# Patient Record
Sex: FEMALE | Race: WHITE | Employment: OTHER | URBAN - METROPOLITAN AREA
[De-identification: names, ages, dates, MRNs, and addresses within clinical notes are randomized per-mention and may not be internally consistent; named-entity substitution may affect disease eponyms.]

---

## 2019-02-22 ENCOUNTER — APPOINTMENT (OUTPATIENT)
Dept: CT IMAGING | Age: 74
End: 2019-02-22
Payer: COMMERCIAL

## 2019-02-22 ENCOUNTER — APPOINTMENT (OUTPATIENT)
Dept: GENERAL RADIOLOGY | Age: 74
End: 2019-02-22
Payer: COMMERCIAL

## 2019-02-22 ENCOUNTER — HOSPITAL ENCOUNTER (EMERGENCY)
Age: 74
Discharge: HOME | End: 2019-02-22
Attending: EMERGENCY MEDICINE
Payer: COMMERCIAL

## 2019-02-22 VITALS
HEIGHT: 60 IN | RESPIRATION RATE: 18 BRPM | TEMPERATURE: 98.5 F | DIASTOLIC BLOOD PRESSURE: 107 MMHG | OXYGEN SATURATION: 95 % | SYSTOLIC BLOOD PRESSURE: 149 MMHG | WEIGHT: 140 LBS | HEART RATE: 73 BPM | BODY MASS INDEX: 27.48 KG/M2

## 2019-02-22 DIAGNOSIS — N30.01 ACUTE CYSTITIS WITH HEMATURIA: Primary | ICD-10-CM

## 2019-02-22 DIAGNOSIS — E86.0 DEHYDRATION: ICD-10-CM

## 2019-02-22 LAB
ALBUMIN SERPL-MCNC: 3.5 G/DL (ref 3.5–5.2)
ALP BLD-CCNC: 30 U/L (ref 35–104)
ALT SERPL-CCNC: 12 U/L (ref 0–32)
ANION GAP SERPL CALCULATED.3IONS-SCNC: 12 MMOL/L (ref 7–16)
AST SERPL-CCNC: 19 U/L (ref 0–31)
BACTERIA: ABNORMAL /HPF
BASOPHILS ABSOLUTE: 0.03 E9/L (ref 0–0.2)
BASOPHILS RELATIVE PERCENT: 0.4 % (ref 0–2)
BILIRUB SERPL-MCNC: <0.2 MG/DL (ref 0–1.2)
BILIRUBIN URINE: NEGATIVE
BLOOD, URINE: ABNORMAL
BUN BLDV-MCNC: 21 MG/DL (ref 8–23)
CALCIUM SERPL-MCNC: 8.7 MG/DL (ref 8.6–10.2)
CHLORIDE BLD-SCNC: 104 MMOL/L (ref 98–107)
CLARITY: ABNORMAL
CO2: 24 MMOL/L (ref 22–29)
COLOR: YELLOW
CREAT SERPL-MCNC: 0.8 MG/DL (ref 0.5–1)
EOSINOPHILS ABSOLUTE: 0.04 E9/L (ref 0.05–0.5)
EOSINOPHILS RELATIVE PERCENT: 0.5 % (ref 0–6)
EPITHELIAL CELLS, UA: ABNORMAL /HPF
GFR AFRICAN AMERICAN: >60
GFR NON-AFRICAN AMERICAN: >60 ML/MIN/1.73
GLUCOSE BLD-MCNC: 117 MG/DL (ref 74–99)
GLUCOSE URINE: NEGATIVE MG/DL
HCT VFR BLD CALC: 39.5 % (ref 34–48)
HEMOGLOBIN: 12.9 G/DL (ref 11.5–15.5)
IMMATURE GRANULOCYTES #: 0.04 E9/L
IMMATURE GRANULOCYTES %: 0.5 % (ref 0–5)
KETONES, URINE: 15 MG/DL
LACTIC ACID: 2.7 MMOL/L (ref 0.5–2.2)
LEUKOCYTE ESTERASE, URINE: ABNORMAL
LYMPHOCYTES ABSOLUTE: 3.1 E9/L (ref 1.5–4)
LYMPHOCYTES RELATIVE PERCENT: 39.5 % (ref 20–42)
MCH RBC QN AUTO: 33.4 PG (ref 26–35)
MCHC RBC AUTO-ENTMCNC: 32.7 % (ref 32–34.5)
MCV RBC AUTO: 102.3 FL (ref 80–99.9)
MONOCYTES ABSOLUTE: 1.07 E9/L (ref 0.1–0.95)
MONOCYTES RELATIVE PERCENT: 13.6 % (ref 2–12)
NEUTROPHILS ABSOLUTE: 3.57 E9/L (ref 1.8–7.3)
NEUTROPHILS RELATIVE PERCENT: 45.5 % (ref 43–80)
NITRITE, URINE: NEGATIVE
PDW BLD-RTO: 14.3 FL (ref 11.5–15)
PH UA: 6 (ref 5–9)
PLATELET # BLD: 131 E9/L (ref 130–450)
PMV BLD AUTO: 11.1 FL (ref 7–12)
POTASSIUM REFLEX MAGNESIUM: 4 MMOL/L (ref 3.5–5)
PROTEIN UA: NEGATIVE MG/DL
RBC # BLD: 3.86 E12/L (ref 3.5–5.5)
RBC UA: ABNORMAL /HPF (ref 0–2)
SODIUM BLD-SCNC: 140 MMOL/L (ref 132–146)
SPECIFIC GRAVITY UA: >=1.03 (ref 1–1.03)
TOTAL PROTEIN: 5.9 G/DL (ref 6.4–8.3)
TROPONIN: <0.01 NG/ML (ref 0–0.03)
UROBILINOGEN, URINE: 0.2 E.U./DL
WBC # BLD: 7.9 E9/L (ref 4.5–11.5)
WBC UA: >20 /HPF (ref 0–5)

## 2019-02-22 PROCEDURE — 2580000003 HC RX 258: Performed by: EMERGENCY MEDICINE

## 2019-02-22 PROCEDURE — 80053 COMPREHEN METABOLIC PANEL: CPT

## 2019-02-22 PROCEDURE — 83605 ASSAY OF LACTIC ACID: CPT

## 2019-02-22 PROCEDURE — 6370000000 HC RX 637 (ALT 250 FOR IP): Performed by: EMERGENCY MEDICINE

## 2019-02-22 PROCEDURE — 71045 X-RAY EXAM CHEST 1 VIEW: CPT

## 2019-02-22 PROCEDURE — 87088 URINE BACTERIA CULTURE: CPT

## 2019-02-22 PROCEDURE — 81001 URINALYSIS AUTO W/SCOPE: CPT

## 2019-02-22 PROCEDURE — 85025 COMPLETE CBC W/AUTO DIFF WBC: CPT

## 2019-02-22 PROCEDURE — 99284 EMERGENCY DEPT VISIT MOD MDM: CPT

## 2019-02-22 PROCEDURE — 96361 HYDRATE IV INFUSION ADD-ON: CPT

## 2019-02-22 PROCEDURE — 84484 ASSAY OF TROPONIN QUANT: CPT

## 2019-02-22 PROCEDURE — 96360 HYDRATION IV INFUSION INIT: CPT

## 2019-02-22 PROCEDURE — 70450 CT HEAD/BRAIN W/O DYE: CPT

## 2019-02-22 RX ORDER — CEFDINIR 300 MG/1
300 CAPSULE ORAL ONCE
Status: COMPLETED | OUTPATIENT
Start: 2019-02-22 | End: 2019-02-22

## 2019-02-22 RX ORDER — CEFDINIR 300 MG/1
300 CAPSULE ORAL 2 TIMES DAILY
Qty: 20 CAPSULE | Refills: 0 | Status: SHIPPED | OUTPATIENT
Start: 2019-02-22 | End: 2019-03-04

## 2019-02-22 RX ORDER — 0.9 % SODIUM CHLORIDE 0.9 %
1000 INTRAVENOUS SOLUTION INTRAVENOUS ONCE
Status: COMPLETED | OUTPATIENT
Start: 2019-02-22 | End: 2019-02-22

## 2019-02-22 RX ADMIN — CEFDINIR 300 MG: 300 CAPSULE ORAL at 22:11

## 2019-02-22 RX ADMIN — SODIUM CHLORIDE 1000 ML: 9 INJECTION, SOLUTION INTRAVENOUS at 19:44

## 2019-02-22 ASSESSMENT — ENCOUNTER SYMPTOMS
EYE PAIN: 0
EYE DISCHARGE: 0
VOMITING: 0
SINUS PRESSURE: 0
DIARRHEA: 0
COUGH: 0
BACK PAIN: 0
EYE REDNESS: 0
WHEEZING: 0
NAUSEA: 0
ABDOMINAL DISTENTION: 0
SORE THROAT: 0
SHORTNESS OF BREATH: 0

## 2019-02-22 ASSESSMENT — PAIN DESCRIPTION - PAIN TYPE: TYPE: CHRONIC PAIN

## 2019-02-22 ASSESSMENT — PAIN DESCRIPTION - LOCATION: LOCATION: BACK

## 2019-02-22 ASSESSMENT — PAIN DESCRIPTION - ORIENTATION: ORIENTATION: LOWER

## 2019-02-22 ASSESSMENT — PAIN SCALES - GENERAL: PAINLEVEL_OUTOF10: 10

## 2019-02-25 LAB — URINE CULTURE, ROUTINE: NORMAL

## 2019-03-02 LAB
EKG ATRIAL RATE: 68 BPM
EKG P AXIS: 60 DEGREES
EKG P-R INTERVAL: 128 MS
EKG Q-T INTERVAL: 394 MS
EKG QRS DURATION: 74 MS
EKG QTC CALCULATION (BAZETT): 418 MS
EKG R AXIS: 48 DEGREES
EKG T AXIS: 29 DEGREES
EKG VENTRICULAR RATE: 68 BPM

## 2020-07-24 ENCOUNTER — HOSPITAL ENCOUNTER (OUTPATIENT)
Dept: MAMMOGRAPHY | Age: 75
Discharge: HOME | End: 2020-07-26
Payer: COMMERCIAL

## 2020-07-24 PROCEDURE — 77063 BREAST TOMOSYNTHESIS BI: CPT

## 2020-07-27 ENCOUNTER — TELEPHONE (OUTPATIENT)
Dept: ONCOLOGY | Age: 75
End: 2020-07-27

## 2020-07-30 ENCOUNTER — HOSPITAL ENCOUNTER (OUTPATIENT)
Dept: GENERAL RADIOLOGY | Age: 75
Discharge: HOME | End: 2020-08-01
Payer: COMMERCIAL

## 2020-07-30 PROCEDURE — G0279 TOMOSYNTHESIS, MAMMO: HCPCS

## 2020-07-30 PROCEDURE — 76642 ULTRASOUND BREAST LIMITED: CPT

## 2020-10-08 ENCOUNTER — APPOINTMENT (OUTPATIENT)
Dept: GENERAL RADIOLOGY | Age: 75
End: 2020-10-08
Payer: COMMERCIAL

## 2020-10-08 ENCOUNTER — HOSPITAL ENCOUNTER (OUTPATIENT)
Age: 75
Setting detail: OBSERVATION
Discharge: HOME | End: 2020-10-09
Attending: EMERGENCY MEDICINE | Admitting: INTERNAL MEDICINE
Payer: COMMERCIAL

## 2020-10-08 PROBLEM — R55 NEAR SYNCOPE: Status: ACTIVE | Noted: 2020-10-08

## 2020-10-08 LAB
ALBUMIN SERPL-MCNC: 3.7 G/DL (ref 3.5–5.2)
ALP BLD-CCNC: 20 U/L (ref 35–104)
ALT SERPL-CCNC: <5 U/L (ref 0–32)
ANION GAP SERPL CALCULATED.3IONS-SCNC: 8 MMOL/L (ref 7–16)
AST SERPL-CCNC: 17 U/L (ref 0–31)
BACTERIA: ABNORMAL /HPF
BASOPHILS ABSOLUTE: 0.03 E9/L (ref 0–0.2)
BASOPHILS RELATIVE PERCENT: 0.4 % (ref 0–2)
BILIRUB SERPL-MCNC: 0.4 MG/DL (ref 0–1.2)
BILIRUBIN URINE: NEGATIVE
BLOOD, URINE: NEGATIVE
BUN BLDV-MCNC: 23 MG/DL (ref 8–23)
CALCIUM SERPL-MCNC: 9.3 MG/DL (ref 8.6–10.2)
CHLORIDE BLD-SCNC: 110 MMOL/L (ref 98–107)
CLARITY: CLEAR
CO2: 24 MMOL/L (ref 22–29)
COLOR: YELLOW
CREAT SERPL-MCNC: 0.7 MG/DL (ref 0.5–1)
EKG ATRIAL RATE: 69 BPM
EKG P AXIS: 35 DEGREES
EKG P-R INTERVAL: 122 MS
EKG Q-T INTERVAL: 398 MS
EKG QRS DURATION: 66 MS
EKG QTC CALCULATION (BAZETT): 426 MS
EKG R AXIS: 54 DEGREES
EKG T AXIS: 15 DEGREES
EKG VENTRICULAR RATE: 69 BPM
EOSINOPHILS ABSOLUTE: 0.12 E9/L (ref 0.05–0.5)
EOSINOPHILS RELATIVE PERCENT: 1.6 % (ref 0–6)
EPITHELIAL CELLS, UA: ABNORMAL /HPF
GFR AFRICAN AMERICAN: >60
GFR NON-AFRICAN AMERICAN: >60 ML/MIN/1.73
GLUCOSE BLD-MCNC: 82 MG/DL (ref 74–99)
GLUCOSE URINE: NEGATIVE MG/DL
HCT VFR BLD CALC: 37.7 % (ref 34–48)
HEMOGLOBIN: 12.3 G/DL (ref 11.5–15.5)
IMMATURE GRANULOCYTES #: 0.02 E9/L
IMMATURE GRANULOCYTES %: 0.3 % (ref 0–5)
KETONES, URINE: ABNORMAL MG/DL
LACTIC ACID: 1.8 MMOL/L (ref 0.5–2.2)
LEUKOCYTE ESTERASE, URINE: ABNORMAL
LYMPHOCYTES ABSOLUTE: 3.76 E9/L (ref 1.5–4)
LYMPHOCYTES RELATIVE PERCENT: 50.3 % (ref 20–42)
MCH RBC QN AUTO: 34.4 PG (ref 26–35)
MCHC RBC AUTO-ENTMCNC: 32.6 % (ref 32–34.5)
MCV RBC AUTO: 105.3 FL (ref 80–99.9)
MONOCYTES ABSOLUTE: 0.65 E9/L (ref 0.1–0.95)
MONOCYTES RELATIVE PERCENT: 8.7 % (ref 2–12)
NEUTROPHILS ABSOLUTE: 2.9 E9/L (ref 1.8–7.3)
NEUTROPHILS RELATIVE PERCENT: 38.7 % (ref 43–80)
NITRITE, URINE: NEGATIVE
PDW BLD-RTO: 13.5 FL (ref 11.5–15)
PH UA: 7 (ref 5–9)
PLATELET # BLD: 136 E9/L (ref 130–450)
PMV BLD AUTO: 12.1 FL (ref 7–12)
POTASSIUM REFLEX MAGNESIUM: 4.1 MMOL/L (ref 3.5–5)
PROTEIN UA: NEGATIVE MG/DL
RBC # BLD: 3.58 E12/L (ref 3.5–5.5)
RBC UA: ABNORMAL /HPF (ref 0–2)
SARS-COV-2, NAAT: NOT DETECTED
SODIUM BLD-SCNC: 142 MMOL/L (ref 132–146)
SPECIFIC GRAVITY UA: 1.01 (ref 1–1.03)
TOTAL PROTEIN: 5.7 G/DL (ref 6.4–8.3)
TROPONIN: <0.01 NG/ML (ref 0–0.03)
TROPONIN: <0.01 NG/ML (ref 0–0.03)
UROBILINOGEN, URINE: 0.2 E.U./DL
WBC # BLD: 7.5 E9/L (ref 4.5–11.5)
WBC UA: ABNORMAL /HPF (ref 0–5)

## 2020-10-08 PROCEDURE — U0002 COVID-19 LAB TEST NON-CDC: HCPCS

## 2020-10-08 PROCEDURE — 71045 X-RAY EXAM CHEST 1 VIEW: CPT

## 2020-10-08 PROCEDURE — G0378 HOSPITAL OBSERVATION PER HR: HCPCS

## 2020-10-08 PROCEDURE — 80053 COMPREHEN METABOLIC PANEL: CPT

## 2020-10-08 PROCEDURE — 96360 HYDRATION IV INFUSION INIT: CPT

## 2020-10-08 PROCEDURE — 85025 COMPLETE CBC W/AUTO DIFF WBC: CPT

## 2020-10-08 PROCEDURE — 87088 URINE BACTERIA CULTURE: CPT

## 2020-10-08 PROCEDURE — 84484 ASSAY OF TROPONIN QUANT: CPT

## 2020-10-08 PROCEDURE — 6360000002 HC RX W HCPCS: Performed by: PHYSICIAN ASSISTANT

## 2020-10-08 PROCEDURE — 6370000000 HC RX 637 (ALT 250 FOR IP): Performed by: PHYSICIAN ASSISTANT

## 2020-10-08 PROCEDURE — 96361 HYDRATE IV INFUSION ADD-ON: CPT

## 2020-10-08 PROCEDURE — 87040 BLOOD CULTURE FOR BACTERIA: CPT

## 2020-10-08 PROCEDURE — 96372 THER/PROPH/DIAG INJ SC/IM: CPT

## 2020-10-08 PROCEDURE — 36415 COLL VENOUS BLD VENIPUNCTURE: CPT

## 2020-10-08 PROCEDURE — 2580000003 HC RX 258: Performed by: EMERGENCY MEDICINE

## 2020-10-08 PROCEDURE — 87186 SC STD MICRODIL/AGAR DIL: CPT

## 2020-10-08 PROCEDURE — 2580000003 HC RX 258: Performed by: PHYSICIAN ASSISTANT

## 2020-10-08 PROCEDURE — 99285 EMERGENCY DEPT VISIT HI MDM: CPT

## 2020-10-08 PROCEDURE — 99284 EMERGENCY DEPT VISIT MOD MDM: CPT

## 2020-10-08 PROCEDURE — 83605 ASSAY OF LACTIC ACID: CPT

## 2020-10-08 PROCEDURE — 87077 CULTURE AEROBIC IDENTIFY: CPT

## 2020-10-08 PROCEDURE — 93005 ELECTROCARDIOGRAM TRACING: CPT | Performed by: EMERGENCY MEDICINE

## 2020-10-08 PROCEDURE — 81001 URINALYSIS AUTO W/SCOPE: CPT

## 2020-10-08 RX ORDER — 0.9 % SODIUM CHLORIDE 0.9 %
1000 INTRAVENOUS SOLUTION INTRAVENOUS ONCE
Status: COMPLETED | OUTPATIENT
Start: 2020-10-08 | End: 2020-10-08

## 2020-10-08 RX ORDER — ATORVASTATIN CALCIUM 20 MG/1
20 TABLET, FILM COATED ORAL NIGHTLY
Status: DISCONTINUED | OUTPATIENT
Start: 2020-10-08 | End: 2020-10-09 | Stop reason: HOSPADM

## 2020-10-08 RX ORDER — NAPROXEN 250 MG/1
250 TABLET ORAL 2 TIMES DAILY WITH MEALS
Status: DISCONTINUED | OUTPATIENT
Start: 2020-10-08 | End: 2020-10-09 | Stop reason: HOSPADM

## 2020-10-08 RX ORDER — POTASSIUM CHLORIDE 7.45 MG/ML
10 INJECTION INTRAVENOUS PRN
Status: DISCONTINUED | OUTPATIENT
Start: 2020-10-08 | End: 2020-10-09 | Stop reason: HOSPADM

## 2020-10-08 RX ORDER — CETIRIZINE HYDROCHLORIDE 10 MG/1
10 TABLET ORAL EVERY MORNING
Status: DISCONTINUED | OUTPATIENT
Start: 2020-10-09 | End: 2020-10-09 | Stop reason: HOSPADM

## 2020-10-08 RX ORDER — PROMETHAZINE HYDROCHLORIDE 25 MG/1
12.5 TABLET ORAL EVERY 6 HOURS PRN
Status: DISCONTINUED | OUTPATIENT
Start: 2020-10-08 | End: 2020-10-09 | Stop reason: HOSPADM

## 2020-10-08 RX ORDER — ACETAMINOPHEN 650 MG/1
650 SUPPOSITORY RECTAL EVERY 6 HOURS PRN
Status: DISCONTINUED | OUTPATIENT
Start: 2020-10-08 | End: 2020-10-09 | Stop reason: HOSPADM

## 2020-10-08 RX ORDER — SODIUM CHLORIDE 0.9 % (FLUSH) 0.9 %
10 SYRINGE (ML) INJECTION PRN
Status: DISCONTINUED | OUTPATIENT
Start: 2020-10-08 | End: 2020-10-09 | Stop reason: HOSPADM

## 2020-10-08 RX ORDER — ONDANSETRON 2 MG/ML
4 INJECTION INTRAMUSCULAR; INTRAVENOUS EVERY 6 HOURS PRN
Status: DISCONTINUED | OUTPATIENT
Start: 2020-10-08 | End: 2020-10-09 | Stop reason: HOSPADM

## 2020-10-08 RX ORDER — DIVALPROEX SODIUM 500 MG/1
1000 TABLET, EXTENDED RELEASE ORAL NIGHTLY
Status: DISCONTINUED | OUTPATIENT
Start: 2020-10-08 | End: 2020-10-09 | Stop reason: HOSPADM

## 2020-10-08 RX ORDER — SIMVASTATIN 40 MG
40 TABLET ORAL NIGHTLY
Status: DISCONTINUED | OUTPATIENT
Start: 2020-10-08 | End: 2020-10-08 | Stop reason: CLARIF

## 2020-10-08 RX ORDER — SODIUM CHLORIDE 0.9 % (FLUSH) 0.9 %
10 SYRINGE (ML) INJECTION EVERY 12 HOURS SCHEDULED
Status: DISCONTINUED | OUTPATIENT
Start: 2020-10-08 | End: 2020-10-09 | Stop reason: HOSPADM

## 2020-10-08 RX ORDER — RISPERIDONE 2 MG/1
2 TABLET, FILM COATED ORAL NIGHTLY
Status: DISCONTINUED | OUTPATIENT
Start: 2020-10-08 | End: 2020-10-09 | Stop reason: HOSPADM

## 2020-10-08 RX ORDER — NAPROXEN SODIUM 220 MG
220 TABLET ORAL 2 TIMES DAILY WITH MEALS
Status: DISCONTINUED | OUTPATIENT
Start: 2020-10-08 | End: 2020-10-08 | Stop reason: CLARIF

## 2020-10-08 RX ORDER — SODIUM CHLORIDE 9 MG/ML
INJECTION, SOLUTION INTRAVENOUS CONTINUOUS
Status: DISCONTINUED | OUTPATIENT
Start: 2020-10-08 | End: 2020-10-09 | Stop reason: HOSPADM

## 2020-10-08 RX ORDER — CITALOPRAM 20 MG/1
40 TABLET ORAL NIGHTLY
Status: DISCONTINUED | OUTPATIENT
Start: 2020-10-08 | End: 2020-10-09 | Stop reason: HOSPADM

## 2020-10-08 RX ORDER — MEGESTROL ACETATE 40 MG/ML
400 SUSPENSION ORAL DAILY
Status: DISCONTINUED | OUTPATIENT
Start: 2020-10-08 | End: 2020-10-09 | Stop reason: HOSPADM

## 2020-10-08 RX ORDER — CLONAZEPAM 0.5 MG/1
0.25 TABLET ORAL 2 TIMES DAILY
Status: DISCONTINUED | OUTPATIENT
Start: 2020-10-08 | End: 2020-10-09 | Stop reason: HOSPADM

## 2020-10-08 RX ORDER — ACETAMINOPHEN 325 MG/1
650 TABLET ORAL EVERY 6 HOURS PRN
Status: DISCONTINUED | OUTPATIENT
Start: 2020-10-08 | End: 2020-10-09 | Stop reason: HOSPADM

## 2020-10-08 RX ORDER — MEGESTROL ACETATE 40 MG/ML
400 SUSPENSION ORAL DAILY
COMMUNITY

## 2020-10-08 RX ORDER — POTASSIUM CHLORIDE 20 MEQ/1
40 TABLET, EXTENDED RELEASE ORAL PRN
Status: DISCONTINUED | OUTPATIENT
Start: 2020-10-08 | End: 2020-10-09 | Stop reason: HOSPADM

## 2020-10-08 RX ADMIN — CARBIDOPA AND LEVODOPA 1 TABLET: 25; 100 TABLET ORAL at 21:17

## 2020-10-08 RX ADMIN — SODIUM CHLORIDE: 9 INJECTION, SOLUTION INTRAVENOUS at 21:18

## 2020-10-08 RX ADMIN — DIVALPROEX SODIUM 1000 MG: 500 TABLET, EXTENDED RELEASE ORAL at 21:18

## 2020-10-08 RX ADMIN — SODIUM CHLORIDE, PRESERVATIVE FREE 10 ML: 5 INJECTION INTRAVENOUS at 21:35

## 2020-10-08 RX ADMIN — CITALOPRAM HYDROBROMIDE 40 MG: 20 TABLET ORAL at 21:17

## 2020-10-08 RX ADMIN — CLONAZEPAM 0.25 MG: 0.5 TABLET ORAL at 21:17

## 2020-10-08 RX ADMIN — NAPROXEN 250 MG: 250 TABLET ORAL at 21:18

## 2020-10-08 RX ADMIN — ENOXAPARIN SODIUM 40 MG: 40 INJECTION SUBCUTANEOUS at 21:18

## 2020-10-08 RX ADMIN — SODIUM CHLORIDE 1000 ML: 9 INJECTION, SOLUTION INTRAVENOUS at 09:02

## 2020-10-08 RX ADMIN — ATORVASTATIN CALCIUM 20 MG: 20 TABLET, FILM COATED ORAL at 21:17

## 2020-10-08 RX ADMIN — RISPERIDONE 2 MG: 2 TABLET ORAL at 21:18

## 2020-10-08 RX ADMIN — MEGESTROL ACETATE 400 MG: 40 SUSPENSION ORAL at 21:18

## 2020-10-08 ASSESSMENT — PAIN SCALES - GENERAL: PAINLEVEL_OUTOF10: 0

## 2020-10-08 NOTE — ED PROVIDER NOTES
HPI:  10/8/20, Time: 8:11 AM EDT         Miriam Rajan is a 70 y.o. female presenting to the ED for fall. Patient presents from [facility]. According to EMS, the patient had a fall, and she was found to be hypotensive prior to arrival.  Patient states that she remembers the entire episode. She states that she was feeling dizzy. She did not strike her head or lose consciousness. She currently has no complaints. She takes no anticoagulation. She denies strokelike symptoms. No focal numbness or weakness, dysarthria, or facial droop. She denies headache, neck pain, back pain, chest pain, abdominal pain, cough, nausea, vomiting, dysuria, and diarrhea. She states that she took her usual morning medications. She states that she did not get eat today. No known exposures to COVID-19. Review of Systems:   Pertinent positives and negatives are stated within HPI, all other systems reviewed and are negative.          --------------------------------------------- PAST HISTORY ---------------------------------------------  Past Medical History:  has a past medical history of Anxiety, Arthritis, Asthma, Dementia ([facility] [address] Depression, Depression, Hyperlipidemia, Mild mental retardation, and Osteoporosis. Past Surgical History:  has a past surgical history that includes Tonsillectomy and hernia repair. Social History:  reports that she has never smoked. She has never used smokeless tobacco. She reports that she does not drink alcohol or use drugs. Family History: Family history is unknown by patient. The patients home medications have been reviewed.     Allergies: Pcn [penicillins]    -------------------------------------------------- RESULTS -------------------------------------------------  All laboratory and radiology results have been personally reviewed by myself   LABS:  Results for orders placed or performed during the hospital encounter of 10/08/20   CBC Auto Differential   Result Value Ref Range    WBC 7.5 4.5 - 11.5 E9/L    RBC 3.58 3.50 - 5.50 E12/L    Hemoglobin 12.3 11.5 - 15.5 g/dL    Hematocrit 37.7 34.0 - 48.0 %    .3 (H) 80.0 - 99.9 fL    MCH 34.4 26.0 - 35.0 pg    MCHC 32.6 32.0 - 34.5 %    RDW 13.5 11.5 - 15.0 fL    Platelets 390 846 - 836 E9/L    MPV 12.1 (H) 7.0 - 12.0 fL    Neutrophils % 38.7 (L) 43.0 - 80.0 %    Immature Granulocytes % 0.3 0.0 - 5.0 %    Lymphocytes % 50.3 (H) 20.0 - 42.0 %    Monocytes % 8.7 2.0 - 12.0 %    Eosinophils % 1.6 0.0 - 6.0 %    Basophils % 0.4 0.0 - 2.0 %    Neutrophils Absolute 2.90 1.80 - 7.30 E9/L    Immature Granulocytes # 0.02 E9/L    Lymphocytes Absolute 3.76 1.50 - 4.00 E9/L    Monocytes Absolute 0.65 0.10 - 0.95 E9/L    Eosinophils Absolute 0.12 0.05 - 0.50 E9/L    Basophils Absolute 0.03 0.00 - 0.20 E9/L   Comprehensive Metabolic Panel w/ Reflex to MG   Result Value Ref Range    Sodium 142 132 - 146 mmol/L    Potassium reflex Magnesium 4.1 3.5 - 5.0 mmol/L    Chloride 110 (H) 98 - 107 mmol/L    CO2 24 22 - 29 mmol/L    Anion Gap 8 7 - 16 mmol/L    Glucose 82 74 - 99 mg/dL    BUN 23 8 - 23 mg/dL    CREATININE 0.7 0.5 - 1.0 mg/dL    GFR Non-African American >60 >=60 mL/min/1.73    GFR African American >60     Calcium 9.3 8.6 - 10.2 mg/dL    Total Protein 5.7 (L) 6.4 - 8.3 g/dL    Alb 3.7 3.5 - 5.2 g/dL    Total Bilirubin 0.4 0.0 - 1.2 mg/dL    Alkaline Phosphatase 20 (L) 35 - 104 U/L    ALT <5 0 - 32 U/L    AST 17 0 - 31 U/L   Urinalysis, reflex to microscopic   Result Value Ref Range    Color, UA Yellow Straw/Yellow    Clarity, UA Clear Clear    Glucose, Ur Negative Negative mg/dL    Bilirubin Urine Negative Negative    Ketones, Urine TRACE (A) Negative mg/dL    Specific Gravity, UA 1.015 1.005 - 1.030    Blood, Urine Negative Negative    pH, UA 7.0 5.0 - 9.0    Protein, UA Negative Negative mg/dL    Urobilinogen, Urine 0.2 <2.0 E.U./dL    Nitrite, Urine Negative Negative    Leukocyte Esterase, Urine TRACE (A) Negative   Lactic Acid, Plasma   Result Value Ref Range    Lactic Acid 1.8 0.5 - 2.2 mmol/L   Troponin   Result Value Ref Range    Troponin <0.01 0.00 - 0.03 ng/mL   Microscopic Urinalysis   Result Value Ref Range    WBC, UA 2-5 0 - 5 /HPF    RBC, UA 0-1 0 - 2 /HPF    Epithelial Cells, UA FEW /HPF    Bacteria, UA RARE (A) None Seen /HPF   EKG 12 Lead   Result Value Ref Range    Ventricular Rate 69 BPM    Atrial Rate 69 BPM    P-R Interval 122 ms    QRS Duration 66 ms    Q-T Interval 398 ms    QTc Calculation (Bazett) 426 ms    P Axis 35 degrees    R Axis 54 degrees    T Axis 15 degrees       RADIOLOGY:  Interpreted by Radiologist.  XR CHEST PORTABLE   Final Result   No acute process. ------------------------- NURSING NOTES AND VITALS REVIEWED ---------------------------   The nursing notes within the ED encounter and vital signs as below have been reviewed. BP (!) 107/54   Pulse 78   Temp 97.8 °F (36.6 °C) (Oral)   Resp 14   Ht 5' 6\" (1.676 m)   Wt 107 lb (48.5 kg)   SpO2 98%   BMI 17.27 kg/m²   Oxygen Saturation Interpretation: Normal      ---------------------------------------------------PHYSICAL EXAM--------------------------------------      Constitutional/General: Alert and oriented x3, appears pale non toxic in NAD  Head: Normocephalic and atraumatic  Eyes: PERRL, EOMI  Mouth: Oropharynx clear, handling secretions, no trismus  Neck: Supple, full ROM, no neck tenderness. Pulmonary: Lungs clear to auscultation bilaterally, no wheezes, rales, or rhonchi. Not in respiratory distress  Cardiovascular:  Regular rate and rhythm, no murmurs, gallops, or rubs. 2+ distal pulses-palpable bilateral radial, DP, and PT pulses  Abdomen: Soft, non tender, non distended,   Extremities: Moves all extremities x 4. Warm and well perfused. No LE edema.  No calf tenderness  Skin: warm and dry without rash  Neurologic: GCS 15, no focal motor or sensory deficits   Psych: Normal Affect      ------------------------------ ED COURSE/MEDICAL DECISION MAKING----------------------  Medications   0.9 % sodium chloride bolus (0 mLs Intravenous Stopped 10/8/20 1129)       Medical Decision Making/ED COURSE:   Patient is a 79-year-old female presenting from assisted living with dizziness and fall. In the ED, patient was hypotensive. On exam, nontoxic. He had no external signs of trauma. No focal neurologic deficits. Patient was placed on the cardiac monitor. I interpreted findings. Rhythm - sinus. Labs and CXR obtained. Patient administered IVF. I reviewed and interpreted labs. Labs unremarkable. Urinalysis shows few bacteria but no obvious source of infection. Urine culture and blood culture sent. Will defer antibiotics at this time. Patient ambulated, and she required assistance. My nurse spoke with the nursing home, and apparently she is normally independent and can ambulate with a cane without assistance. I suspect dehydration. Plan to admit patient for observation and hydration as well as PT/OT. Patient remained hemodynamically stable throughout ED course. ED Course as of Oct 08 1634   Thu Oct 08, 2020   0600 I reviewed the chart. ECHO 11/15/16:     Normal left ventricle size and systolic function   Ejection fraction is visually estimated at 65%. Borderline asymmetric septal hypertrophy. Stage I diastolic dysfunction   No wall motion abnormalities   Aneurysmal inter-atrial septum. Significantly positive bubble study   Physiologic and/or trace mitral regurgitation is present. Mild tricuspid regurgitation. [JA]   0900 EKG: This EKG is signed and interpreted by me. Rate: 69  Rhythm: Sinus  Interpretation: Normal sinus rhythm, normal DE interval, normal QRS, normal QT interval, no acute ST or T wave changes, artifact  Comparison: no previous EKG available        [JA]   3060 Patient ambulated. Unsteady on her feet. My nurse spoke with the assisted living facility.  States patient is normally independent, but she has had multiple falls over the last week. Plan to admit for fluid hydration and observation. [JA]   1891 I spoke with Jean Torres working with Dr. Carlos Lobato. Accepted the patient for admission. [JA]      ED Course User Index  [JA] Eliane Rhodes MD         Counseling: The emergency provider has spoken with the patient and discussed todays results, in addition to providing specific details for the plan of care and counseling regarding the diagnosis and prognosis. Questions are answered at this time and they are agreeable with the plan.      --------------------------------- IMPRESSION AND DISPOSITION ---------------------------------    IMPRESSION  1. Dehydration    2. Frequent falls    3. Pre-syncope        DISPOSITION  Disposition: Admit to telemetry  Patient condition is stable      NOTE: This report was transcribed using voice recognition software.  Every effort was made to ensure accuracy; however, inadvertent computerized transcription errors may be present    I, Eliane Rhodes MD, am the primary provider of this record       Eliane Rhodes MD  10/08/20 9946

## 2020-10-08 NOTE — ED NOTES
Spoke with [facility] at [facility]. [phone] pt normally uses cane to walk, longer distance a walker.      Hollis Collado RN  10/08/20 9408

## 2020-10-09 VITALS
HEIGHT: 66 IN | RESPIRATION RATE: 16 BRPM | BODY MASS INDEX: 17.12 KG/M2 | DIASTOLIC BLOOD PRESSURE: 60 MMHG | TEMPERATURE: 98.4 F | HEART RATE: 86 BPM | SYSTOLIC BLOOD PRESSURE: 116 MMHG | WEIGHT: 106.5 LBS | OXYGEN SATURATION: 99 %

## 2020-10-09 PROBLEM — R55 NEAR SYNCOPE: Status: ACTIVE | Noted: 2020-10-09

## 2020-10-09 PROBLEM — R55 NEAR SYNCOPE: Status: RESOLVED | Noted: 2020-10-08 | Resolved: 2020-10-09

## 2020-10-09 LAB
ANION GAP SERPL CALCULATED.3IONS-SCNC: 7 MMOL/L (ref 7–16)
BASOPHILS ABSOLUTE: 0.02 E9/L (ref 0–0.2)
BASOPHILS RELATIVE PERCENT: 0.4 % (ref 0–2)
BUN BLDV-MCNC: 18 MG/DL (ref 8–23)
CALCIUM SERPL-MCNC: 8.3 MG/DL (ref 8.6–10.2)
CHLORIDE BLD-SCNC: 115 MMOL/L (ref 98–107)
CO2: 21 MMOL/L (ref 22–29)
CREAT SERPL-MCNC: 0.6 MG/DL (ref 0.5–1)
EOSINOPHILS ABSOLUTE: 0.04 E9/L (ref 0.05–0.5)
EOSINOPHILS RELATIVE PERCENT: 0.7 % (ref 0–6)
GFR AFRICAN AMERICAN: >60
GFR NON-AFRICAN AMERICAN: >60 ML/MIN/1.73
GLUCOSE BLD-MCNC: 92 MG/DL (ref 74–99)
HCT VFR BLD CALC: 32.5 % (ref 34–48)
HEMOGLOBIN: 10.8 G/DL (ref 11.5–15.5)
IMMATURE GRANULOCYTES #: 0.01 E9/L
IMMATURE GRANULOCYTES %: 0.2 % (ref 0–5)
LYMPHOCYTES ABSOLUTE: 2.42 E9/L (ref 1.5–4)
LYMPHOCYTES RELATIVE PERCENT: 43 % (ref 20–42)
MCH RBC QN AUTO: 34.7 PG (ref 26–35)
MCHC RBC AUTO-ENTMCNC: 33.2 % (ref 32–34.5)
MCV RBC AUTO: 104.5 FL (ref 80–99.9)
MONOCYTES ABSOLUTE: 0.52 E9/L (ref 0.1–0.95)
MONOCYTES RELATIVE PERCENT: 9.2 % (ref 2–12)
NEUTROPHILS ABSOLUTE: 2.62 E9/L (ref 1.8–7.3)
NEUTROPHILS RELATIVE PERCENT: 46.5 % (ref 43–80)
PDW BLD-RTO: 13.4 FL (ref 11.5–15)
PLATELET # BLD: 115 E9/L (ref 130–450)
PMV BLD AUTO: 12 FL (ref 7–12)
POTASSIUM REFLEX MAGNESIUM: 3.9 MMOL/L (ref 3.5–5)
RBC # BLD: 3.11 E12/L (ref 3.5–5.5)
SODIUM BLD-SCNC: 143 MMOL/L (ref 132–146)
TROPONIN: <0.01 NG/ML (ref 0–0.03)
WBC # BLD: 5.6 E9/L (ref 4.5–11.5)

## 2020-10-09 PROCEDURE — 80048 BASIC METABOLIC PNL TOTAL CA: CPT

## 2020-10-09 PROCEDURE — 2580000003 HC RX 258: Performed by: PHYSICIAN ASSISTANT

## 2020-10-09 PROCEDURE — 36415 COLL VENOUS BLD VENIPUNCTURE: CPT

## 2020-10-09 PROCEDURE — 97165 OT EVAL LOW COMPLEX 30 MIN: CPT

## 2020-10-09 PROCEDURE — G0378 HOSPITAL OBSERVATION PER HR: HCPCS

## 2020-10-09 PROCEDURE — 97530 THERAPEUTIC ACTIVITIES: CPT

## 2020-10-09 PROCEDURE — 97161 PT EVAL LOW COMPLEX 20 MIN: CPT

## 2020-10-09 PROCEDURE — 84484 ASSAY OF TROPONIN QUANT: CPT

## 2020-10-09 PROCEDURE — 85025 COMPLETE CBC W/AUTO DIFF WBC: CPT

## 2020-10-09 PROCEDURE — 6370000000 HC RX 637 (ALT 250 FOR IP): Performed by: PHYSICIAN ASSISTANT

## 2020-10-09 RX ADMIN — SODIUM CHLORIDE, PRESERVATIVE FREE 10 ML: 5 INJECTION INTRAVENOUS at 09:41

## 2020-10-09 RX ADMIN — CETIRIZINE HYDROCHLORIDE 10 MG: 10 TABLET, FILM COATED ORAL at 09:41

## 2020-10-09 RX ADMIN — NAPROXEN 250 MG: 250 TABLET ORAL at 09:40

## 2020-10-09 RX ADMIN — CLONAZEPAM 0.25 MG: 0.5 TABLET ORAL at 10:14

## 2020-10-09 RX ADMIN — MEGESTROL ACETATE 400 MG: 40 SUSPENSION ORAL at 09:41

## 2020-10-09 RX ADMIN — CARBIDOPA AND LEVODOPA 1 TABLET: 25; 100 TABLET ORAL at 09:40

## 2020-10-09 ASSESSMENT — PAIN SCALES - GENERAL: PAINLEVEL_OUTOF10: 2

## 2020-10-09 NOTE — CARE COORDINATION
Met with patient about diagnosis and discharge plan of care. Pt cooperative. [facility]. Has cane and [employer]. Pt has legal guardian, sister, Silvino Pelletier is going to reach out. Pending PT/OT. PCP Dr Gissel Williamson.  Will follow for needs-O

## 2020-10-09 NOTE — PROGRESS NOTES
Patient's legal guardian notified about discharge plan to [facility] and [facility]

## 2020-10-09 NOTE — DISCHARGE INSTR - COC
Continuity of Care Form    Patient Name: Dian Bay   :  1945  MRN:  [id number]    Admit date:  10/8/2020  Discharge date:  10/09/2020    Code Status Order: Full Code   Advance Directives:   [facility] Documentation       Date/Time Healthcare Directive Type of Healthcare Directive Copy in [address] Agent's Name Healthcare Agent's Phone Number    10/09/20 0040  Yes, patient has an advance directive for healthcare treatment  Durable power of  for health care legal guardian  No, copy requested from family  Legal Guardian  Jerrod Petersen  see chart             Admitting Physician:  Vee Roach MD  PCP: Fe Chan MD    Discharging Nurse: Mary Thomas RN  [address] Unit/Room#: 9143/6676-P  Discharging Unit Phone Number: [phone]    Emergency Contact:   Extended Emergency Contact Information  Primary Emergency Contact: Sean York [facility] [address] [facility] Phone: [phone]  Mobile Phone: [phone]  Relation: Brother/Sister  Secondary Emergency Contact: Alejandra Tucker [address] Phone: [phone]  Mobile Phone: [phone]  Relation: Other    Past Surgical History:  Past Surgical History:   Procedure Laterality Date    HERNIA REPAIR      TONSILLECTOMY         Immunization History: There is no immunization history on file for this patient.     Active Problems:  Patient Active Problem List   Diagnosis Code    Hypotension I95.9    Dementia ([facility] [address] F03.90    Mixed hyperlipidemia E78.2    Mental retardation F79    Near syncope R55       Isolation/Infection:   Isolation            No Isolation          Patient Infection Status       Infection Onset Added Last Indicated Last Indicated By Review Planned Expiration Resolved Resolved By    None active    Resolved    COVID-19 Rule Out 10/08/20 10/08/20 10/08/20 COVID-19 (Ordered)   10/08/20 Rule-Out Test Resulted            Nurse Assessment:  Last Vital Signs: /60   Pulse 86   Temp 98.4 °F (36.9 °C) (Oral)   Resp 16   Ht 5' 6\" (1.676 m)   Wt 106 lb 8 oz (48.3 kg)   SpO2 99%   BMI 17.19 kg/m²     Last documented pain score (0-10 scale): Pain Level: 2  Last Weight:   Wt Readings from Last 1 Encounters:   10/09/20 106 lb 8 oz (48.3 kg)     Mental Status:  oriented    IV Access:  - None    Nursing Mobility/ADLs:  Walking   Assisted  Transfer  Assisted  Bathing  Assisted  Dressing  Assisted  Toileting  Assisted  Feeding  Independent  Med Admin  Independent  Med Delivery   whole    Wound Care Documentation and Therapy:        Elimination:  Continence:   · Bowel: Yes  · Bladder: Yes  Urinary Catheter: None   Colostomy/Ileostomy/Ileal Conduit: No       Date of Last BM: 10/09/2020      Intake/Output Summary (Last 24 hours) at 10/9/2020 1120  Last data filed at 10/8/2020 2209  Gross per 24 hour   Intake 480 ml   Output --   Net 480 ml     I/O last 3 completed shifts: In: 480 [P.O.:480]  Out: -     Safety Concerns: At Risk for Falls    Impairments/Disabilities:      hx of mild mental retardation    Nutrition Therapy:  Current Nutrition Therapy:   - Oral Diet:  General    Routes of Feeding: Oral  Liquids: No Restrictions  Daily Fluid Restriction: no  Last Modified Barium Swallow with Video (Video Swallowing Test): not done    Treatments at the Time of Hospital Discharge:   Respiratory Treatments: ***  Oxygen Therapy:  is not on home oxygen therapy.   Ventilator:    - No ventilator support    Rehab Therapies: {THERAPEUTIC INTERVENTION:9562186354}  Weight Bearing Status/Restrictions: No weight bearing restirctions  Other Medical Equipment (for information only, NOT a DME order):  cane  Other Treatments: ***    Patient's personal belongings (please select all that are sent with patient):  None    RN SIGNATURE:  {Esignature:235269149:::0}    CASE MANAGEMENT/SOCIAL WORK SECTION    Inpatient Status Date: ***    Readmission Risk Assessment Score:  Readmission Risk              Risk of Unplanned Readmission:        0           Discharging to Facility/ Agency   · Name:   · Address:  · Phone:  · Fax:    Dialysis Facility (if applicable)   · Name:  · Address:  · Dialysis Schedule:  · Phone:  · Fax:    / signature: {Esignature:[id number]:::0}    PHYSICIAN SECTION    Prognosis: {Prognosis:1784410580:::0}    Condition at Discharge: [address] [place] [address] Patient Condition:096332047:::0}    Rehab Potential (if transferring to Rehab): {Prognosis:2089711406:::0}    Recommended Labs or Other Treatments After Discharge: ***    Physician Certification: I certify the above information and transfer of Thierno Ramirez  is necessary for the continuing treatment of the diagnosis listed and that she requires {Admit to Appropriate Level of Care:67679:::0} for {GREATER/LESS:362106152} 30 days.      Update Admission H&P: {CHP DME Changes in HandP:598382435:::0}    PHYSICIAN SIGNATURE: Electronically signed by Aldo Soliz MD on 10/9/2020 at 11:21 AM

## 2020-10-09 NOTE — PROGRESS NOTES
Physical Therapy    Facility/Department: [address] MED SURG/TELE  Initial Assessment    NAME: Uziel Dennison  : 1945  MRN: [id number]    Date of Service: 10/9/2020      Attending Provider:  Ade Londono MD    Evaluating PT:  Damaris Zamora. Carla Bustamante P.T. Room #:  2648/5210-A  Diagnosis:  Near Syncope  Pertinent PMHx/PSHx:  Dementia, mild mental retardation  Precautions:  Falls, bed/chair alarm    SUBJECTIVE:    Pt lives at [facility] and ambulated with ww or a cane PTA according to the chart. OBJECTIVE:   Initial Evaluation  Date: 10/9/20 Treatment Short Term/ Long Term   Goals   Was pt agreeable to Eval/treatment? yes     Does pt have pain? No c/o pain     Bed Mobility  NA, pt was found and left sitting up in chair. supervision   Transfers Sit to stand: supervision  Stand to sit: supervision  Stand pivot: SBA with ww  supervision   Ambulation   175 feet plus 15 feet x 2 reps with ww SBA  250 feet with ww supervision   Stair negotiation: ascended and descended NA  NA   AM-PAC 6 Clicks        BLE ROM is WFL. BLE strength is grossly 4/5 to 4+/5. Sensation:  Pt denies numbness and tingling to extremities  Edema:  None noted  Balance: sitting is supervision and standing with ww is supervision to SBA  Endurance: fair+    Patient education  Pt educated on hand placement during transfers. Patient response to education:   Pt verbalized understanding Pt demonstrated skill Pt requires further education in this area   yes yes yes     ASSESSMENT:    Comments:  Pt was found sitting up in chair and agreeable to PT. She walked with ww in the ross and was steady on her feet most of the time, but occasionally had mild unsteady gait and SBA was provided for safety. After amb she sat back down in chair and then requested need to use BR. She walked with ww into BR and transferred on/off commode with supervision using grab bar.   She stood at sink and washed her hands with supervision for balance and then walked back to chair with ww and sat down. Treatment:  Patient practiced and was instructed in the following treatment:     Bed mobility, transfers, ADLs, and gait with ww to improve functional strength and endurance. Pt was left sitting up in chair with call light left by patient. Chair/bed alarm: chair alarm was re-activated. Pt's/ family goals   1. To go back to [facility]. Patient does not understand(s) diagnosis, prognosis, and plan of care. PLAN OF CARE:    Current Treatment Recommendations     [x] Strengthening     [] ROM   [x] Balance Training   [x] Endurance Training   [x] Transfer Training   [x] Gait Training   [] Stair Training   [] Positioning   [x] Safety and Education Training   [] Patient/Caregiver Education   [] HEP  [] Other     PT care will be provided in accordance with the objectives noted above. Exercises and functional mobility practice will be used as well as appropriate assistive devices or modalities to obtain goals. Patient and family education will also be administered as needed. Frequency of treatments: 2-5x/week x 1-2 weeks. Time in  13:05  Time out  13:30    Total Treatment Time  15 minutes     Evaluation Time includes thorough review of current medical information, gathering information on past medical history/social history and prior level of function, completion of standardized testing/informal observation of tasks, assessment of data and education on plan of care and goals. CPT codes:  [x] Low Complexity PT evaluation 99993  [] Moderate Complexity PT evaluation 89627  [] High Complexity PT evaluation 02739  [] PT Re-evaluation 02333  [] Gait training 56284 ** minutes  [] Manual therapy 44143 ** minutes  [x] Therapeutic activities 12815 15 minutes  [] Therapeutic exercises 93800 ** minutes  [] Neuromuscular reeducation 12424 ** minutes     Sy Nielsen., P.T.   License Number: [id number]

## 2020-10-09 NOTE — PROGRESS NOTES
Occupational Therapy  OCCUPATIONAL THERAPY INITIAL EVALUATION      Date:10/9/2020  Patient Name: Blade Henley  MRN: [id number]  : 1945  Room: [address] [place] [address]    Referring Provider: MARGE Richey   Evaluating OT: [place]. Veronique Starr - [id number]    AM-PAC Daily Activity Raw Score: 1624      Recommended Adaptive Equipment: Continue to assess. Diagnosis: Near syncope [R55]   Patient admitted s/p fall at [facility]. Pertinent Medical History: mild mental retardation, dementia, anxiety, arthritis, osteoporosis      Precautions: falls, bed/chair alarms    Home Living: Patient is a resident of [facility]. Equipment Owned: walker    Prior Level of Function (PLOF): Per patient, she was independent with ADLs, needed assistance with IADLs, and independent with functional mobility (with walker) prior to this hospitalization. Pain Level: Patient denied experiencing pain. Cognition: Patient alert and oriented x3. WFL command follow demonstrated. Slowed processing demonstrated. Memory: Fair+   Sequencing: Fair+   Problem Solving: Fair+   Judgement/Safety: Fair+    Functional Assessment:   Initial Eval Status  Date: 10/9/2020 Treatment Status  Date:  Short Term Goals   Feeding Setup     Grooming Min A  Setup  (seated/standing at sink)   UB Dressing Min A  Setup   LB Dressing Mod A  Supervision - with use of AE, as needed/appropriate   Bathing Mod A  Supervision - with use of AE/DME, as needed/appropriate   Toileting Min A  Mod I   Bed Mobility  Supine-to-Sit: SBA      Functional Transfers Sit-to-Stand: Min A   from EOB  Supervision   Functional Mobility Min A   (with walker) for few steps from EOB to bedside chair. Mod I with functional mobility (with device, as needed/appropriate) in order to maximize independence with ADLs/IADLs and other functional tasks.    Balance Sitting: Fair+  (at EOB)  Standing: Fair-  (with walker)  Fair+ dynamic standing balance during completion of ADLs and other functional tasks. Activity Tolerance Fair  Patient will demonstrate Good understanding and consistent implementation of energy conservation techniques and work simplification techniques into ADL routines. Visual/  Perceptual WFL  Patient wears glasses, as needed. N/A   B UE Strength 3+/5  Patient will demonstrate 4-/5 B UE strength in order to maximize independence with ADLs and functional transfers. Long-Term Goal: Patient will increase functional independence to PLOF in order to allow patient to live in least restrictive environment. ROM: Additional Information:    R UE  WFL Mild tremors noted. L UE WFL Mild tremors noted. Hearing: Fair+  Sensation: No complaints of numbness/tingling in B UEs. Tone: WFL   Edema: No    Comments: RN approved patient's participation in [address] [place] [address] activities. Upon arrival, patient supine in bed. At end of session, patient seated in bedside chair with call light and phone within reach, chair alarm activated, physician present, and all lines and tubes intact. Patient would benefit from continued skilled OT to increase safety and independence with completion of ADL/IADL tasks for functional independence and quality of life. Patient education provided regardin) safe transfer techniques, 2) importance of having staff assistance with ADLs and other OOB activities to prevent falls/injury during hospitalization, 3) call light use. Patient indicated [address] understanding.     Eval Complexity: Low    Assessment of Current Deficits:   Functional Mobility [x]  ADLs [x] Strength [x]  Cognition []  Functional Transfers  [x] IADLs [x] Safety Awareness [x]  Endurance [x]  Fine Motor Coordination [] Balance [x] Vision/Perception [] Sensation []   Gross Motor Coordination [] ROM [] Delirium []                  Motor Control []    Plan of Care:   OT treatment to be provided 2-5x/week for 5-7 days PRN to address the following:  [x] ADL Re-Training/AE Recommendations  [x] Energy Conservation

## 2020-10-09 NOTE — CARE COORDINATION
Social Work / Discharge Planning : Patient was admitted from [facility]. Plan to return to [address] [place] [address] Pkwy reached out to  [facility] to follow patient and verify if patient can return at discharge. COVID negative on 10/08. AWait therapy input. Patient uses walker and a cane at [facility]. Patient admitted with Syncope and collopase. SW to follow. Electronically signed by DOROTA Bush on 10/9/20 at 9:31 AM EDT      Addendum : Paul Blackburn from [facility] did speak to A. L. . They will accept back but [facility]. CM updated. [facility] information added to patient AVS and Paul Blackburn will make referral. [facility] will transport patient at discharge. SW to follow.  Electronically signed by DOROTA Bush on 10/9/20 at 11:57 AM EDT

## 2020-10-09 NOTE — H&P
[address] [facility]  Attending History and Physical      CHIEF COMPLAINT:  fall      HISTORY OF PRESENT ILLNESS:      The patient is a 70 y.o. female patient of dr Xiao Dudley who presents with complains of falling down. Patient lives at a facility. She fell down. She did not pass out. She clearly remembers the fall. She denied chest pain, shortness of breath, abdominal pain, nausea, vomiting, fevers, chills and diaphoresis. She is back to her usual state of health. She wants to go back to her rehab facility. Past Medical History:    Past Medical History:   Diagnosis Date    Anxiety     Arthritis     Asthma     Dementia ([facility] [address]     Depression     Depression     Hyperlipidemia     Mild mental retardation     Osteoporosis        Past Surgical History:    Past Surgical History:   Procedure Laterality Date    HERNIA REPAIR      TONSILLECTOMY         Medications Prior to Admission:    Medications Prior to Admission: megestrol acetate (MEGACE) 400 MG/10ML SUSP, Take 400 mg by mouth daily  carbidopa-levodopa (SINEMET)  MG per tablet, Take 1 tablet by mouth 3 times daily  cetirizine (ZYRTEC ALLERGY) 10 MG tablet, Take 10 mg by mouth every morning  vitamin E 600 UNITS capsule, Take 600 Units by mouth every morning   divalproex (DEPAKOTE ER) 500 MG extended release tablet, Take 1,000 mg by mouth nightly   clonazePAM (KLONOPIN) 0.5 MG tablet, Take 0.25 mg by mouth 2 times daily  . risperiDONE (RISPERDAL) 2 MG tablet, Take 2 mg by mouth nightly  docusate sodium (COLACE) 100 MG capsule, Take 100 mg by mouth every morning   Calcium Carb-Cholecalciferol (CALCIUM + D3) 600-200 MG-UNIT TABS, Take 1 tablet by mouth every morning   citalopram (CELEXA) 40 MG tablet, Take 40 mg by mouth nightly   simvastatin (ZOCOR) 40 MG tablet, Take 40 mg by mouth nightly.   alendronate (FOSAMAX) 70 MG tablet, Take 70 mg by mouth every 7 days Mondays  Sod Fluoride-Potassium Nitrate (PREVIDENT 5000 SENSITIVE) 1.1-5 % PSTE, Place onto teeth  naproxen sodium (ALEVE) 220 MG tablet, Take 220 mg by mouth daily as needed for Pain  nystatin (MYCOSTATIN) 652105 UNIT/GM cream, Apply topically 3 times daily  naproxen sodium (ALEVE) 220 MG tablet, Take 220 mg by mouth 2 times daily (with meals)   magnesium hydroxide (MILK OF MAGNESIA) 400 MG/5ML suspension, Take 30 mLs by mouth as needed for Constipation   ibuprofen (ADVIL;MOTRIN) 200 MG tablet, Take 200-400 mg by mouth every 6 hours as needed for Pain   acetaminophen (TYLENOL) 650 MG CR tablet, Take 650 mg by mouth 2 times daily. Allergies:    Pcn [penicillins]    Social History:    reports that she has never smoked. She has never used smokeless tobacco. She reports that she does not drink alcohol or use drugs. Family History:   Family history is unknown by patient. REVIEW OF SYSTEMS:  As above in the HPI, otherwise negative    PHYSICAL EXAM:    Vitals:  /60   Pulse 86   Temp 98.4 °F (36.9 °C) (Oral)   Resp 16   Ht 5' 6\" (1.676 m)   Wt 106 lb 8 oz (48.3 kg)   SpO2 99%   BMI 17.19 kg/m²     General:  Awake, alert, oriented X 3. Frail appearing. [facility] HEENT:  Normocephalic, atraumatic. Pupils equal, round, reactive to light. No scleral icterus. No conjunctival injection. Normal lips, teeth, and gums. No nasal discharge. Neck:  Supple  Heart:  RRR, no murmurs, gallops, rubs  Lungs:  CTA bilaterally, bilat symmetrical expansion, no wheeze, rales, or rhonchi  Abdomen: Bowel sounds present, soft, nontender, no masses, no organomegaly, no peritoneal signs  Extremities:  No clubbing, cyanosis, or edema  Skin:  Warm and dry, no open lesions or rash  Neuro:  Cranial nerves 2-12 intact, no focal deficits. Mild cognitive impairment.     Breast: deferred  Rectal: deferred  Genitalia:  deferred    LABS:    CBC with Differential:    Lab Results   Component Value Date    WBC 5.6 10/09/2020    RBC 3.11 10/09/2020    HGB 10.8 10/09/2020    HCT 32.5 10/09/2020  10/09/2020    .5 10/09/2020    MCH 34.7 10/09/2020    MCHC 33.2 10/09/2020    RDW 13.4 10/09/2020    LYMPHOPCT 43.0 10/09/2020    MONOPCT 9.2 10/09/2020    BASOPCT 0.4 10/09/2020    MONOSABS 0.52 10/09/2020    LYMPHSABS 2.42 10/09/2020    EOSABS 0.04 10/09/2020    BASOSABS 0.02 10/09/2020     CMP:    Lab Results   Component Value Date     10/09/2020    K 3.9 10/09/2020     10/09/2020    CO2 21 10/09/2020    BUN 18 10/09/2020    CREATININE 0.6 10/09/2020    GFRAA >60 10/09/2020    LABGLOM >60 10/09/2020    GLUCOSE 92 10/09/2020    PROT 5.7 10/08/2020    LABALBU 3.7 10/08/2020    CALCIUM 8.3 10/09/2020    BILITOT 0.4 10/08/2020    ALKPHOS 20 10/08/2020    AST 17 10/08/2020    ALT <5 10/08/2020     BMP:    Lab Results   Component Value Date     10/09/2020    K 3.9 10/09/2020     10/09/2020    CO2 21 10/09/2020    BUN 18 10/09/2020    LABALBU 3.7 10/08/2020    CREATININE 0.6 10/09/2020    CALCIUM 8.3 10/09/2020    GFRAA >60 10/09/2020    LABGLOM >60 10/09/2020    GLUCOSE 92 10/09/2020     Magnesium:  No results found for: MG  Phosphorus:  No results found for: PHOS  PT/INR:  No results found for: PROTIME, INR  PTT:  No results found for: APTT, PTT[APTT}  Troponin:    Lab Results   Component Value Date    TROPONINI <0.01 10/09/2020     Last 3 Troponin:    Lab Results   Component Value Date    TROPONINI <0.01 10/09/2020    TROPONINI <0.01 10/08/2020    TROPONINI <0.01 10/08/2020       ASSESSMENT:      Patient Active Problem List   Diagnosis    Hypotension    Dementia ([address]    Mixed hyperlipidemia    Mental retardation    Near syncope         PLAN:    Improved with flluids. Stable. Continue aggressive lipid therapy  Stable. This is not syncope. Patient did not pass out. She clearly remembers falling.    Discharge      Mariaelena Romero MD  11:19 AM  10/9/2020

## 2020-10-10 LAB
ORGANISM: ABNORMAL
URINE CULTURE, ROUTINE: ABNORMAL

## 2020-10-13 LAB
BLOOD CULTURE, ROUTINE: NORMAL
CULTURE, BLOOD 2: NORMAL

## 2020-10-23 ENCOUNTER — HOSPITAL ENCOUNTER (OUTPATIENT)
Age: 75
Discharge: HOME | End: 2020-10-25
Payer: COMMERCIAL

## 2020-10-23 PROCEDURE — U0003 INFECTIOUS AGENT DETECTION BY NUCLEIC ACID (DNA OR RNA); SEVERE ACUTE RESPIRATORY SYNDROME CORONAVIRUS 2 (SARS-COV-2) (CORONAVIRUS DISEASE [COVID-19]), AMPLIFIED PROBE TECHNIQUE, MAKING USE OF HIGH THROUGHPUT TECHNOLOGIES AS DESCRIBED BY CMS-2020-01-R: HCPCS

## 2020-10-25 LAB
SARS-COV-2: NOT DETECTED
SOURCE: NORMAL

## 2021-02-21 ENCOUNTER — HOSPITAL ENCOUNTER (INPATIENT)
Age: 76
LOS: 5 days | Discharge: OTHER INSTITUTION | DRG: 299 | End: 2021-02-26
Attending: EMERGENCY MEDICINE | Admitting: INTERNAL MEDICINE
Payer: COMMERCIAL

## 2021-02-21 ENCOUNTER — APPOINTMENT (OUTPATIENT)
Dept: GENERAL RADIOLOGY | Age: 76
DRG: 299 | End: 2021-02-21
Payer: COMMERCIAL

## 2021-02-21 ENCOUNTER — APPOINTMENT (OUTPATIENT)
Dept: CT IMAGING | Age: 76
DRG: 299 | End: 2021-02-21
Payer: COMMERCIAL

## 2021-02-21 DIAGNOSIS — R26.81 UNSTEADY GAIT: ICD-10-CM

## 2021-02-21 DIAGNOSIS — E86.1 HYPOTENSION DUE TO HYPOVOLEMIA: ICD-10-CM

## 2021-02-21 DIAGNOSIS — I95.89 HYPOTENSION DUE TO HYPOVOLEMIA: ICD-10-CM

## 2021-02-21 DIAGNOSIS — R29.6 FREQUENT FALLS: Primary | ICD-10-CM

## 2021-02-21 PROBLEM — R26.2 UNABLE TO AMBULATE: Status: ACTIVE | Noted: 2021-02-21

## 2021-02-21 LAB
ALBUMIN SERPL-MCNC: 3 G/DL (ref 3.5–5.2)
ALP BLD-CCNC: 25 U/L (ref 35–104)
ALT SERPL-CCNC: 7 U/L (ref 0–32)
ANION GAP SERPL CALCULATED.3IONS-SCNC: 10 MMOL/L (ref 7–16)
ANISOCYTOSIS: ABNORMAL
AST SERPL-CCNC: 54 U/L (ref 0–31)
BACTERIA: NORMAL /HPF
BASOPHILS ABSOLUTE: 0 E9/L (ref 0–0.2)
BASOPHILS RELATIVE PERCENT: 0 % (ref 0–2)
BILIRUB SERPL-MCNC: <0.2 MG/DL (ref 0–1.2)
BILIRUBIN URINE: NEGATIVE
BLOOD, URINE: ABNORMAL
BUN BLDV-MCNC: 29 MG/DL (ref 8–23)
CALCIUM SERPL-MCNC: 8.5 MG/DL (ref 8.6–10.2)
CHLORIDE BLD-SCNC: 107 MMOL/L (ref 98–107)
CLARITY: CLEAR
CO2: 19 MMOL/L (ref 22–29)
COLOR: YELLOW
CREAT SERPL-MCNC: 0.9 MG/DL (ref 0.5–1)
EKG ATRIAL RATE: 83 BPM
EKG P AXIS: 81 DEGREES
EKG P-R INTERVAL: 116 MS
EKG Q-T INTERVAL: 394 MS
EKG QRS DURATION: 70 MS
EKG QTC CALCULATION (BAZETT): 462 MS
EKG R AXIS: 64 DEGREES
EKG T AXIS: -14 DEGREES
EKG VENTRICULAR RATE: 83 BPM
EOSINOPHILS ABSOLUTE: 0.31 E9/L (ref 0.05–0.5)
EOSINOPHILS RELATIVE PERCENT: 5.3 % (ref 0–6)
EPITHELIAL CELLS, UA: NORMAL /HPF
GFR AFRICAN AMERICAN: >60
GFR NON-AFRICAN AMERICAN: >60 ML/MIN/1.73
GLUCOSE BLD-MCNC: 97 MG/DL (ref 74–99)
GLUCOSE URINE: NEGATIVE MG/DL
HCT VFR BLD CALC: 32.8 % (ref 34–48)
HEMOGLOBIN: 10.5 G/DL (ref 11.5–15.5)
IMMATURE GRANULOCYTES #: 0.09 E9/L
IMMATURE GRANULOCYTES %: 1.5 % (ref 0–5)
KETONES, URINE: NEGATIVE MG/DL
LACTIC ACID, SEPSIS: 1.2 MMOL/L (ref 0.5–1.9)
LACTIC ACID, SEPSIS: 2.4 MMOL/L (ref 0.5–1.9)
LEUKOCYTE ESTERASE, URINE: ABNORMAL
LYMPHOCYTES ABSOLUTE: 0.42 E9/L (ref 1.5–4)
LYMPHOCYTES RELATIVE PERCENT: 7.1 % (ref 20–42)
MCH RBC QN AUTO: 33.2 PG (ref 26–35)
MCHC RBC AUTO-ENTMCNC: 32 % (ref 32–34.5)
MCV RBC AUTO: 103.8 FL (ref 80–99.9)
MONOCYTES ABSOLUTE: 0.96 E9/L (ref 0.1–0.95)
MONOCYTES RELATIVE PERCENT: 16.3 % (ref 2–12)
NEUTROPHILS ABSOLUTE: 4.1 E9/L (ref 1.8–7.3)
NEUTROPHILS RELATIVE PERCENT: 69.8 % (ref 43–80)
NITRITE, URINE: NEGATIVE
PDW BLD-RTO: 14 FL (ref 11.5–15)
PH UA: 6 (ref 5–9)
PLATELET # BLD: 99 E9/L (ref 130–450)
PLATELET CONFIRMATION: NORMAL
PMV BLD AUTO: 12.1 FL (ref 7–12)
POTASSIUM SERPL-SCNC: 4.5 MMOL/L (ref 3.5–5)
PRO-BNP: 913 PG/ML (ref 0–125)
PROTEIN UA: NEGATIVE MG/DL
RBC # BLD: 3.16 E12/L (ref 3.5–5.5)
RBC UA: NORMAL /HPF (ref 0–2)
SARS-COV-2, NAAT: NOT DETECTED
SODIUM BLD-SCNC: 136 MMOL/L (ref 132–146)
SPECIFIC GRAVITY UA: >=1.03 (ref 1–1.03)
TOTAL CK: 1848 U/L (ref 20–180)
TOTAL PROTEIN: 5.1 G/DL (ref 6.4–8.3)
TROPONIN: <0.01 NG/ML (ref 0–0.03)
TSH SERPL DL<=0.05 MIU/L-ACNC: 1.24 UIU/ML (ref 0.27–4.2)
UROBILINOGEN, URINE: 1 E.U./DL
VACUOLATED NEUTROPHILS: ABNORMAL
WBC # BLD: 5.9 E9/L (ref 4.5–11.5)
WBC UA: NORMAL /HPF (ref 0–5)

## 2021-02-21 PROCEDURE — 71045 X-RAY EXAM CHEST 1 VIEW: CPT

## 2021-02-21 PROCEDURE — 93005 ELECTROCARDIOGRAM TRACING: CPT | Performed by: STUDENT IN AN ORGANIZED HEALTH CARE EDUCATION/TRAINING PROGRAM

## 2021-02-21 PROCEDURE — 83605 ASSAY OF LACTIC ACID: CPT

## 2021-02-21 PROCEDURE — 87635 SARS-COV-2 COVID-19 AMP PRB: CPT

## 2021-02-21 PROCEDURE — 96372 THER/PROPH/DIAG INJ SC/IM: CPT

## 2021-02-21 PROCEDURE — 1200000000 HC SEMI PRIVATE

## 2021-02-21 PROCEDURE — 81001 URINALYSIS AUTO W/SCOPE: CPT

## 2021-02-21 PROCEDURE — 72125 CT NECK SPINE W/O DYE: CPT

## 2021-02-21 PROCEDURE — G0378 HOSPITAL OBSERVATION PER HR: HCPCS

## 2021-02-21 PROCEDURE — 85025 COMPLETE CBC W/AUTO DIFF WBC: CPT

## 2021-02-21 PROCEDURE — 2580000003 HC RX 258: Performed by: INTERNAL MEDICINE

## 2021-02-21 PROCEDURE — 83880 ASSAY OF NATRIURETIC PEPTIDE: CPT

## 2021-02-21 PROCEDURE — 6360000002 HC RX W HCPCS: Performed by: INTERNAL MEDICINE

## 2021-02-21 PROCEDURE — 99285 EMERGENCY DEPT VISIT HI MDM: CPT

## 2021-02-21 PROCEDURE — 36415 COLL VENOUS BLD VENIPUNCTURE: CPT

## 2021-02-21 PROCEDURE — 82607 VITAMIN B-12: CPT

## 2021-02-21 PROCEDURE — 87088 URINE BACTERIA CULTURE: CPT

## 2021-02-21 PROCEDURE — 96375 TX/PRO/DX INJ NEW DRUG ADDON: CPT

## 2021-02-21 PROCEDURE — 84484 ASSAY OF TROPONIN QUANT: CPT

## 2021-02-21 PROCEDURE — 82306 VITAMIN D 25 HYDROXY: CPT

## 2021-02-21 PROCEDURE — 70450 CT HEAD/BRAIN W/O DYE: CPT

## 2021-02-21 PROCEDURE — 93010 ELECTROCARDIOGRAM REPORT: CPT | Performed by: INTERNAL MEDICINE

## 2021-02-21 PROCEDURE — 6370000000 HC RX 637 (ALT 250 FOR IP): Performed by: INTERNAL MEDICINE

## 2021-02-21 PROCEDURE — 87040 BLOOD CULTURE FOR BACTERIA: CPT

## 2021-02-21 PROCEDURE — 84443 ASSAY THYROID STIM HORMONE: CPT

## 2021-02-21 PROCEDURE — 82550 ASSAY OF CK (CPK): CPT

## 2021-02-21 PROCEDURE — 73030 X-RAY EXAM OF SHOULDER: CPT

## 2021-02-21 PROCEDURE — 2580000003 HC RX 258: Performed by: EMERGENCY MEDICINE

## 2021-02-21 PROCEDURE — 80053 COMPREHEN METABOLIC PANEL: CPT

## 2021-02-21 RX ORDER — ACETAMINOPHEN 325 MG/1
650 TABLET ORAL EVERY 6 HOURS PRN
Status: DISCONTINUED | OUTPATIENT
Start: 2021-02-21 | End: 2021-02-26 | Stop reason: HOSPADM

## 2021-02-21 RX ORDER — SODIUM CHLORIDE 0.9 % (FLUSH) 0.9 %
10 SYRINGE (ML) INJECTION PRN
Status: DISCONTINUED | OUTPATIENT
Start: 2021-02-21 | End: 2021-02-21 | Stop reason: SDUPTHER

## 2021-02-21 RX ORDER — ONDANSETRON 2 MG/ML
4 INJECTION INTRAMUSCULAR; INTRAVENOUS EVERY 6 HOURS PRN
Status: DISCONTINUED | OUTPATIENT
Start: 2021-02-21 | End: 2021-02-26 | Stop reason: HOSPADM

## 2021-02-21 RX ORDER — RISPERIDONE 2 MG/1
2 TABLET, FILM COATED ORAL NIGHTLY
Status: DISCONTINUED | OUTPATIENT
Start: 2021-02-21 | End: 2021-02-26 | Stop reason: HOSPADM

## 2021-02-21 RX ORDER — CLONAZEPAM 0.5 MG/1
0.5 TABLET ORAL NIGHTLY
Status: DISCONTINUED | OUTPATIENT
Start: 2021-02-21 | End: 2021-02-26 | Stop reason: HOSPADM

## 2021-02-21 RX ORDER — POLYETHYLENE GLYCOL 3350 17 G/17G
17 POWDER, FOR SOLUTION ORAL DAILY PRN
Status: DISCONTINUED | OUTPATIENT
Start: 2021-02-21 | End: 2021-02-26 | Stop reason: HOSPADM

## 2021-02-21 RX ORDER — 0.9 % SODIUM CHLORIDE 0.9 %
1000 INTRAVENOUS SOLUTION INTRAVENOUS ONCE
Status: COMPLETED | OUTPATIENT
Start: 2021-02-21 | End: 2021-02-21

## 2021-02-21 RX ORDER — DIVALPROEX SODIUM 500 MG/1
1000 TABLET, EXTENDED RELEASE ORAL NIGHTLY
Status: DISCONTINUED | OUTPATIENT
Start: 2021-02-21 | End: 2021-02-26 | Stop reason: HOSPADM

## 2021-02-21 RX ORDER — CLONAZEPAM 0.5 MG/1
0.5 TABLET ORAL 2 TIMES DAILY
Status: DISCONTINUED | OUTPATIENT
Start: 2021-02-21 | End: 2021-02-21

## 2021-02-21 RX ORDER — SODIUM CHLORIDE 0.9 % (FLUSH) 0.9 %
10 SYRINGE (ML) INJECTION EVERY 12 HOURS SCHEDULED
Status: DISCONTINUED | OUTPATIENT
Start: 2021-02-21 | End: 2021-02-26 | Stop reason: HOSPADM

## 2021-02-21 RX ORDER — ACETAMINOPHEN 650 MG/1
650 SUPPOSITORY RECTAL EVERY 6 HOURS PRN
Status: DISCONTINUED | OUTPATIENT
Start: 2021-02-21 | End: 2021-02-26 | Stop reason: HOSPADM

## 2021-02-21 RX ORDER — SODIUM CHLORIDE 0.9 % (FLUSH) 0.9 %
10 SYRINGE (ML) INJECTION EVERY 12 HOURS SCHEDULED
Status: DISCONTINUED | OUTPATIENT
Start: 2021-02-21 | End: 2021-02-21 | Stop reason: SDUPTHER

## 2021-02-21 RX ORDER — SODIUM CHLORIDE 9 MG/ML
INJECTION, SOLUTION INTRAVENOUS CONTINUOUS
Status: DISCONTINUED | OUTPATIENT
Start: 2021-02-22 | End: 2021-02-26 | Stop reason: HOSPADM

## 2021-02-21 RX ORDER — PROMETHAZINE HYDROCHLORIDE 25 MG/1
12.5 TABLET ORAL EVERY 6 HOURS PRN
Status: DISCONTINUED | OUTPATIENT
Start: 2021-02-21 | End: 2021-02-26 | Stop reason: HOSPADM

## 2021-02-21 RX ORDER — SODIUM CHLORIDE 0.9 % (FLUSH) 0.9 %
10 SYRINGE (ML) INJECTION PRN
Status: DISCONTINUED | OUTPATIENT
Start: 2021-02-21 | End: 2021-02-26 | Stop reason: HOSPADM

## 2021-02-21 RX ADMIN — SODIUM CHLORIDE 1000 ML: 9 INJECTION, SOLUTION INTRAVENOUS at 13:54

## 2021-02-21 RX ADMIN — DIVALPROEX SODIUM 1000 MG: 500 TABLET, EXTENDED RELEASE ORAL at 21:59

## 2021-02-21 RX ADMIN — Medication 10 ML: at 21:59

## 2021-02-21 RX ADMIN — CLONAZEPAM 0.5 MG: 0.5 TABLET ORAL at 22:02

## 2021-02-21 RX ADMIN — RISPERIDONE 2 MG: 2 TABLET, FILM COATED ORAL at 21:59

## 2021-02-21 RX ADMIN — CARBIDOPA AND LEVODOPA 1 TABLET: 25; 100 TABLET ORAL at 21:59

## 2021-02-21 RX ADMIN — CEFTRIAXONE 1000 MG: 1 INJECTION, POWDER, FOR SOLUTION INTRAMUSCULAR; INTRAVENOUS at 21:58

## 2021-02-21 RX ADMIN — ENOXAPARIN SODIUM 40 MG: 40 INJECTION, SOLUTION INTRAVENOUS; SUBCUTANEOUS at 21:58

## 2021-02-21 ASSESSMENT — PAIN DESCRIPTION - LOCATION: LOCATION: ARM

## 2021-02-21 ASSESSMENT — ENCOUNTER SYMPTOMS
ABDOMINAL PAIN: 0
PHOTOPHOBIA: 0
RHINORRHEA: 0
NAUSEA: 0
SORE THROAT: 0
VOMITING: 0
CHEST TIGHTNESS: 0
FACIAL SWELLING: 0
SHORTNESS OF BREATH: 0
BACK PAIN: 0

## 2021-02-21 ASSESSMENT — PAIN SCALES - WONG BAKER: WONGBAKER_NUMERICALRESPONSE: 4

## 2021-02-21 ASSESSMENT — PAIN - FUNCTIONAL ASSESSMENT: PAIN_FUNCTIONAL_ASSESSMENT: ACTIVITIES ARE NOT PREVENTED

## 2021-02-21 ASSESSMENT — PAIN DESCRIPTION - ORIENTATION: ORIENTATION: LEFT;UPPER

## 2021-02-21 NOTE — ED PROVIDER NOTES
Patient is a 68-year-old female with history of hyperlipidemia, dementia that presents emergency room for evaluation after several falls. Patient had 4 falls throughout the morning at her assisted living facility. No injuries were obtained from the falls. Patient does state that she feels lightheaded prior to the falls. Nothing seems to make it better or worse. It has been intermittent and moderate in severity. States this is never happened to her before. She denies hitting her head or loss of consciousness. She does have some mild pain in the left shoulder and some generalized weakness. Denies fever, chills, headache, chest pain, shortness of breath, abdominal pain, nausea, vomiting. Patient was recently treated for urinary tract infection using Bactrim. EMS noted that she was mildly hypotensive when they arrived to  the patient. She was given an IV with small amount of normal saline with mild improvement of her blood pressure. The history is provided by the patient. Review of Systems   Constitutional: Positive for activity change and fatigue. Negative for chills, diaphoresis and fever. HENT: Negative for congestion, facial swelling, nosebleeds, rhinorrhea and sore throat. Eyes: Negative for photophobia and visual disturbance. Respiratory: Negative for chest tightness and shortness of breath. Cardiovascular: Negative for chest pain and palpitations. Gastrointestinal: Negative for abdominal pain, nausea and vomiting. Genitourinary: Negative for decreased urine volume, difficulty urinating, dysuria, flank pain and urgency. Musculoskeletal: Negative for back pain, joint swelling, myalgias, neck pain and neck stiffness. Skin: Negative for pallor and rash. Neurological: Positive for weakness and light-headedness. Negative for dizziness and headaches. Physical Exam  Vitals signs and nursing note reviewed.    Constitutional:       General: She is not in acute distress. Appearance: She is cachectic. She is not ill-appearing or diaphoretic. Comments: Generalized weakness   HENT:      Head: Normocephalic and atraumatic. Eyes:      Extraocular Movements: Extraocular movements intact. Pupils: Pupils are equal, round, and reactive to light. Cardiovascular:      Rate and Rhythm: Normal rate. Pulses: Normal pulses. Pulmonary:      Effort: Pulmonary effort is normal. No respiratory distress. Breath sounds: Normal breath sounds. No wheezing or rhonchi. Abdominal:      Palpations: Abdomen is soft. Tenderness: There is no abdominal tenderness. There is no guarding or rebound. Musculoskeletal:      Right lower leg: No edema. Left lower leg: No edema. Comments: Moving all extremities equally. Skin:     General: Skin is warm and dry. Coloration: Skin is pale. Neurological:      General: No focal deficit present. Mental Status: She is alert. Mental status is at baseline. She is disoriented. Cranial Nerves: No cranial nerve deficit. Sensory: No sensory deficit. Comments: Oriented to person and place. Procedures     MDM  Number of Diagnoses or Management Options  Diagnosis management comments: Patient is a 79-year-old female that presents emergency department for evaluation after falls. Patient resting comfortably and in no obvious distress on exam.  She is slow to respond however there is no focal deficits noted on exam.  Blood work was unremarkable and at patient's baseline. Chest x-ray showed no evidence of pneumonia, pneumothorax, widened mediastinum but did show some bibasilar infiltrates. Patient not hypoxic at this time. BNP was mildly elevated at 931. Rapid Covid was negative. Urinalysis showed no evidence of urinary tract infection. Patient to be admitted for observation for frequent falls and unsteady gait. Patient will likely need placement.   Awaiting callback from hospitalist at that time.  Case discussed with Dr. Aramis Weiner who will be taking over the case. Amount and/or Complexity of Data Reviewed  Clinical lab tests: reviewed  Tests in the radiology section of CPT®: reviewed  Tests in the medicine section of CPT®: reviewed         ED Course as of Feb 22 1436   Sun Feb 21, 2021   [address] Spoke with Dr. Jeromy Ignacio, will admit. [KP]      ED Course User Index  [KP] Radha Grimm, DO       --------------------------------------------- PAST HISTORY ---------------------------------------------  Past Medical History:  has a past medical history of Anxiety, Arthritis, Asthma, Dementia ([facility] [address] Depression, Depression, Hyperlipidemia, Mild mental retardation, Osteoporosis, and Parkinson disease ([facility] [address] Past Surgical History:  has a past surgical history that includes Tonsillectomy and hernia repair. Social History:  reports that she has never smoked. She has never used smokeless tobacco. She reports that she does not drink alcohol or use drugs. Family History: Family history is unknown by patient. The patients home medications have been reviewed.     Allergies: Pcn [penicillins]    -------------------------------------------------- RESULTS -------------------------------------------------    LABS:  Results for orders placed or performed during the hospital encounter of 02/21/21   COVID-19, Rapid    Specimen: Nasopharyngeal Swab   Result Value Ref Range    SARS-CoV-2, NAAT Not Detected Not Detected   Culture, Urine    Specimen: Urine, clean catch   Result Value Ref Range    Urine Culture, Routine       Growth present, evaluating for:  Gram positive organism     CBC Auto Differential   Result Value Ref Range    WBC 5.9 4.5 - 11.5 E9/L    RBC 3.16 (L) 3.50 - 5.50 E12/L    Hemoglobin 10.5 (L) 11.5 - 15.5 g/dL    Hematocrit 32.8 (L) 34.0 - 48.0 %    .8 (H) 80.0 - 99.9 fL    MCH 33.2 26.0 - 35.0 pg    MCHC 32.0 32.0 - 34.5 %    RDW 14.0 11.5 - 15.0 fL    Platelets 99 (L) 927 - 450 E9/L MPV 12.1 (H) 7.0 - 12.0 fL    Neutrophils % 69.8 43.0 - 80.0 %    Immature Granulocytes % 1.5 0.0 - 5.0 %    Lymphocytes % 7.1 (L) 20.0 - 42.0 %    Monocytes % 16.3 (H) 2.0 - 12.0 %    Eosinophils % 5.3 0.0 - 6.0 %    Basophils % 0.0 0.0 - 2.0 %    Neutrophils Absolute 4.10 1.80 - 7.30 E9/L    Immature Granulocytes # 0.09 E9/L    Lymphocytes Absolute 0.42 (L) 1.50 - 4.00 E9/L    Monocytes Absolute 0.96 (H) 0.10 - 0.95 E9/L    Eosinophils Absolute 0.31 0.05 - 0.50 E9/L    Basophils Absolute 0.00 0.00 - 0.20 E9/L    Vacuolated Neutrophils 1+     Anisocytosis 1+    Comprehensive Metabolic Panel   Result Value Ref Range    Sodium 136 132 - 146 mmol/L    Potassium 4.5 3.5 - 5.0 mmol/L    Chloride 107 98 - 107 mmol/L    CO2 19 (L) 22 - 29 mmol/L    Anion Gap 10 7 - 16 mmol/L    Glucose 97 74 - 99 mg/dL    BUN 29 (H) 8 - 23 mg/dL    CREATININE 0.9 0.5 - 1.0 mg/dL    GFR Non-African American >60 >=60 mL/min/1.73    GFR African American >60     Calcium 8.5 (L) 8.6 - 10.2 mg/dL    Total Protein 5.1 (L) 6.4 - 8.3 g/dL    Albumin 3.0 (L) 3.5 - 5.2 g/dL    Total Bilirubin <0.2 0.0 - 1.2 mg/dL    Alkaline Phosphatase 25 (L) 35 - 104 U/L    ALT 7 0 - 32 U/L    AST 54 (H) 0 - 31 U/L   Troponin   Result Value Ref Range    Troponin <0.01 0.00 - 0.03 ng/mL   Urinalysis   Result Value Ref Range    Color, UA Yellow Straw/Yellow    Clarity, UA Clear Clear    Glucose, Ur Negative Negative mg/dL    Bilirubin Urine Negative Negative    Ketones, Urine Negative Negative mg/dL    Specific Gravity, UA >=1.030 1.005 - 1.030    Blood, Urine SMALL (A) Negative    pH, UA 6.0 5.0 - 9.0    Protein, UA Negative Negative mg/dL    Urobilinogen, Urine 1.0 <2.0 E.U./dL    Nitrite, Urine Negative Negative    Leukocyte Esterase, Urine TRACE (A) Negative   Lactate, Sepsis   Result Value Ref Range    Lactic Acid, Sepsis 2.4 (H) 0.5 - 1.9 mmol/L   Lactate, Sepsis   Result Value Ref Range    Lactic Acid, Sepsis 1.2 0.5 - 1.9 mmol/L   Platelet Confirmation Result Value Ref Range    Platelet Confirmation CONFIRMED    Brain Natriuretic Peptide   Result Value Ref Range    Pro- (H) 0 - 125 pg/mL   Microscopic Urinalysis   Result Value Ref Range    WBC, UA 0-1 0 - 5 /HPF    RBC, UA 0-1 0 - 2 /HPF    Epithelial Cells, UA FEW /HPF    Bacteria, UA NONE SEEN None Seen /HPF   CK   Result Value Ref Range    Total CK 1,848 (H) 20 - 180 U/L   Vitamin B12   Result Value Ref Range    Vitamin B-12 717 211 - 946 pg/mL   TSH without Reflex   Result Value Ref Range    TSH 1.240 0.270 - 4.200 uIU/mL   Vitamin D 25 Hydroxy   Result Value Ref Range    Vit D, 25-Hydroxy 30 30 - 100 ng/mL   Basic Metabolic Panel w/ Reflex to MG   Result Value Ref Range    Sodium 136 132 - 146 mmol/L    Potassium reflex Magnesium 3.8 3.5 - 5.0 mmol/L    Chloride 108 (H) 98 - 107 mmol/L    CO2 21 (L) 22 - 29 mmol/L    Anion Gap 7 7 - 16 mmol/L    Glucose 92 74 - 99 mg/dL    BUN 18 8 - 23 mg/dL    CREATININE 0.7 0.5 - 1.0 mg/dL    GFR Non-African American >60 >=60 mL/min/1.73    GFR African American >60     Calcium 8.0 (L) 8.6 - 10.2 mg/dL   CBC auto differential   Result Value Ref Range    WBC 6.3 4.5 - 11.5 E9/L    RBC 3.19 (L) 3.50 - 5.50 E12/L    Hemoglobin 10.7 (L) 11.5 - 15.5 g/dL    Hematocrit 32.9 (L) 34.0 - 48.0 %    .1 (H) 80.0 - 99.9 fL    MCH 33.5 26.0 - 35.0 pg    MCHC 32.5 32.0 - 34.5 %    RDW 13.8 11.5 - 15.0 fL    Platelets 79 (L) 250 - 450 E9/L    MPV 12.1 (H) 7.0 - 12.0 fL    Neutrophils % 71.3 43.0 - 80.0 %    Lymphocytes % 10.4 (L) 20.0 - 42.0 %    Monocytes % 7.0 2.0 - 12.0 %    Eosinophils % 7.0 (H) 0.0 - 6.0 %    Basophils % 0.0 0.0 - 2.0 %    Neutrophils Absolute 4.47 1.80 - 7.30 E9/L    Lymphocytes Absolute 0.95 (L) 1.50 - 4.00 E9/L    Monocytes Absolute 0.44 0.10 - 0.95 E9/L    Eosinophils Absolute 0.44 0.05 - 0.50 E9/L    Basophils Absolute 0.00 0.00 - 0.20 E9/L    Atypical Lymphocytes Relative 4.3 (H) 0.0 - 4.0 %    nRBC 0.0 /100 WBC    Smudge Cells 1+     Vacuolated Neutrophils 1+     Anisocytosis 1+     Polychromasia 1+     Poikilocytes 1+     Matilde Cells 1+     Ovalocytes 1+    Platelet Confirmation   Result Value Ref Range    Platelet Confirmation CONFIRMED    Procalcitonin   Result Value Ref Range    Procalcitonin 0.27 (H) 0.00 - 0.08 ng/mL   EKG 12 Lead   Result Value Ref Range    Ventricular Rate 83 BPM    Atrial Rate 83 BPM    P-R Interval 116 ms    QRS Duration 70 ms    Q-T Interval 394 ms    QTc Calculation (Bazett) 462 ms    P Axis 81 degrees    R Axis 64 degrees    T Axis -14 degrees       RADIOLOGY:  US DUP LOWER EXTREMITIES BILATERAL VENOUS   Final Result   Thrombus identified in the peroneal vein of the right lower extremity. CT HEAD WO CONTRAST   Final Result   CT head without contrast:   1. Limited study due to prominent patient motion artifact. 2. Within this limitation, no skull fracture or acute intracranial   abnormality is seen. .   CT cervical spine without contrast:   1. No fracture or joint dislocation is seen. 2. Degenerative changes, as described. CT CERVICAL SPINE WO CONTRAST   Final Result   CT head without contrast:   1. Limited study due to prominent patient motion artifact. 2. Within this limitation, no skull fracture or acute intracranial   abnormality is seen. .   CT cervical spine without contrast:   1. No fracture or joint dislocation is seen. 2. Degenerative changes, as described. XR CHEST PORTABLE   Final Result   Bilateral pulmonary interstitial infiltrates new since the prior exam.   Appearance is nonspecific could reflect edema or other etiologies      XR SHOULDER LEFT (MIN 2 VIEWS)   Final Result   No acute fractures or dislocations in left shoulder. EKG: This EKG is signed and interpreted by me.     Rate: 83  Rhythm: Sinus  Interpretation: Sinus rhythm with normal QTc, normal MS  Comparison: stable as compared to patient's most recent EKG      ------------------------- NURSING NOTES AND VITALS REVIEWED ---------------------------  Date / Time Roomed:  2/21/2021  1:01 PM  ED Bed Assignment:  [id number]    The nursing notes within the ED encounter and vital signs as below have been reviewed. Patient Vitals for the past 24 hrs:   BP Temp Temp src Pulse Resp SpO2 Height Weight   02/22/21 1244 -- -- -- -- -- -- 5' 6\" (1.676 m) --   02/22/21 0728 (!) 98/52 98.2 °F (36.8 °C) Oral 75 18 95 % -- --   02/22/21 0539 -- -- -- -- -- -- -- 100 lb 5 oz (45.5 kg)   02/21/21 1945 113/60 98.5 °F (36.9 °C) Oral 90 18 97 % -- --   02/21/21 1815 107/64 -- -- 97 -- -- -- --   02/21/21 1745 119/66 -- -- 90 -- 94 % -- --   02/21/21 1715 116/73 -- -- 106 -- 94 % -- --   02/21/21 1643 117/64 -- -- 91 18 94 % -- --   02/21/21 1615 117/64 -- -- 104 -- 97 % -- --   02/21/21 1545 119/70 -- -- 91 -- 98 % -- --   02/21/21 1515 96/64 -- -- 92 -- 95 % -- --   02/21/21 1445 (!) 108/55 -- -- 96 -- 94 % -- --       Oxygen Saturation Interpretation: Normal    ------------------------------------------ PROGRESS NOTES ------------------------------------------  Re-evaluation(s):  Time: 1450  Patients symptoms show no change  Repeat physical examination is not changed    Counseling:  I have spoken with the patient and discussed todays results, in addition to providing specific details for the plan of care and counseling regarding the diagnosis and prognosis. Their questions are answered at this time and they are agreeable with the plan of admission.    --------------------------------- ADDITIONAL PROVIDER NOTES ---------------------------------  Consultations:  Spoke with Dr. Julio Cesar Green. Discussed case. They will admit the patient. This patient's ED course included: a personal history and physicial examination, re-evaluation prior to disposition, multiple bedside re-evaluations, IV medications, cardiac monitoring and continuous pulse oximetry    This patient has remained hemodynamically stable during their ED course. Diagnosis:  1.  Frequent falls    2. Unsteady gait        Disposition:  Patient's disposition: Admit to telemetry  Patient's condition is stable.           Byron Farr,   Resident  02/22/21 0094

## 2021-02-22 ENCOUNTER — APPOINTMENT (OUTPATIENT)
Dept: ULTRASOUND IMAGING | Age: 76
DRG: 299 | End: 2021-02-22
Payer: COMMERCIAL

## 2021-02-22 PROBLEM — E43 SEVERE PROTEIN-CALORIE MALNUTRITION (HCC): Chronic | Status: ACTIVE | Noted: 2021-02-22

## 2021-02-22 PROBLEM — E44.1 MILD PROTEIN-CALORIE MALNUTRITION (HCC): Chronic | Status: ACTIVE | Noted: 2021-02-22

## 2021-02-22 LAB
ANION GAP SERPL CALCULATED.3IONS-SCNC: 7 MMOL/L (ref 7–16)
ANISOCYTOSIS: ABNORMAL
ATYPICAL LYMPHOCYTE RELATIVE PERCENT: 4.3 % (ref 0–4)
BASOPHILS ABSOLUTE: 0 E9/L (ref 0–0.2)
BASOPHILS RELATIVE PERCENT: 0 % (ref 0–2)
BUN BLDV-MCNC: 18 MG/DL (ref 8–23)
BURR CELLS: ABNORMAL
CALCIUM SERPL-MCNC: 8 MG/DL (ref 8.6–10.2)
CHLORIDE BLD-SCNC: 108 MMOL/L (ref 98–107)
CO2: 21 MMOL/L (ref 22–29)
CREAT SERPL-MCNC: 0.7 MG/DL (ref 0.5–1)
EOSINOPHILS ABSOLUTE: 0.44 E9/L (ref 0.05–0.5)
EOSINOPHILS RELATIVE PERCENT: 7 % (ref 0–6)
GFR AFRICAN AMERICAN: >60
GFR NON-AFRICAN AMERICAN: >60 ML/MIN/1.73
GLUCOSE BLD-MCNC: 92 MG/DL (ref 74–99)
HCT VFR BLD CALC: 32.9 % (ref 34–48)
HEMOGLOBIN: 10.7 G/DL (ref 11.5–15.5)
LYMPHOCYTES ABSOLUTE: 0.95 E9/L (ref 1.5–4)
LYMPHOCYTES RELATIVE PERCENT: 10.4 % (ref 20–42)
MCH RBC QN AUTO: 33.5 PG (ref 26–35)
MCHC RBC AUTO-ENTMCNC: 32.5 % (ref 32–34.5)
MCV RBC AUTO: 103.1 FL (ref 80–99.9)
MONOCYTES ABSOLUTE: 0.44 E9/L (ref 0.1–0.95)
MONOCYTES RELATIVE PERCENT: 7 % (ref 2–12)
NEUTROPHILS ABSOLUTE: 4.47 E9/L (ref 1.8–7.3)
NEUTROPHILS RELATIVE PERCENT: 71.3 % (ref 43–80)
NUCLEATED RED BLOOD CELLS: 0 /100 WBC
OVALOCYTES: ABNORMAL
PDW BLD-RTO: 13.8 FL (ref 11.5–15)
PLATELET # BLD: 79 E9/L (ref 130–450)
PLATELET CONFIRMATION: NORMAL
PMV BLD AUTO: 12.1 FL (ref 7–12)
POIKILOCYTES: ABNORMAL
POLYCHROMASIA: ABNORMAL
POTASSIUM REFLEX MAGNESIUM: 3.8 MMOL/L (ref 3.5–5)
PROCALCITONIN: 0.27 NG/ML (ref 0–0.08)
RBC # BLD: 3.19 E12/L (ref 3.5–5.5)
SMUDGE CELLS: ABNORMAL
SODIUM BLD-SCNC: 136 MMOL/L (ref 132–146)
VACUOLATED NEUTROPHILS: ABNORMAL
VITAMIN B-12: 717 PG/ML (ref 211–946)
VITAMIN D 25-HYDROXY: 30 NG/ML (ref 30–100)
WBC # BLD: 6.3 E9/L (ref 4.5–11.5)

## 2021-02-22 PROCEDURE — 2580000003 HC RX 258: Performed by: NURSE PRACTITIONER

## 2021-02-22 PROCEDURE — 80048 BASIC METABOLIC PNL TOTAL CA: CPT

## 2021-02-22 PROCEDURE — 93970 EXTREMITY STUDY: CPT

## 2021-02-22 PROCEDURE — 1200000000 HC SEMI PRIVATE

## 2021-02-22 PROCEDURE — 6370000000 HC RX 637 (ALT 250 FOR IP): Performed by: INTERNAL MEDICINE

## 2021-02-22 PROCEDURE — 85025 COMPLETE CBC W/AUTO DIFF WBC: CPT

## 2021-02-22 PROCEDURE — 2580000003 HC RX 258: Performed by: INTERNAL MEDICINE

## 2021-02-22 PROCEDURE — 6370000000 HC RX 637 (ALT 250 FOR IP): Performed by: NURSE PRACTITIONER

## 2021-02-22 PROCEDURE — G0378 HOSPITAL OBSERVATION PER HR: HCPCS

## 2021-02-22 PROCEDURE — 96365 THER/PROPH/DIAG IV INF INIT: CPT

## 2021-02-22 PROCEDURE — 6360000002 HC RX W HCPCS: Performed by: INTERNAL MEDICINE

## 2021-02-22 PROCEDURE — 96376 TX/PRO/DX INJ SAME DRUG ADON: CPT

## 2021-02-22 PROCEDURE — 84145 PROCALCITONIN (PCT): CPT

## 2021-02-22 PROCEDURE — 2500000003 HC RX 250 WO HCPCS: Performed by: NURSE PRACTITIONER

## 2021-02-22 PROCEDURE — 36415 COLL VENOUS BLD VENIPUNCTURE: CPT

## 2021-02-22 RX ORDER — DOXYCYCLINE HYCLATE 100 MG/1
100 CAPSULE ORAL EVERY 12 HOURS SCHEDULED
Status: DISCONTINUED | OUTPATIENT
Start: 2021-02-22 | End: 2021-02-26 | Stop reason: HOSPADM

## 2021-02-22 RX ADMIN — DOXYCYCLINE HYCLATE 100 MG: 100 CAPSULE ORAL at 11:23

## 2021-02-22 RX ADMIN — CLONAZEPAM 0.5 MG: 0.5 TABLET ORAL at 20:53

## 2021-02-22 RX ADMIN — SODIUM CHLORIDE: 9 INJECTION, SOLUTION INTRAVENOUS at 00:38

## 2021-02-22 RX ADMIN — DOXYCYCLINE HYCLATE 100 MG: 100 CAPSULE ORAL at 22:13

## 2021-02-22 RX ADMIN — DIVALPROEX SODIUM 1000 MG: 500 TABLET, EXTENDED RELEASE ORAL at 20:53

## 2021-02-22 RX ADMIN — CEFTRIAXONE 1000 MG: 1 INJECTION, POWDER, FOR SOLUTION INTRAMUSCULAR; INTRAVENOUS at 17:52

## 2021-02-22 RX ADMIN — CARBIDOPA AND LEVODOPA 1 TABLET: 25; 100 TABLET ORAL at 14:22

## 2021-02-22 RX ADMIN — CARBIDOPA AND LEVODOPA 1 TABLET: 25; 100 TABLET ORAL at 20:53

## 2021-02-22 RX ADMIN — RISPERIDONE 2 MG: 2 TABLET, FILM COATED ORAL at 20:53

## 2021-02-22 RX ADMIN — ENOXAPARIN SODIUM 40 MG: 40 INJECTION, SOLUTION INTRAVENOUS; SUBCUTANEOUS at 08:13

## 2021-02-22 RX ADMIN — CARBIDOPA AND LEVODOPA 1 TABLET: 25; 100 TABLET ORAL at 08:13

## 2021-02-22 RX ADMIN — APIXABAN 10 MG: 5 TABLET, FILM COATED ORAL at 20:54

## 2021-02-22 RX ADMIN — SODIUM CHLORIDE: 9 INJECTION, SOLUTION INTRAVENOUS at 20:54

## 2021-02-22 RX ADMIN — DOXYCYCLINE 100 MG: 100 INJECTION, POWDER, LYOPHILIZED, FOR SOLUTION INTRAVENOUS at 00:30

## 2021-02-22 ASSESSMENT — PAIN SCALES - GENERAL
PAINLEVEL_OUTOF10: 0

## 2021-02-22 NOTE — PROGRESS NOTES
Physical Therapy    Facility/Department: [facility] MED SURG/TELE       NAME: Jong Barnes  : 1945  MRN: [id number]    Date of Service: 2021    PT eval was attempted this am and pt was out of the room at test/procedure. Will re-attempt another time. Jarocho Romeo., P.T.   License Number: [id number]

## 2021-02-22 NOTE — H&P
[address] [facility]  Attending History and Physical      CHIEF COMPLAINT:  Falls at assisted living      HISTORY OF PRESENT ILLNESS:      The patient is a 70 y.o. female patient of Denice Monson MD  who presents with complaints of falls while at assisted living. Patient had 4 falls the day prior to admission. Patient normally walks from a room to be cafeteria. Patient is lightheaded prior to fall.,  Hit her head when she fell. Also complains of left shoulder pain. Patient has history of dementia and Parkinson's disease, is a poor historian. In ED found to have questionable infiltrate on chest x-ray, urinary tract infection. Lower extremity Doppler positive for right leg DVT. Patient denies history of DVT or PE.        Past Medical History:    Past Medical History:   Diagnosis Date    Anxiety     Arthritis     Asthma     D[facility] ([facility]     Depression     Depression     Hyperlipidemia     Mild mental retardation     Osteoporosis     Parkinson disease ([facility]        Past Surgical History:    Past Surgical History:   Procedure Laterality Date    HERNIA REPAIR      TONSILLECTOMY         Medications Prior to Admission:    Medications Prior to Admission: megestrol acetate (MEGACE) 400 MG/10ML SUSP, Take 400 mg by mouth daily  carbidopa-levodopa (SINEMET)  MG per tablet, Take 1 tablet by mouth 3 times daily  cetirizine (ZYRTEC ALLERGY) 10 MG tablet, Take 10 mg by mouth every morning  Sod Fluoride-Potassium Nitrate (PREVIDENT 5000 SENSITIVE) 1.1-5 % PSTE, Place onto teeth  naproxen sodium (ALEVE) 220 MG tablet, Take 220 mg by mouth daily as needed for Pain  nystatin (MYCOSTATIN) 403860 UNIT/GM cream, Apply topically 3 times daily  magnesium hydroxide (MILK OF MAGNESIA) 400 MG/5ML suspension, Take 30 mLs by mouth as needed for Constipation   vitamin E 600 UNITS capsule, Take 600 Units by mouth every morning   divalproex (DEPAKOTE ER) 500 MG extended release tablet, Take 1,000 mg by mouth nightly   clonazePAM (KLONOPIN) 0.5 MG tablet, Take 0.25 mg by mouth 2 times daily  . risperiDONE (RISPERDAL) 2 MG tablet, Take 2 mg by mouth nightly  ibuprofen (ADVIL;MOTRIN) 200 MG tablet, Take 200-400 mg by mouth every 6 hours as needed for Pain   docusate sodium (COLACE) 100 MG capsule, Take 100 mg by mouth every morning   Calcium Carb-Cholecalciferol (CALCIUM + D3) 600-200 MG-UNIT TABS, Take 1 tablet by mouth every morning   citalopram (CELEXA) 40 MG tablet, Take 40 mg by mouth nightly   simvastatin (ZOCOR) 40 MG tablet, Take 40 mg by mouth nightly. acetaminophen (TYLENOL) 650 MG CR tablet, Take 650 mg by mouth 2 times daily. alendronate (FOSAMAX) 70 MG tablet, Take 70 mg by mouth every 7 days Mondays    Allergies:    Pcn [penicillins]    Social History:    reports that she has never smoked. She has never used smokeless tobacco. She reports that she does not drink alcohol or use drugs. Family History:   Family history is unknown by patient. REVIEW OF SYSTEMS:  As above in the HPI, otherwise negative    PHYSICAL EXAM:    Vitals:  BP (!) 98/52   Pulse 75   Temp 98.2 °F (36.8 °C) (Oral)   Resp 18   Ht 5' 6\" (1.676 m)   Wt 100 lb 5 oz (45.5 kg)   SpO2 95%   BMI 16.19 kg/m²     General:  Awake, alert, oriented X 3. Slow to respons  HEENT:  Normocephalic, atraumatic. Pupils equal, round, reactive to light. No scleral icterus. No conjunctival injection. Normal lips, teeth, and gums. No nasal discharge. Neck:  Supple  Heart:  RRR, no murmurs, gallops, rubs  Lungs:  CTA bilaterally, bilat symmetrical expansion, no wheeze, rales, or rhonchi  Abdomen:   Bowel sounds present, soft, nontender, no masses, no organomegaly, no peritoneal signs  Extremities:  Right LE warmth  Skin:  Warm and dry, no open lesions or rash  Neuro:  Cranial nerves 2-12 intact, no focal deficits  Breast: deferred  Rectal: deferred  Genitalia:  deferred    LABS:    CBC:   Lab Results   Component Value Date    WBC 6.3 02/22/2021    RBC 3.19 02/22/2021    HGB 10.7 02/22/2021    HCT 32.9 02/22/2021    .1 02/22/2021    MCH 33.5 02/22/2021    MCHC 32.5 02/22/2021    RDW 13.8 02/22/2021    PLT 79 02/22/2021    MPV 12.1 02/22/2021     BMP:    Lab Results   Component Value Date     02/22/2021    K 3.8 02/22/2021     02/22/2021    CO2 21 02/22/2021    BUN 18 02/22/2021    LABALBU 3.0 02/21/2021    CREATININE 0.7 02/22/2021    CALCIUM 8.0 02/22/2021    GFRAA >60 02/22/2021    LABGLOM >60 02/22/2021    GLUCOSE 92 02/22/2021       ASSESSMENT:      Patient Active Problem List   Diagnosis    Hypotension    Dementia ([address]    Mixed hyperlipidemia    Mental retardation    Near syncope    Unable to ambulate         PLAN:    1. Weakness/falls   Likely secondary to UTI/DVT   PT/OT   TSH, vit D, B12 WNL  2. UTI   UA shows trace LE small blood   Send urine culture, start rocephin  3. RLE DVT   Eliquis with loading dose  4. Rhabdomyolysis   Secondary to fall, continue with IVF  5. PNA - questionalbe infiltrate   Check procalcitonin   Continue rocephin + doxy  6. Parkinson's disease - continue sinemet  7.  DVT px - eliquis    Please call my cell phone between 8pm-6am Demetrius Meade MD  10:58 AM  2/22/2021

## 2021-02-22 NOTE — PROGRESS NOTES
Occupational Therapy  Date:2/22/2021  Patient Name: Re Jarrett  Room: 1645/6058-P     Occupational Therapy (OT) order received, patient's medical record reviewed, and OT evaluation attempted this date; OT evaluation held, per nursing, secondary to patient not appropriate for OOB activities at this time. OT evaluation to be re-attempted at later date, as able/appropriate. Monique Ryan, OTR/L  License Number: [id number]

## 2021-02-22 NOTE — PROGRESS NOTES
Dr Apolinar Castillo made aware via perfect serve text, of US BLE results waiting for a reply/new orders

## 2021-02-22 NOTE — PROGRESS NOTES
Comprehensive Nutrition Assessment    Type and Reason for Visit:  Initial, Positive Nutrition Screen    Nutrition Recommendations/Plan: Continue current diet, Start Ensure TID    Nutrition Assessment:  Pt admit 2/2 falls w/ UTI, PNA and h/o Dementia & parkinson's disease. Noted weight loss pta. Will add ONS and continue to monitor. Malnutrition Assessment:  Malnutrition Status:  Mild malnutrition    Context:  Chronic Illness     Findings of the 6 clinical characteristics of malnutrition:  Energy Intake:  7 - 75% or less estimated energy requirements for 1 month or longer  Weight Loss:  1 - Mild weight loss (specify amount and time period)(5.7% wt loss x 4 months, not significant)     Body Fat Loss:  Unable to assess     Muscle Mass Loss:  Unable to assess    Fluid Accumulation:  No significant fluid accumulation     Strength:  Not Performed    Estimated Daily Nutrient Needs:  Energy (kcal):  0133-9082; Weight Used for Energy Requirements:  Current     Protein (g):  70-80; Weight Used for Protein Requirements:  Current(1.5-1.8)        Fluid (ml/day):  9268-8427; Method Used for Fluid Requirements:  1 ml/kcal      Nutrition Related Findings:  disoriented x3, abd WDL, weakness, no noted edema, fluids WDL      Wounds:  None       Current Nutrition Therapies:    DIET GENERAL;     Anthropometric Measures:  · Height: 5' 6\" (167.6 cm)  · Current Body Weight: 100 lb (45.4 kg)(2/22)   · Admission Body Weight: 100 lb (45.4 kg)(2/21 bed)    · Usual Body Weight: 106 lb (48.1 kg)(10/2020 bed scale, per EMR)     · Ideal Body Weight: 130 lbs; % Ideal Body Weight 76.9 %   · BMI: 16.1  · BMI Categories: Underweight (BMI less than 22) age over 72       Nutrition Diagnosis:   · Mild malnutrition, In context of chronic illness related to cognitive or neurological impairment as evidenced by poor intake prior to admission, weight loss(5.7% wt loss x 4 months)    Nutrition Interventions:   Food and/or Nutrient Delivery:  Continue Current Diet, Start Oral Nutrition Supplement  Nutrition Education/Counseling:  Education not appropriate   Coordination of Nutrition Care:  Continue to monitor while inpatient    Goals:  pt to consume >75% meals/ONS       Nutrition Monitoring and Evaluation:   Food/Nutrient Intake Outcomes:  Food and Nutrient Intake, Supplement Intake  Physical Signs/Symptoms Outcomes:  Biochemical Data, GI Status, Fluid Status or Edema, Nutrition Focused Physical Findings, Skin, Weight     Discharge Planning:    Continue Oral Nutrition Supplement     Electronically signed by Osmar Villasenor MS, RD, LD on 2/22/21 at 12:52 PM EST    Contact: [phone]

## 2021-02-22 NOTE — CARE COORDINATION
Social Work / Discharge Planning : Patient was admitted from [facility]. Patient admitted with dx of unable to ambulate and has had several falls. . SW reached out to  Rhiannon Garcia [facility] to follow patient and verify if patient can return at discharge. AWait therapy input. Patient uses walker and a cane at [address] [place] [address] to follow. Electronically signed by DOROTA Jolly on 2/22/21 at 9:26 AM EST      Addendum : Rhiannon Garcia from [facility] stated that patient CAN return to [place]. and they can assist with transport at discharge. AWait therapy input to support [facility] to follow.  Electronically signed by DOROTA Jolly on 2/22/2021 at 12:23 PM

## 2021-02-22 NOTE — PROGRESS NOTES
Notified Sr. Nelson of patients total ck and that her rt leg is hot to the touch and right knee a little swollen.

## 2021-02-23 LAB
ANION GAP SERPL CALCULATED.3IONS-SCNC: 8 MMOL/L (ref 7–16)
ATYPICAL LYMPHOCYTE RELATIVE PERCENT: 4.3 % (ref 0–4)
BASOPHILS ABSOLUTE: 0 E9/L (ref 0–0.2)
BASOPHILS RELATIVE PERCENT: 0 % (ref 0–2)
BUN BLDV-MCNC: 21 MG/DL (ref 8–23)
BURR CELLS: ABNORMAL
CALCIUM SERPL-MCNC: 8.2 MG/DL (ref 8.6–10.2)
CHLORIDE BLD-SCNC: 108 MMOL/L (ref 98–107)
CO2: 21 MMOL/L (ref 22–29)
CREAT SERPL-MCNC: 0.8 MG/DL (ref 0.5–1)
EOSINOPHILS ABSOLUTE: 0.47 E9/L (ref 0.05–0.5)
EOSINOPHILS RELATIVE PERCENT: 6.1 % (ref 0–6)
GFR AFRICAN AMERICAN: >60
GFR NON-AFRICAN AMERICAN: >60 ML/MIN/1.73
GLUCOSE BLD-MCNC: 94 MG/DL (ref 74–99)
HCT VFR BLD CALC: 33.6 % (ref 34–48)
HEMOGLOBIN: 10.8 G/DL (ref 11.5–15.5)
LYMPHOCYTES ABSOLUTE: 2.16 E9/L (ref 1.5–4)
LYMPHOCYTES RELATIVE PERCENT: 23.5 % (ref 20–42)
MCH RBC QN AUTO: 33.1 PG (ref 26–35)
MCHC RBC AUTO-ENTMCNC: 32.1 % (ref 32–34.5)
MCV RBC AUTO: 103.1 FL (ref 80–99.9)
MONOCYTES ABSOLUTE: 0.31 E9/L (ref 0.1–0.95)
MONOCYTES RELATIVE PERCENT: 4.3 % (ref 2–12)
MYELOCYTE PERCENT: 0.9 % (ref 0–0)
NEUTROPHILS ABSOLUTE: 4.77 E9/L (ref 1.8–7.3)
NEUTROPHILS RELATIVE PERCENT: 60.9 % (ref 43–80)
NUCLEATED RED BLOOD CELLS: 0 /100 WBC
OVALOCYTES: ABNORMAL
PDW BLD-RTO: 14 FL (ref 11.5–15)
PLATELET # BLD: 93 E9/L (ref 130–450)
PLATELET CONFIRMATION: NORMAL
PMV BLD AUTO: 12.2 FL (ref 7–12)
POIKILOCYTES: ABNORMAL
POTASSIUM SERPL-SCNC: 4.2 MMOL/L (ref 3.5–5)
RBC # BLD: 3.26 E12/L (ref 3.5–5.5)
SODIUM BLD-SCNC: 137 MMOL/L (ref 132–146)
TOTAL CK: 761 U/L (ref 20–180)
URINE CULTURE, ROUTINE: NORMAL
VACUOLATED NEUTROPHILS: ABNORMAL
WBC # BLD: 7.7 E9/L (ref 4.5–11.5)

## 2021-02-23 PROCEDURE — 6370000000 HC RX 637 (ALT 250 FOR IP): Performed by: NURSE PRACTITIONER

## 2021-02-23 PROCEDURE — G0378 HOSPITAL OBSERVATION PER HR: HCPCS

## 2021-02-23 PROCEDURE — 85025 COMPLETE CBC W/AUTO DIFF WBC: CPT

## 2021-02-23 PROCEDURE — 2580000003 HC RX 258: Performed by: NURSE PRACTITIONER

## 2021-02-23 PROCEDURE — 97165 OT EVAL LOW COMPLEX 30 MIN: CPT

## 2021-02-23 PROCEDURE — 1200000000 HC SEMI PRIVATE

## 2021-02-23 PROCEDURE — 6370000000 HC RX 637 (ALT 250 FOR IP): Performed by: INTERNAL MEDICINE

## 2021-02-23 PROCEDURE — 97530 THERAPEUTIC ACTIVITIES: CPT

## 2021-02-23 PROCEDURE — 6360000002 HC RX W HCPCS: Performed by: INTERNAL MEDICINE

## 2021-02-23 PROCEDURE — 36415 COLL VENOUS BLD VENIPUNCTURE: CPT

## 2021-02-23 PROCEDURE — 82550 ASSAY OF CK (CPK): CPT

## 2021-02-23 PROCEDURE — 2580000003 HC RX 258: Performed by: INTERNAL MEDICINE

## 2021-02-23 PROCEDURE — 96376 TX/PRO/DX INJ SAME DRUG ADON: CPT

## 2021-02-23 PROCEDURE — 80048 BASIC METABOLIC PNL TOTAL CA: CPT

## 2021-02-23 RX ADMIN — DOXYCYCLINE HYCLATE 100 MG: 100 CAPSULE ORAL at 10:09

## 2021-02-23 RX ADMIN — CLONAZEPAM 0.5 MG: 0.5 TABLET ORAL at 21:19

## 2021-02-23 RX ADMIN — RIVAROXABAN 15 MG: 15 TABLET, FILM COATED ORAL at 21:19

## 2021-02-23 RX ADMIN — CARBIDOPA AND LEVODOPA 1 TABLET: 25; 100 TABLET ORAL at 10:10

## 2021-02-23 RX ADMIN — DOXYCYCLINE HYCLATE 100 MG: 100 CAPSULE ORAL at 22:46

## 2021-02-23 RX ADMIN — ACETAMINOPHEN 650 MG: 325 TABLET ORAL at 11:53

## 2021-02-23 RX ADMIN — DIVALPROEX SODIUM 1000 MG: 500 TABLET, EXTENDED RELEASE ORAL at 21:19

## 2021-02-23 RX ADMIN — CARBIDOPA AND LEVODOPA 1 TABLET: 25; 100 TABLET ORAL at 13:05

## 2021-02-23 RX ADMIN — CEFTRIAXONE 1000 MG: 1 INJECTION, POWDER, FOR SOLUTION INTRAMUSCULAR; INTRAVENOUS at 19:13

## 2021-02-23 RX ADMIN — SODIUM CHLORIDE: 9 INJECTION, SOLUTION INTRAVENOUS at 21:19

## 2021-02-23 RX ADMIN — CARBIDOPA AND LEVODOPA 1 TABLET: 25; 100 TABLET ORAL at 21:19

## 2021-02-23 RX ADMIN — RISPERIDONE 2 MG: 2 TABLET, FILM COATED ORAL at 21:19

## 2021-02-23 ASSESSMENT — PAIN SCALES - GENERAL
PAINLEVEL_OUTOF10: 10
PAINLEVEL_OUTOF10: 0
PAINLEVEL_OUTOF10: 7

## 2021-02-23 ASSESSMENT — PAIN DESCRIPTION - PAIN TYPE: TYPE: ACUTE PAIN

## 2021-02-23 ASSESSMENT — PAIN DESCRIPTION - DESCRIPTORS: DESCRIPTORS: CONSTANT;ACHING;DISCOMFORT

## 2021-02-23 ASSESSMENT — PAIN DESCRIPTION - LOCATION: LOCATION: LEG

## 2021-02-23 ASSESSMENT — PAIN - FUNCTIONAL ASSESSMENT: PAIN_FUNCTIONAL_ASSESSMENT: ACTIVITIES ARE NOT PREVENTED

## 2021-02-23 ASSESSMENT — PAIN DESCRIPTION - ONSET: ONSET: ON-GOING

## 2021-02-23 ASSESSMENT — PAIN DESCRIPTION - ORIENTATION: ORIENTATION: RIGHT;LEFT

## 2021-02-23 ASSESSMENT — PAIN DESCRIPTION - FREQUENCY: FREQUENCY: CONTINUOUS

## 2021-02-23 ASSESSMENT — PAIN DESCRIPTION - PROGRESSION: CLINICAL_PROGRESSION: NOT CHANGED

## 2021-02-23 NOTE — PROGRESS NOTES
P Quality Flow/Interdisciplinary Rounds Progress Note        Quality Flow Rounds held on February 23, 2021    Disciplines Attending:  Bedside Nurse, ,  and Nursing Unit Leadership    Tretha Holter was admitted on 2/21/2021  1:01 PM    Anticipated Discharge Date:  Expected Discharge Date: 02/25/21    Disposition:    Eleazar Score:  Eleazar Scale Score: 15    Readmission Risk              Risk of Unplanned Readmission:        13           Discussed patient goal for the day, patient clinical progression, and barriers to discharge.   The following Goal(s) of the Day/Commitment(s) have been identified:  await PT/OT yvette Suarez  February 23, 2021

## 2021-02-23 NOTE — PROGRESS NOTES
Subjective:    Patient seen and examined at bedside, more awake today. Planing of rash and lower extremity pruritic in nature and upper chest.  Still has mild right lower extremity pain    Objective:    BP (!) 109/58   Pulse 85   Temp 98.1 °F (36.7 °C)   Resp 17   Ht 5' 6\" (1.676 m)   Wt 110 lb 0.2 oz (49.9 kg)   SpO2 97%   BMI 17.76 kg/m²     Current medications that patient is taking have been reviewed. GEN: NAD AAOx3  Heart:  RRR, no murmurs, gallops, or rubs. Lungs:  CTA bilaterally, no wheeze, rales or rhonchi  Abd: bowel sounds present, soft, nontender, nondistended, no masses  Extrem:  No cyanosis or edema    CBC:   Lab Results   Component Value Date    WBC 7.7 02/23/2021    RBC 3.26 02/23/2021    HGB 10.8 02/23/2021    HCT 33.6 02/23/2021    .1 02/23/2021    MCH 33.1 02/23/2021    MCHC 32.1 02/23/2021    RDW 14.0 02/23/2021    PLT 93 02/23/2021    MPV 12.2 02/23/2021     BMP:    Lab Results   Component Value Date     02/23/2021    K 4.2 02/23/2021    K 3.8 02/22/2021     02/23/2021    CO2 21 02/23/2021    BUN 21 02/23/2021    LABALBU 3.0 02/21/2021    CREATININE 0.8 02/23/2021    CALCIUM 8.2 02/23/2021    GFRAA >60 02/23/2021    LABGLOM >60 02/23/2021    GLUCOSE 94 02/23/2021        Assessment:    Patient Active Problem List   Diagnosis    Hypotension    Dementia ([address]    Mixed hyperlipidemia    Mental retardation    Near syncope    Unable to ambulate    Mild protein-calorie malnutrition ([address]       Plan:       1. Weakness/falls              Likely secondary to UTI/DVT              PT/OT pending              TSH, vit D, B12 WNL  2. UTI              UA shows trace LE small blood              Urine culture 10-100k mixed regine  3. RLE DVT              Change eliquis to xarelto, mild pruritic rash  4. Rhabdomyolysis              Secondary to fall, continue with IVF   CK trending down  5. PNA - procal 0.27              Continue rocephin + doxy  6.  Parkinson's disease - continue sinemet  7.  DVT px - eliquis       Please call my cell phone between 8pm-6am [phone]    4:36 PM  2/23/2021

## 2021-02-23 NOTE — CARE COORDINATION
Social Work / Discharge Planning: SW touched base with john from [place] and initial plan for patient to return back to [facility] but actually need to wait and see what therapy notes say. Patient has NOT been able to work with them due to current status. Will discuss [facility] vs SNF with john once all input has been received. SW to follow.  Electronically signed by DOROTA Kennedy on 2/23/21 at 12:36 PM EST

## 2021-02-23 NOTE — PROGRESS NOTES
Occupational Therapy  OCCUPATIONAL THERAPY INITIAL EVALUATION      Date:2021  Patient Name: Laura Jeronimo  MRN: [id number]  : 1945  [facility]    Referring Provider: Ally Dent MD  Evaluating OT: Deana Mancera. Magdalena Foster - [id number]    AM-PAC Daily Activity Raw Score: 13/24    Recommended Adaptive Equipment: Continue to assess. Diagnosis: Unable to ambulate [R26.2]    Patient presented to hospital s/p fall at [facility]; patient found to have R LE DVT. Pertinent Medical History: dementia, Parkinson's disease, anxiety, osteoporosis, depression, arthritis     Precautions: falls, tremors, bed alarm, skin integrity    Home Living: Patient reported that she is a resident of [facility] [employer] [facility]. Equipment Owned: walker    Prior Level of Function (PLOF): Patient reported that [facility] staff members assist her with completion of ADLs, as needed. Patient stated that she had used a walker for functional mobility at the [facility]. Patient is a questionable historian; no family/caregiver present to verify information gathered. Pain Level: Patient reported experiencing pain in B LEs, but did not rate her pain (nursing aware). Cognition: Patient alert and oriented grossly; confusion demonstrated occasionally. WFL command follow demonstrated. Decreased insight to current abilities/limitations demonstrated. Patient is a questionable historian. Memory: Fair   Sequencing: Fair   Problem Solving: Fair   Judgement/Safety: Impaired    Impulsivity demonstrated. Functional Assessment:   Initial Eval Status  Date: 2021 Treatment Status  Date:  Short Term Goals   Feeding SBA  Setup   Grooming Mod A  Setup  (seated)   UB Dressing Mod A  SBA   LB Dressing Max A  Min A - with use of AE, as needed/appropriate   Bathing Max A  Min A - with use of AE/DME, as needed/appropriate   Toileting Max A  Min A   Bed Mobility  Supine-to-Sit: Max A  Sit-to-Supine: Max A  Log Rolling: Mod A for adjustment of bed linens.       Functional Transfers Sit-to-Stand: Mod A   from EOB  Posterior lean demonstrated. CGA   Functional Mobility Patient unable to take side steps toward HOB secondary to posterior lean. CGA with functional mobility (with device, as needed/appropriate) in order to maximize independence with ADLs and other functional tasks. Balance Sitting: Fair  (at EOB)  Standing: Poor  (with walker)  Fair dynamic standing balance during completion of ADLs and other functional tasks. Activity Tolerance Limited secondary to dizziness. Patient tolerated ~8 minutes of EOB sitting. Patient will demonstrate Good understanding and consistent implementation of energy conservation techniques and work simplification techniques into ADL routines. Visual/  Perceptual WFL grossly     N/A   B UE Strength Proximally: 3-/5  Distally: 3+/5  Patient will demonstrate 4-/5 distal B UE strength in order to maximize independence with ADLs and functional transfers. Long-Term Goal: Patient will increase functional independence to PLOF in order to allow patient to live in least restrictive environment. ROM: Additional Information:    R UE  WFL grossly    L UE WFL grossly      Hearing: WFL  Sensation: No complaints of numbness/tingling in B UEs. Tone: WFL  Edema: Yes - R LE    Comments: RN approved patient's participation in [address] [place] [address] activities. Upon arrival, patient supine in bed. At end of session, patient supine in bed with call light and phone within reach, bed alarm activated, and all lines and tubes intact. Patient would benefit from continued skilled OT to increase safety and independence with completion of ADL tasks for functional independence and quality of life. Treatment: OT treatment provided this date included:    Instruction/training on safety and adapted techniques for completion of ADLs.   Instruction/training on safe functional mobility/transfer techniques.      Patient education provided regardin) importance of having staff assistance with ADLs and other OOB activities during hospitalization to prevent falls/injury, 2) call light use, 3) techniques to maximize safety with use of walker. Patient indicated limited understanding. Further skilled OT treatment indicated to increase patient's safety and independence with completion of ADL/IADL tasks in order to maximize patient's functional independence and quality of life.     Assessment of Current Deficits:   Functional Mobility [x]  ADLs [x] Strength [x]  Cognition []  Functional Transfers  [x] IADLs [x] Safety Awareness [x]  Endurance [x]  Fine Motor Coordination [] Balance [x] Vision/Perception [] Sensation []   Gross Motor Coordination [] ROM [] Delirium []                  Motor Control []    Plan of Care: 2-5 days/week for 1-2 weeks PRN  [x]ADL retraining/adaptive techniques and AE recommendations to increase functional independence within precautions  [x]Energy conservation techniques to improve tolerance for ADLs/IADLs  [x]Functional transfer/mobility training/DME recommendations for increased independence, safety and fall prevention  [x]Patient/family education to increase safety and functional independence  [x]Environmental modifications for safe mobility and completion of ADLs/IADLs  []Cognitive retraining exercises to improve problem solving skills & safe participation in ADLs/IADLs   []Sensory re-education techniques to improve extremity awareness, maintain skin integrity and improve hand function   []Visual/Perceptual retraining  to improve body awareness and safety during transfers and ADLs   []Splinting/positioning needs to maintain joint/skin integrity and prevent contractures   [x]Therapeutic activity to improve functional performance during ADLs/IADLs  [x]Therapeutic exercise to improve tolerance and functional strength for ADLs/IADLs  [x]Balance retraining/tolerance tasks for facilitation of postural control with dynamic challenges during ADLs   [x]Neuromuscular re-education: facilitate righting/equilibrium reactions, midline orientation, scapular stability/mobility, normalize muscle tone, and facilitate active functional movement/attention  []Delirium prevention/treatment   []Positioning to improve functional independence and prevent skin breakdown   []Other:     Rehab Potential: Good for established goals. Patient / Family Goal: No goal stated. Patient and/or family were instructed on functional diagnosis, prognosis/goals, and OT plan of care. Demonstrated limited understanding. Eval Complexity: Low    Time In: 1400  Time Out: 1424  Total Treatment Time: 10 minutes      Minutes Units   OT Eval Low 20983 10 1   OT Eval Medium 03757     OT Eval High 11630     OT Re-Eval [id number]     Therapeutic Ex 42615     Therapeutic Activities 12205 14 1   ADL/Self Care 49817     Orthotic Management 17098     Neuro Re-Ed 17678     Non-Billable Time N/A ---     Evaluation time includes thorough review of current medical information, gathering information on past medical history/social history and prior level of function, completion of standardized testing/informal observation of tasks, assessment of data, and education on plan of care and goals. Monique Lund, OTR/L  License Number: [id number]

## 2021-02-23 NOTE — PLAN OF CARE
Problem: Falls - Risk of:  Goal: Will remain free from falls  Description: Will remain free from falls  Outcome: Met This Shift     Problem: Skin Integrity:  Goal: Will show no infection signs and symptoms  Description: Will show no infection signs and symptoms  Outcome: Met This Shift     Problem: Injury - Risk of, Physical Injury:  Goal: Will remain free from falls  Description: Will remain free from falls  Outcome: Met This Shift

## 2021-02-24 LAB
ANION GAP SERPL CALCULATED.3IONS-SCNC: 8 MMOL/L (ref 7–16)
ANISOCYTOSIS: ABNORMAL
APTT: 33.6 SEC (ref 24.5–35.1)
ATYPICAL LYMPHOCYTE RELATIVE PERCENT: 1.8 % (ref 0–4)
ATYPICAL LYMPHOCYTE RELATIVE PERCENT: 7.1 % (ref 0–4)
BASOPHILS ABSOLUTE: 0 E9/L (ref 0–0.2)
BASOPHILS ABSOLUTE: 0.14 E9/L (ref 0–0.2)
BASOPHILS RELATIVE PERCENT: 0 % (ref 0–2)
BASOPHILS RELATIVE PERCENT: 1.8 % (ref 0–2)
BUN BLDV-MCNC: 22 MG/DL (ref 8–23)
CALCIUM SERPL-MCNC: 8.1 MG/DL (ref 8.6–10.2)
CHLORIDE BLD-SCNC: 112 MMOL/L (ref 98–107)
CO2: 20 MMOL/L (ref 22–29)
CREAT SERPL-MCNC: 0.5 MG/DL (ref 0.5–1)
EOSINOPHILS ABSOLUTE: 0.14 E9/L (ref 0.05–0.5)
EOSINOPHILS ABSOLUTE: 0.47 E9/L (ref 0.05–0.5)
EOSINOPHILS RELATIVE PERCENT: 1.8 % (ref 0–6)
EOSINOPHILS RELATIVE PERCENT: 6.2 % (ref 0–6)
GFR AFRICAN AMERICAN: >60
GFR NON-AFRICAN AMERICAN: >60 ML/MIN/1.73
GLUCOSE BLD-MCNC: 90 MG/DL (ref 74–99)
HCT VFR BLD CALC: 31.5 % (ref 34–48)
HCT VFR BLD CALC: 32.1 % (ref 34–48)
HEMOGLOBIN: 10.1 G/DL (ref 11.5–15.5)
HEMOGLOBIN: 10.8 G/DL (ref 11.5–15.5)
INR BLD: 1.3
LYMPHOCYTES ABSOLUTE: 3.54 E9/L (ref 1.5–4)
LYMPHOCYTES ABSOLUTE: 3.8 E9/L (ref 1.5–4)
LYMPHOCYTES RELATIVE PERCENT: 39.3 % (ref 20–42)
LYMPHOCYTES RELATIVE PERCENT: 47.8 % (ref 20–42)
MCH RBC QN AUTO: 33.2 PG (ref 26–35)
MCH RBC QN AUTO: 34.6 PG (ref 26–35)
MCHC RBC AUTO-ENTMCNC: 32.1 % (ref 32–34.5)
MCHC RBC AUTO-ENTMCNC: 33.6 % (ref 32–34.5)
MCV RBC AUTO: 102.9 FL (ref 80–99.9)
MCV RBC AUTO: 103.6 FL (ref 80–99.9)
MONOCYTES ABSOLUTE: 0.38 E9/L (ref 0.1–0.95)
MONOCYTES ABSOLUTE: 0.46 E9/L (ref 0.1–0.95)
MONOCYTES RELATIVE PERCENT: 5.3 % (ref 2–12)
MONOCYTES RELATIVE PERCENT: 6.2 % (ref 2–12)
MYELOCYTE PERCENT: 0.9 % (ref 0–0)
MYELOCYTE PERCENT: 1.8 % (ref 0–0)
NEUTROPHILS ABSOLUTE: 2.96 E9/L (ref 1.8–7.3)
NEUTROPHILS ABSOLUTE: 3.39 E9/L (ref 1.8–7.3)
NEUTROPHILS RELATIVE PERCENT: 38 % (ref 43–80)
NEUTROPHILS RELATIVE PERCENT: 42 % (ref 43–80)
NUCLEATED RED BLOOD CELLS: 0 /100 WBC
NUCLEATED RED BLOOD CELLS: 0 /100 WBC
PDW BLD-RTO: 14.2 FL (ref 11.5–15)
PDW BLD-RTO: 14.2 FL (ref 11.5–15)
PLATELET # BLD: 120 E9/L (ref 130–450)
PLATELET # BLD: 150 E9/L (ref 130–450)
PMV BLD AUTO: 10.6 FL (ref 7–12)
PMV BLD AUTO: 11.5 FL (ref 7–12)
POLYCHROMASIA: ABNORMAL
POTASSIUM SERPL-SCNC: 4.1 MMOL/L (ref 3.5–5)
PROTHROMBIN TIME: 15.8 SEC (ref 9.3–12.4)
RBC # BLD: 3.04 E12/L (ref 3.5–5.5)
RBC # BLD: 3.12 E12/L (ref 3.5–5.5)
SARS-COV-2, NAAT: NOT DETECTED
SODIUM BLD-SCNC: 140 MMOL/L (ref 132–146)
TOXIC GRANULATION: ABNORMAL
VACUOLATED NEUTROPHILS: ABNORMAL
WBC # BLD: 7.6 E9/L (ref 4.5–11.5)
WBC # BLD: 7.7 E9/L (ref 4.5–11.5)

## 2021-02-24 PROCEDURE — 80048 BASIC METABOLIC PNL TOTAL CA: CPT

## 2021-02-24 PROCEDURE — G0378 HOSPITAL OBSERVATION PER HR: HCPCS

## 2021-02-24 PROCEDURE — 1200000000 HC SEMI PRIVATE

## 2021-02-24 PROCEDURE — 85025 COMPLETE CBC W/AUTO DIFF WBC: CPT

## 2021-02-24 PROCEDURE — 87635 SARS-COV-2 COVID-19 AMP PRB: CPT

## 2021-02-24 PROCEDURE — 6370000000 HC RX 637 (ALT 250 FOR IP): Performed by: NURSE PRACTITIONER

## 2021-02-24 PROCEDURE — 97161 PT EVAL LOW COMPLEX 20 MIN: CPT

## 2021-02-24 PROCEDURE — 85730 THROMBOPLASTIN TIME PARTIAL: CPT

## 2021-02-24 PROCEDURE — 97535 SELF CARE MNGMENT TRAINING: CPT

## 2021-02-24 PROCEDURE — 97530 THERAPEUTIC ACTIVITIES: CPT

## 2021-02-24 PROCEDURE — 85610 PROTHROMBIN TIME: CPT

## 2021-02-24 PROCEDURE — 2580000003 HC RX 258: Performed by: INTERNAL MEDICINE

## 2021-02-24 PROCEDURE — 2580000003 HC RX 258: Performed by: NURSE PRACTITIONER

## 2021-02-24 PROCEDURE — 96376 TX/PRO/DX INJ SAME DRUG ADON: CPT

## 2021-02-24 PROCEDURE — 36415 COLL VENOUS BLD VENIPUNCTURE: CPT

## 2021-02-24 PROCEDURE — 6360000002 HC RX W HCPCS: Performed by: INTERNAL MEDICINE

## 2021-02-24 PROCEDURE — 6370000000 HC RX 637 (ALT 250 FOR IP): Performed by: INTERNAL MEDICINE

## 2021-02-24 RX ADMIN — RISPERIDONE 2 MG: 2 TABLET, FILM COATED ORAL at 22:20

## 2021-02-24 RX ADMIN — CEFTRIAXONE 1000 MG: 1 INJECTION, POWDER, FOR SOLUTION INTRAMUSCULAR; INTRAVENOUS at 18:01

## 2021-02-24 RX ADMIN — DOXYCYCLINE HYCLATE 100 MG: 100 CAPSULE ORAL at 11:23

## 2021-02-24 RX ADMIN — CARBIDOPA AND LEVODOPA 1 TABLET: 25; 100 TABLET ORAL at 22:21

## 2021-02-24 RX ADMIN — DIVALPROEX SODIUM 1000 MG: 500 TABLET, EXTENDED RELEASE ORAL at 22:20

## 2021-02-24 RX ADMIN — CARBIDOPA AND LEVODOPA 1 TABLET: 25; 100 TABLET ORAL at 13:37

## 2021-02-24 RX ADMIN — RIVAROXABAN 15 MG: 15 TABLET, FILM COATED ORAL at 09:38

## 2021-02-24 RX ADMIN — CARBIDOPA AND LEVODOPA 1 TABLET: 25; 100 TABLET ORAL at 09:38

## 2021-02-24 RX ADMIN — SODIUM CHLORIDE: 9 INJECTION, SOLUTION INTRAVENOUS at 16:12

## 2021-02-24 RX ADMIN — CLONAZEPAM 0.5 MG: 0.5 TABLET ORAL at 22:21

## 2021-02-24 RX ADMIN — Medication 10 ML: at 09:39

## 2021-02-24 RX ADMIN — DOXYCYCLINE HYCLATE 100 MG: 100 CAPSULE ORAL at 22:21

## 2021-02-24 ASSESSMENT — PAIN SCALES - GENERAL
PAINLEVEL_OUTOF10: 0

## 2021-02-24 NOTE — PLAN OF CARE
Problem: Falls - Risk of:  Goal: Will remain free from falls  Description: Will remain free from falls  2/24/2021 0244 by Hakeem Desai RN  Outcome: Met This Shift  2/24/2021 0146 by Arnaldo Slaughter RN  Outcome: Met This Shift  2/23/2021 1859 by Shelton Estrada RN  Outcome: Met This Shift  Goal: Absence of physical injury  Description: Absence of physical injury  Outcome: Met This Shift

## 2021-02-24 NOTE — PLAN OF CARE
Problem: Falls - Risk of:  Goal: Will remain free from falls  Description: Will remain free from falls  2/24/2021 0146 by Hillary Isbell RN  Outcome: Met This Shift  2/23/2021 1859 by Isaías Ortiz RN  Outcome: Met This Shift

## 2021-02-24 NOTE — PROGRESS NOTES
down  5. PNA - procal 0.27              Continue rocephin + doxy  6. Parkinson's disease - continue sinemet  7.  DVT px - lovenox      Please call my cell phone between 8pm-6am [phone]    Mell Resides    4:25 PM  2/24/2021

## 2021-02-24 NOTE — CARE COORDINATION
[facility] score 12/24; covid testing pended. will require PAMELA;plan is for [facility] facility per Chyna Esqueda. Hannah Crabtree.

## 2021-02-24 NOTE — PROGRESS NOTES
Called Dermatology consult in for patient to Dr Lyssa Jimenez.   Her office stated that she is out of town until Monday, March 1st.

## 2021-02-24 NOTE — CARE COORDINATION
Social Work / Discharge PLanning : SW discussed therapy am/pac with Anna Velarde at [facility] 12/24. [facility] will NOT be able to accept back at this level and is reaching out to the family in regards to [address] [place] [address] & [facility]. Await response. COVID will need completed. [address] generated and transport forms completed. SW to follow. Electronically signed by Sherolyn Canavan, LSW on 2/24/21 at 11:10 AM EST    Addendum :Anna Velarde from [facility] updated SW that [facility] can accept. COVID negative. Patient has MR dx and Anna Velarde checked  to see if they will accept HENS and they can. AWait pre-cert. SW to follow.  Electronically signed by Sherolyn Canavan, LSW on 2/24/21 at 12:22 PM EST

## 2021-02-24 NOTE — PROGRESS NOTES
Attempted to call out dermatology consults to Dr Faviola Whipple and [facility]. Neither one of those do consults.

## 2021-02-24 NOTE — DISCHARGE INSTR - COC
Continuity of Care Form    Patient Name: Denver Moffett   :  1945  MRN:  [id number]    Admit date:  2021  Discharge date:  21    Code Status Order: Full Code   Advance Directives:   [facility] Documentation       Date/Time Healthcare Directive Type of Healthcare Directive Copy in [address] Agent's Name Healthcare Agent's Phone Number    21 2220  No, patient does not have an advance directive for healthcare treatment -- -- -- -- --            Admitting Physician:  Rigoberto Spatz, MD  PCP: Bozena Kauffman MD    Discharging Nurse: [facility] Unit/Room#: 65/200-A  Discharging Unit Phone Number: [phone]    Emergency Contact:   Extended Emergency Contact Information  Primary Emergency Contact: Kimberly Skyler [address] Phone: [phone]  Mobile Phone: [phone]  Relation: Brother/Sister  Secondary Emergency Contact: Lorraine Elizabeth [address] Phone: [phone]  Mobile Phone: [phone]  Relation: Other    Past Surgical History:  Past Surgical History:   Procedure Laterality Date    HERNIA REPAIR      TONSILLECTOMY         Immunization History: There is no immunization history on file for this patient.     Active Problems:  Patient Active Problem List   Diagnosis Code    Hypotension I95.9    Dementia ([address] F03.90    Mixed hyperlipidemia E78.2    Mental retardation F79    Near syncope R55    Unable to ambulate R26.2    Mild protein-calorie malnutrition ([facility] [address] E44.1       Isolation/Infection:   Isolation            No Isolation          Patient Infection Status       Infection Onset Added Last Indicated Last Indicated By Review Planned Expiration Resolved Resolved By    None active    Resolved    COVID-19 Rule Out 21 COVID-19, Rapid (Ordered)   21 Rule-Out Test Resulted    COVID-19 Rule Out 21 COVID-19, Rapid (Ordered)   02/21/21 Rule-Out Test Resulted    COVID-19 Rule Out 10/23/20 10/24/20 10/23/20 Covid-19 Ambulatory (Ordered)   10/25/20 Rule-Out Test Resulted    COVID-19 Rule Out 10/08/20 10/08/20 10/08/20 COVID-19 (Ordered)   10/08/20 Rule-Out Test Resulted            Nurse Assessment:  Last Vital Signs: /65   Pulse 81   Temp 97.1 °F (36.2 °C) (Axillary)   Resp 18   Ht 5' 6\" (1.676 m)   Wt 110 lb 0.2 oz (49.9 kg)   SpO2 95%   BMI 17.76 kg/m²     Last documented pain score (0-10 scale): Pain Level: 0  Last Weight:   Wt Readings from Last 1 Encounters:   02/23/21 110 lb 0.2 oz (49.9 kg)     Mental Status:  oriented, alert and confusion at times    IV Access:  - None    Nursing Mobility/ADLs:  Walking   Assisted  Transfer  Assisted  Bathing  Dependent  Dressing  Assisted  Toileting  Assisted  Feeding  Assisted  Med Admin  Assisted  Med Delivery   whole    Wound Care Documentation and Therapy:        Elimination:  Continence:   · Bowel: Yes and No  · Bladder: Yes  Urinary Catheter: None   Colostomy/Ileostomy/Ileal Conduit: No       Date of Last BM: 2/25/21    Intake/Output Summary (Last 24 hours) at 2/24/2021 1235  Last data filed at 2/24/2021 0755  Gross per 24 hour   Intake 1650 ml   Output 1200 ml   Net 450 ml     I/O last 3 completed shifts: In: 2076 [P.O.:50; I.V.:1600]  Out: 700 [Urine:700]    Safety Concerns: At Risk for Falls/ bleeding risk    Impairments/Disabilities:      Speech, Vision and Hearing    Nutrition Therapy:  Current Nutrition Therapy:   - Oral Diet:  General    Routes of Feeding: Oral  Liquids: No Restrictions  Daily Fluid Restriction: no  Last Modified Barium Swallow with Video (Video Swallowing Test): not done    Treatments at the Time of Hospital Discharge:   Respiratory Treatments: ***  Oxygen Therapy:  is not on home oxygen therapy.   Ventilator:    - No ventilator support    Rehab Therapies: Physical Therapy, Occupational Therapy and Speech/Language Therapy  Weight Bearing Status/Restrictions: No weight bearing restirctions  Other Medical Equipment (for information only, NOT a DME order):  cane and walker  Other Treatments: ***    Patient's personal belongings (please select all that are sent with patient):  None    RN SIGNATURE:  Electronically signed by Sabina Vergara RN on 2/26/21 at 3:14 PM EST    CASE MANAGEMENT/SOCIAL WORK SECTION    Inpatient Status Date: ***    Readmission Risk Assessment Score:  Readmission Risk              Risk of Unplanned Readmission:        14           Discharging to Facility/ Agency   · Name: [facility]  · [address] [place] [address], [address] [place] [address]  · 35 23 07    Dialysis Facility (if applicable)   · Name:  · Address:  · Dialysis Schedule:  · Phone:  · Fax:    / signature: {Esignature:[id number]} Electronically signed by DOROTA Hilton on 2/24/21 at 12:37 PM EST      PHYSICIAN SECTION    Prognosis: Good    Condition at Discharge: Stable    Rehab Potential (if transferring to Rehab): Good    Recommended Labs or Other Treatments After Discharge: Please continue eliquis 10mg BID x 6days then 5mg BID    Physician Certification: I certify the above information and transfer of Morales Joel  is necessary for the continuing treatment of the diagnosis listed and that she requires [facility] for less 30 days.      Update Admission H&P: No change in H&P    PHYSICIAN SIGNATURE:  Electronically signed by Bakari Saini MD on 2/26/21 at 12:55 PM EST

## 2021-02-24 NOTE — PROGRESS NOTES
Physical Therapy    Facility/Department: [address] MED SURG/TELE  Initial Assessment    NAME: Michael Hinkle  : 1945  MRN: [id number]    Date of Service: 2021      Attending Provider:  Rodrigo Phillip MD    Evaluating PT:  Alejandro Diaz. Lorena Ruffin P.T. Room #:  8469/1992-Q  Diagnosis:  Unable to ambulate, + for RLE DVT, multiple falls at facility PTA. Pertinent PMHx/PSHx:  Parkinson's Disease, Dementia, mild mental retardation  Precautions:  Falls, bed/chair alarm    SUBJECTIVE:    Pt lives at [facility] and ambulated with ww PTA. OBJECTIVE:   Initial Evaluation  Date: 21 Treatment Short Term/ Long Term   Goals   Was pt agreeable to Eval/treatment? yes     Does pt have pain? C/o pain BLE     Bed Mobility  Rolling: MOD A  Supine to sit: MOD A  Sit to supine: MOD A  Scooting: MOD A  SBA   Transfers Sit to stand: MOD A  Stand to sit: MOD A  Stand pivot: NA  SBA/CGA   Ambulation   5 feet forward and backward with ww MIN A/MOD A  150 feet with ww CGA   Stair negotiation: ascended and descended NA  NA   AM-PAC 6 Clicks 56/59     BLE ROM is WFL. BLE strength is grossly 3-/5 to 4-/5. Balance: sitting is SBA/CGA and standing with ww is MIN A  Endurance: fair-    Patient education  Pt educated on bed mobility and hand placement for transfers. Patient response to education:   Pt verbalized understanding Pt demonstrated skill Pt requires further education in this area   yes Yes with VCs and assist yes     ASSESSMENT:    Comments:  Pt was found in bed with resting tremors and has a delay when answering questions and sometimes does not answer them. Pt was able to follow directions and participate with PT. She sat EOB and had no c/o dizziness or light headedness. She walked forward 5 feet and reported she could not go any farther and walked backwards to her bed. Pt was more unsteady walking backwards with ww and required VCs to slow down as to not lose control or balance which could cause a fall.   Pt was assisted back into bed. Pt was left supine in bed with B heel protectors in place with call light left by patient. Chair/bed alarm: bed alarm was re-activated. Pt's/ family goals   1. Pt did not participate in goal setting at this time. Patient and or family understand(s) diagnosis, prognosis, and plan of care. PLAN OF CARE:    Current Treatment Recommendations      [x] Strengthening     [] ROM   [x] Balance Training   [x] Endurance Training   [x] Transfer Training   [x] Gait Training   [] Stair Training   [] Positioning   [x] Safety and Education Training   [] Patient/Caregiver Education   [] HEP  [] Other     PT care will be provided in accordance with the objectives noted above. Exercises and functional mobility practice will be used as well as appropriate assistive devices or modalities to obtain goals. Patient and family education will also be administered as needed. Frequency of treatments: 2-5x/week x 1-2 weeks. Time in  07:40  Time out  07:55    Evaluation Time includes thorough review of current medical information, gathering information on past medical history/social history and prior level of function, completion of standardized testing/informal observation of tasks, assessment of data and education on plan of care and goals. CPT codes:  [x] Low Complexity PT evaluation 22672  [] Moderate Complexity PT evaluation 87862  [] High Complexity PT evaluation 33030  [] PT Re-evaluation 33024  [] Gait training 56563 ** minutes  [] Manual therapy 11229 ** minutes  [] Therapeutic activities 85815 ** minutes  [] Therapeutic exercises 90837 ** minutes  [] Neuromuscular reeducation 46372 ** minutes     Sy Nesbitt., P.T.   License Number: [id number]

## 2021-02-24 NOTE — PROGRESS NOTES
Occupational Therapy  OT BEDSIDE TREATMENT NOTE      Date:2021  Patient Name: Jonah Hernandez  MRN: [id number]  : 1945  Room: 30[facility]     Referring Provider: Deniz Alejandro MD  Evaluating OT: Declan Rodriguez. Junior, OTR/L - [id number]     AM-PAC Daily Activity Raw Score:      Recommended Adaptive Equipment: Continue to assess.      Diagnosis: Unable to ambulate [R26.2]               Patient presented to hospital s/p fall at [facility]; patient found to have R LE DVT. Pertinent Medical History: dementia, Parkinson's disease, anxiety, osteoporosis, depression, arthritis     Precautions: falls     Home Living: Patient reported that she is a resident of [facility] [employer] [facility]. Equipment Owned: walker     Prior Level of Function (PLOF): Patient reported that [facility] staff members assist her with completion of ADLs, as needed. Patient stated that she had used a walker for functional mobility at the [facility]. Patient is a questionable historian; no family/caregiver present to verify information gathered.     Pain Level: Pt did not report pain. Cognition: Pt awake and agreeable to therapy. Initial limited eye opening however increased alertness with movement. Pt non verbal throughout session. Able to follow directions but no verbal response to questions/directions.      Functional Assessment:    Initial Eval Status  Date: 2021 Treatment Status    Short Term Goals   Feeding SBA   Setup   Grooming Mod A  max  A Setup  (seated)   UB Dressing Mod A Mod A to change gown when seated on the side of the bed.   SBA   LB Dressing Max A  max A doff soiled brief and don clean brief. Min A - with use of AE, as needed/appropriate   Bathing Max A   Min A - with use of AE/DME, as needed/appropriate   Toileting Max A Max A . Pt incontinent of urine when standing.    Min A   Bed Mobility  Supine-to-Sit: Max A  Sit-to-Supine: Max A  Log Rolling: Mod A for adjustment of bed linens.   Mod A supine to sit       Functional Transfers Sit-to-Stand: Mod A   from EOB  Posterior lean demonstrated. Min A transfer from bed. Pt impulsively attempting to stand several times during ADL activity.   CGA   Functional Mobility Patient unable to take side steps toward [facility] secondary to posterior lean. Mod A pivot to the chair.   CGA with functional mobility (with device, as needed/appropriate) in order to maximize independence with ADLs and other functional tasks. Balance Sitting: Fair  (at EOB)  Standing: Poor  (with walker) Min A stand balance during ADL activity.   Fair dynamic standing balance during completion of ADLs and other functional tasks. Activity Tolerance Limited secondary to dizziness. Patient tolerated ~8 minutes of EOB sitting. Fair -   Patient will demonstrate Good understanding and consistent implementation of energy conservation techniques and work simplification techniques into ADL routines. B UE Strength Proximally: 3-/5  Distally: 3+/5   Patient will demonstrate 4-/5 distal B UE strength in order to maximize independence with ADLs and functional transfers. Comments:  Pt assisted to recliner chair after completion of ADL activity. Impulsive and poor safety awareness. Remained in chair with alarm activated. Nursing present at end of the session. Education/treatment:  ADL retraining with facilitation of movement to increase self care. Therapeutic activity to address balance, strength, and endurance for ADL and transfers. Pt education of transfer safety and daily orientation. · Pt has made  progress towards set goals. Plan of Care: 2-5 days/week for 1-2 weeks PRN  [x]? ?ADL retraining/adaptive techniques and AE recommendations to increase functional independence within precautions  [x]? ? Energy conservation techniques to improve tolerance for ADLs/IADLs  [x]? ? Functional transfer/mobility training/DME recommendations for increased independence, safety and fall prevention  [x]? ?Patient/family education to increase safety and functional independence  [x]? ? Environmental modifications for safe mobility and completion of ADLs/IADLs  []? ?Cognitive retraining exercises to improve problem solving skills & safe participation in ADLs/IADLs   []? ?Sensory re-education techniques to improve extremity awareness, maintain skin integrity and improve hand function   []? ? Visual/Perceptual retraining  to improve body awareness and safety during transfers and ADLs   []? ? Splinting/positioning needs to maintain joint/skin integrity and prevent contractures   [x]? ? Therapeutic activity to improve functional performance during ADLs/IADLs  [x]? ? Therapeutic exercise to improve tolerance and functional strength for ADLs/IADLs  [x]? ? Balance retraining/tolerance tasks for facilitation of postural control with dynamic challenges during ADLs   [x]? ? Neuromuscular re-education: facilitate righting/equilibrium reactions, midline orientation, scapular stability/mobility, normalize muscle tone, and facilitate active functional movement/attention  []? ? Delirium prevention/treatment   []? Positioning to improve functional independence and prevent skin breakdown   []? ?Other:        Time In: 9:15  Time Out: 9:40      Min Units   Therapeutic Ex 37321     Therapeutic Activities 24123 1 4   ADL/Self Care 46073 17 1   Orthotic Management 60405     Neuro Re-Ed 56985     Non-Billable Time     TOTAL TIMED TREATMENT [address] [facility] [address]

## 2021-02-25 LAB — METER GLUCOSE: 86 MG/DL (ref 74–99)

## 2021-02-25 PROCEDURE — 2580000003 HC RX 258: Performed by: NURSE PRACTITIONER

## 2021-02-25 PROCEDURE — 1200000000 HC SEMI PRIVATE

## 2021-02-25 PROCEDURE — 6360000002 HC RX W HCPCS: Performed by: INTERNAL MEDICINE

## 2021-02-25 PROCEDURE — 2580000003 HC RX 258: Performed by: INTERNAL MEDICINE

## 2021-02-25 PROCEDURE — 97530 THERAPEUTIC ACTIVITIES: CPT

## 2021-02-25 PROCEDURE — G0378 HOSPITAL OBSERVATION PER HR: HCPCS

## 2021-02-25 PROCEDURE — 96376 TX/PRO/DX INJ SAME DRUG ADON: CPT

## 2021-02-25 PROCEDURE — 82962 GLUCOSE BLOOD TEST: CPT

## 2021-02-25 PROCEDURE — 6370000000 HC RX 637 (ALT 250 FOR IP): Performed by: INTERNAL MEDICINE

## 2021-02-25 PROCEDURE — 97535 SELF CARE MNGMENT TRAINING: CPT

## 2021-02-25 PROCEDURE — 6370000000 HC RX 637 (ALT 250 FOR IP): Performed by: NURSE PRACTITIONER

## 2021-02-25 RX ADMIN — RISPERIDONE 2 MG: 2 TABLET, FILM COATED ORAL at 22:11

## 2021-02-25 RX ADMIN — CLONAZEPAM 0.5 MG: 0.5 TABLET ORAL at 22:11

## 2021-02-25 RX ADMIN — DIVALPROEX SODIUM 1000 MG: 500 TABLET, EXTENDED RELEASE ORAL at 22:11

## 2021-02-25 RX ADMIN — CARBIDOPA AND LEVODOPA 1 TABLET: 25; 100 TABLET ORAL at 14:13

## 2021-02-25 RX ADMIN — CARBIDOPA AND LEVODOPA 1 TABLET: 25; 100 TABLET ORAL at 22:11

## 2021-02-25 RX ADMIN — DOXYCYCLINE HYCLATE 100 MG: 100 CAPSULE ORAL at 11:09

## 2021-02-25 RX ADMIN — CARBIDOPA AND LEVODOPA 1 TABLET: 25; 100 TABLET ORAL at 09:04

## 2021-02-25 RX ADMIN — SODIUM CHLORIDE: 9 INJECTION, SOLUTION INTRAVENOUS at 03:26

## 2021-02-25 RX ADMIN — SODIUM CHLORIDE: 9 INJECTION, SOLUTION INTRAVENOUS at 16:34

## 2021-02-25 RX ADMIN — DOXYCYCLINE HYCLATE 100 MG: 100 CAPSULE ORAL at 22:11

## 2021-02-25 RX ADMIN — CEFTRIAXONE 1000 MG: 1 INJECTION, POWDER, FOR SOLUTION INTRAMUSCULAR; INTRAVENOUS at 19:12

## 2021-02-25 RX ADMIN — ACETAMINOPHEN 650 MG: 325 TABLET ORAL at 11:18

## 2021-02-25 ASSESSMENT — PAIN DESCRIPTION - PAIN TYPE: TYPE: ACUTE PAIN

## 2021-02-25 ASSESSMENT — PAIN SCALES - GENERAL
PAINLEVEL_OUTOF10: 0
PAINLEVEL_OUTOF10: 3
PAINLEVEL_OUTOF10: 0
PAINLEVEL_OUTOF10: 0

## 2021-02-25 ASSESSMENT — PAIN DESCRIPTION - LOCATION: LOCATION: HEAD

## 2021-02-25 ASSESSMENT — PAIN SCALES - PAIN ASSESSMENT IN ADVANCED DEMENTIA (PAINAD)
TOTALSCORE: 0
TOTALSCORE: 0
NEGVOCALIZATION: 0
FACIALEXPRESSION: 0
FACIALEXPRESSION: 0
BODYLANGUAGE: 0
BREATHING: 0
CONSOLABILITY: 0
BREATHING: 0
BODYLANGUAGE: 0
NEGVOCALIZATION: 0
CONSOLABILITY: 0

## 2021-02-25 ASSESSMENT — PAIN DESCRIPTION - FREQUENCY: FREQUENCY: INTERMITTENT

## 2021-02-25 ASSESSMENT — PAIN DESCRIPTION - PROGRESSION: CLINICAL_PROGRESSION: NOT CHANGED

## 2021-02-25 ASSESSMENT — PAIN DESCRIPTION - DESCRIPTORS: DESCRIPTORS: ACHING;DISCOMFORT;SORE

## 2021-02-25 ASSESSMENT — PAIN - FUNCTIONAL ASSESSMENT: PAIN_FUNCTIONAL_ASSESSMENT: PREVENTS OR INTERFERES SOME ACTIVE ACTIVITIES AND ADLS

## 2021-02-25 ASSESSMENT — PAIN DESCRIPTION - ONSET: ONSET: ON-GOING

## 2021-02-25 NOTE — CONSULTS
Department of Medicine  Division of Hematology/Oncology  Attending Consult Note      Reason for Consult:  Acute DVT and Petechiae  Requesting Physician: Odin Donnelly    CHIEF COMPLAINT:  Fall    History Obtained From:  Patient and EMR    HISTORY OF PRESENT ILLNESS:      The patient is a 70 y.o. female with significant past medical history of asthma, Dementia, Hyperlipidemia, mild mental retardation, parkinson disease who presents with 4 falls at her assisted living facility. Patient admitted to feeling lightheaded prior to the falls. She was recently treated with Bactrim for UTI. Labs on admission show WBC 5.9, hemoglobin 10.5, hematocrit 32.8, platelets 99, CK 2,555. Ultrasound of the lower extremities showed a thrombus identified in the peroneal vein of the right lower extremity. CT the head was limited due to artifact, no skull fracture or acute intracranial abnormality is seen. CT the cervical spine was negative for any fractures. CXR showed bilateral pulmonary interstitial infiltrates new since prior exam.  X-ray of the left shoulder was no acute fractures or dislocations. UA shows trace of LE small blood, culture was sent and started on Rocephin on 2/22. Patient was started on Eliquis on 2/22 and then developed a lower extremity pruritic in nature and upper chest rash. Eliquis was discontinued and Xarelto was started. Xarelto was then discontinued on 2/24 and she was started on therapeutic dose of Lovenox. Dermatology was consulted for pruritic rash. Patient is a poor historian due to history of dementia and Parkinson's disease. Therefore limited ROS.     Past Medical History:        Diagnosis Date    Anxiety     Arthritis     Asthma     Dementia ([facility]     Depression     Depression     Hyperlipidemia     Mild mental retardation     Osteoporosis     Parkinson disease ([facility]        Past Surgical History:        Procedure Laterality Date    HERNIA REPAIR      TONSILLECTOMY         Current Medications:    Current Facility-Administered Medications: doxycycline hyclate (VIBRAMYCIN) capsule 100 mg, 100 mg, Oral, 2 times per day  cefTRIAXone (ROCEPHIN) 1,000 mg in sterile water 10 mL IV syringe, 1,000 mg, Intravenous, Q24H  sodium chloride flush 0.9 % injection 10 mL, 10 mL, Intravenous, 2 times per day  sodium chloride flush 0.9 % injection 10 mL, 10 mL, Intravenous, PRN  promethazine (PHENERGAN) tablet 12.5 mg, 12.5 mg, Oral, Q6H PRN **OR** ondansetron (ZOFRAN) injection 4 mg, 4 mg, Intravenous, Q6H PRN  polyethylene glycol (GLYCOLAX) packet 17 g, 17 g, Oral, Daily PRN  acetaminophen (TYLENOL) tablet 650 mg, 650 mg, Oral, Q6H PRN **OR** acetaminophen (TYLENOL) suppository 650 mg, 650 mg, Rectal, Q6H PRN  carbidopa-levodopa (SINEMET)  MG per tablet 1 tablet, 1 tablet, Oral, TID  divalproex (DEPAKOTE ER) extended release tablet 1,000 mg, 1,000 mg, Oral, Nightly  risperiDONE (RISPERDAL) tablet 2 mg, 2 mg, Oral, Nightly  clonazePAM (KLONOPIN) tablet 0.5 mg, 0.5 mg, Oral, Nightly  0.9 % sodium chloride infusion, , Intravenous, Continuous    Allergies:  Pcn [penicillins]    Social History:   Social History     Socioeconomic History    Marital status: Single     Spouse name: Not on file    Number of children: Not on file    Years of education: Not on file    Highest education level: Not on file   Occupational History    Not on file   Social Needs    Financial resource strain: Not on file    Food insecurity     Worry: Not on file     Inability: Not on file    Transportation needs     Medical: Not on file     Non-medical: Not on file   Tobacco Use    Smoking status: Never Smoker    Smokeless tobacco: Never Used   Substance and Sexual Activity    Alcohol use: No    Drug use: No    Sexual activity: Never   Lifestyle    Physical activity     Days per week: Not on file     Minutes per session: Not on file    Stress: Not on file   Relationships    Social connections     Talks on phone: Not on file     Gets together: Not on file     Attends [employer] service: Not on file     Active member of club or organization: Not on file     Attends meetings of clubs or organizations: Not on file     Relationship status: Not on file    Intimate partner violence     Fear of current or ex partner: Not on file     Emotionally abused: Not on file     Physically abused: Not on file     Forced sexual activity: Not on file   Other Topics Concern    Not on file   Social History Narrative    Not on file       Family History:         Family history unknown: Yes       REVIEW OF SYSTEMS:    As per HPI otherwise 14 point ROS is negative. PHYSICAL EXAM:      Vitals:  BP (!) 116/59   Pulse 80   Temp 98.3 °F (36.8 °C) (Axillary)   Resp 18   Ht 5' 6\" (1.676 m)   Wt 110 lb 0.2 oz (49.9 kg)   SpO2 95%   BMI 17.76 kg/m²     CONSTITUTIONAL:  awake, cooperative, no apparent distress, and appears stated age cachectic,   HEENT: no abnormality. Lids and lashes normal, PERRLA, EOMI, sclera clear, conjunctiva normal  NECK:  Supple, full ROM, symmetrical, trachea midline, no adenopathy, thyroid symmetric, not enlarged and no tenderness, skin normal  HEMATOLOGIC/LYMPHATICS:  no cervical or supraclavicular lymphadenopathy  LUNGS:  No increased work of breathing, good air exchange, clear to auscultation bilaterally, no crackles or wheezing  CARDIOVASCULAR:  Normal apical impulse, regular rate and rhythm, normal S1 and S2, no S3 or S4, and no murmur noted  ABDOMEN:  No scars, normal BS, soft, non-distended, non-tender, no masses palpated, no hepatosplenomegaly  MUSCULOSKELETAL:  No redness, warmth, or swelling of the joints; tone is normal  NEUROLOGIC:  Awake, alert, oriented to name, place and time. Cranial nerves II-XII are grossly intact.     SKIM: BLE rash involving knees predominantly    DATA:      CMP:    Lab Results   Component Value Date     02/24/2021    K 4.1 02/24/2021    K 3.8 02/22/2021     02/24/2021    CO2 20 02/24/2021    BUN 22 02/24/2021    PROT 5.1 02/21/2021       CBC:    Recent Labs     02/23/21  0220 02/24/21  0420 02/24/21  1800   WBC 7.7 7.6 7.7   HGB 10.8* 10.1* 10.8*   PLT 93* 120* 150       Radiology:    Ct Head Wo Contrast  Result Date: 2/21/2021  EXAMINATION: CT OF THE HEAD WITHOUT CONTRAST; CT OF THE CERVICAL SPINE WITHOUT CONTRAST 2/21/2021 2:26 pm TECHNIQUE: CT of the head was performed without the administration of intravenous contrast. Dose modulation, iterative reconstruction, and/or weight based adjustment of the mA/kV was utilized to reduce the radiation dose to as low as reasonably achievable.; CT of the cervical spine was performed without the administration of intravenous contrast. Multiplanar reformatted images are provided for review. Dose modulation, iterative reconstruction, and/or weight based adjustment of the mA/kV was utilized to reduce the radiation dose to as low as reasonably achievable. COMPARISON: CT of the head, 02/22/2019. HISTORY: ORDERING SYSTEM PROVIDED HISTORY: multiple falls, head injury TECHNOLOGIST PROVIDED HISTORY: Has a \"code stroke\" or \"stroke alert\" been called? ->No Reason for exam:->multiple falls, head injury FINDINGS: Evaluation is somewhat limited due to patient motion artifact. CT OF THE BRAIN: BRAIN/VENTRICLES: No mass effect, edema or hemorrhage is seen. Mild-to-moderate volume loss is seen in the brain with mild chronic microvascular ischemic changes. No hydrocephalus or extra-axial fluid is seen. ORBITS: The visualized portion of the orbits demonstrate no acute abnormality. SINUSES: The visualized paranasal sinuses and mastoid air cells demonstrate no acute abnormality. SOFT TISSUES/SKULL: No acute abnormality of the visualized skull or soft tissues. CT CERVICAL SPINE: BONES/ALIGNMENT: There is no acute fracture or traumatic malalignment. DEGENERATIVE CHANGES: Loss of disc heights throughout the cervical spine, especially prominent at C5-61, C6-7 and C7-T1. Small disc protrusion at C3-4 results in at least mild central canal stenosis. Severe neural foraminal stenoses are noted at multiple levels. SOFT TISSUES: There is no prevertebral soft tissue swelling. CT head without contrast: 1. Limited study due to prominent patient motion artifact. 2. Within this limitation, no skull fracture or acute intracranial abnormality is seen. . CT cervical spine without contrast: 1. No fracture or joint dislocation is seen. 2. Degenerative changes, as described. Ct Cervical Spine Wo Contrast  Result Date: 2/21/2021  EXAMINATION: CT OF THE HEAD WITHOUT CONTRAST; CT OF THE CERVICAL SPINE WITHOUT CONTRAST 2/21/2021 2:26 pm TECHNIQUE: CT of the head was performed without the administration of intravenous contrast. Dose modulation, iterative reconstruction, and/or weight based adjustment of the mA/kV was utilized to reduce the radiation dose to as low as reasonably achievable.; CT of the cervical spine was performed without the administration of intravenous contrast. Multiplanar reformatted images are provided for review. Dose modulation, iterative reconstruction, and/or weight based adjustment of the mA/kV was utilized to reduce the radiation dose to as low as reasonably achievable. COMPARISON: CT of the head, 02/22/2019. HISTORY: ORDERING SYSTEM PROVIDED HISTORY: multiple falls, head injury TECHNOLOGIST PROVIDED HISTORY: Has a \"code stroke\" or \"stroke alert\" been called? ->No Reason for exam:->multiple falls, head injury FINDINGS: Evaluation is somewhat limited due to patient motion artifact. CT OF THE BRAIN: BRAIN/VENTRICLES: No mass effect, edema or hemorrhage is seen. Mild-to-moderate volume loss is seen in the brain with mild chronic microvascular ischemic changes. No hydrocephalus or extra-axial fluid is seen. ORBITS: The visualized portion of the orbits demonstrate no acute abnormality.  SINUSES: The visualized paranasal sinuses and mastoid air cells demonstrate no acute abnormality. SOFT TISSUES/SKULL: No acute abnormality of the visualized skull or soft tissues. CT CERVICAL SPINE: BONES/ALIGNMENT: There is no acute fracture or traumatic malalignment. DEGENERATIVE CHANGES: Loss of disc heights throughout the cervical spine, especially prominent at C5-61, C6-7 and C7-T1. Small disc protrusion at C3-4 results in at least mild central canal stenosis. Severe neural foraminal stenoses are noted at multiple levels. SOFT TISSUES: There is no prevertebral soft tissue swelling. CT head without contrast: 1. Limited study due to prominent patient motion artifact. 2. Within this limitation, no skull fracture or acute intracranial abnormality is seen. . CT cervical spine without contrast: 1. No fracture or joint dislocation is seen. 2. Degenerative changes, as described. Xr Shoulder Left (min 2 Views)  Result Date: 2/21/2021  EXAMINATION: THREE XRAY VIEWS OF THE LEFT SHOULDER 2/21/2021 2:37 pm COMPARISON: None. HISTORY: ORDERING SYSTEM PROVIDED HISTORY: fall, pain TECHNOLOGIST PROVIDED HISTORY: Reason for exam:->fall, pain FINDINGS: Glenohumeral joint is normally aligned. No evidence of acute fracture or dislocation. No abnormal periarticular calcifications. The [facility] joint is unremarkable in appearance. Visualized lung is unremarkable. No acute fractures or dislocations in left shoulder. Xr Chest Portable  Result Date: 2/21/2021  EXAMINATION: ONE XRAY VIEW OF THE CHEST 2/21/2021 2:37 pm COMPARISON: Comparison is dated October 8, 2020. HISTORY: ORDERING SYSTEM PROVIDED HISTORY: altered mental status TECHNOLOGIST PROVIDED HISTORY: Reason for exam:->altered mental status FINDINGS: Cardiac and mediastinal contours are unremarkable. There are bilateral pulmonary diffuse interstitial opacities new since the prior exam more prominent on the right. No pleural effusion seen.   Bilateral pulmonary interstitial infiltrates new since the prior exam. Appearance is nonspecific could reflect edema or other etiologies    Us Dup Lower Extremities Bilateral Venous  Result Date: 2/22/2021  EXAMINATION: DUPLEX VENOUS ULTRASOUND OF THE BILATERAL LOWER EXTREMITIES, 2/22/2021 9:30 am TECHNIQUE: Duplex ultrasound and Doppler images were obtained of the bilateral lower extremities COMPARISON: None. HISTORY: ORDERING SYSTEM PROVIDED HISTORY: possible dvt TECHNOLOGIST PROVIDED HISTORY: Reason for exam:->possible dvt What reading provider will be dictating this exam?->CRC FINDINGS: Thrombus identified in the right coronal vein below the knee. The remaining visualized veins of the bilateral lower extremities are patent and free of echogenic thrombus. Thrombus identified in the peroneal vein of the right lower extremity. IMPRESSION:     68-year-old female who presented for multiple falls at her living facility was found to have a right lower extremity DVT and thrombocytopenia. We were subsequently consulted for acute DVT and petechiae. Labs 2/24 show Platelets 462. RECOMMENDATIONS:  Patient presented with multiple traumatic falls, obtaining now a provoked RLE DVT. The bruises/rash involving BLE could just be consequence of such falls. Doubt related to oral anticaogulant. Could be related to prior antibiotic exposure (Bactrim/cephalosporins). Dermatology consult would be appreciated. Okay for anticoagulation as long as platelets are >14,650  She was started on Eliquis,complained of new rash, this was continued and started on Xarleto. Continue Lovenox for now but she can be switched back to [place] falls or Eliquis from my perspective. Attending Addendum:      Patient seen and examined personally. All pertinent labs and imaging reviewed. Case discussed with STELLA Capone. Agree with the consult note as above which has been updated to reflect my changes.       Electronically signed by Amanda Thao MD on 2/25/2021 at 6:01 PM

## 2021-02-25 NOTE — PROGRESS NOTES
Call placed to Dr. Urban Ser answering service 5:11 PM    Spoke with Dr. Sujey Staples. He only covers his patients in the hospital. He is not taking new consults here.  Perfect serve message sent to Dr. Apolinar Castillo to update him .5:32 PM

## 2021-02-25 NOTE — PLAN OF CARE
Problem: Falls - Risk of:  Goal: Will remain free from falls  Description: Will remain free from falls  Outcome: Met This Shift     Problem: Skin Integrity:  Goal: Will show no infection signs and symptoms  Description: Will show no infection signs and symptoms  Outcome: Met This Shift     Problem: Skin Integrity:  Goal: Absence of new skin breakdown  Description: Absence of new skin breakdown  Outcome: Met This Shift

## 2021-02-25 NOTE — PROGRESS NOTES
Subjective:    Patient seen and examined at bedside, denies pruritus today. Objective:    BP (!) 116/59   Pulse 80   Temp 98.3 °F (36.8 °C) (Axillary)   Resp 18   Ht 5' 6\" (1.676 m)   Wt 110 lb 0.2 oz (49.9 kg)   SpO2 95%   BMI 17.76 kg/m²     Current medications that patient is taking have been reviewed. GEN: NAD AAOx1  Heart:  RRR, no murmurs, gallops, or rubs. Lungs:  CTA bilaterally, no wheeze, rales or rhonchi  Abd: bowel sounds present, soft, nontender, nondistended, no masses  Extrem: Bilateral rash knees posterior thigh and back    CBC:   Lab Results   Component Value Date    WBC 7.7 02/24/2021    RBC 3.12 02/24/2021    HGB 10.8 02/24/2021    HCT 32.1 02/24/2021    .9 02/24/2021    MCH 34.6 02/24/2021    MCHC 33.6 02/24/2021    RDW 14.2 02/24/2021     02/24/2021    MPV 10.6 02/24/2021     BMP:    Lab Results   Component Value Date     02/24/2021    K 4.1 02/24/2021    K 3.8 02/22/2021     02/24/2021    CO2 20 02/24/2021    BUN 22 02/24/2021    LABALBU 3.0 02/21/2021    CREATININE 0.5 02/24/2021    CALCIUM 8.1 02/24/2021    GFRAA >60 02/24/2021    LABGLOM >60 02/24/2021    GLUCOSE 90 02/24/2021        Assessment:    Patient Active Problem List   Diagnosis    Hypotension    Dementia ([address]    Mixed hyperlipidemia    Mental retardation    Near syncope    Unable to ambulate    Mild protein-calorie malnutrition ([address]       Plan:    1. Weakness/falls              Likely secondary to UTI/DVT              PT/OT for SNF on discharge              TSH, vit D, B12 WNL  2. UTI              UA shows trace LE small blood              Urine culture 10-100k mixed regine  3. RLE DVT              Worsening pruritic rash. Looks like petechiae though pruritic. Consult dermatology. Discontinue anticoagulation. Consult hematology  4. Rhabdomyolysis              Secondary to fall, continue with IVF              CK trending down  5.  PNA - procal 0.27              Continue rocephin + doxy  6. Parkinson's disease - continue sinemet  7.  DVT px - lovenox    Please call my cell phone between 8pm-6am [phone]    5:14 PM  2/25/2021

## 2021-02-26 VITALS
SYSTOLIC BLOOD PRESSURE: 116 MMHG | WEIGHT: 110.01 LBS | HEIGHT: 66 IN | TEMPERATURE: 97.4 F | DIASTOLIC BLOOD PRESSURE: 69 MMHG | RESPIRATION RATE: 18 BRPM | BODY MASS INDEX: 17.68 KG/M2 | HEART RATE: 87 BPM | OXYGEN SATURATION: 97 %

## 2021-02-26 LAB
BLOOD CULTURE, ROUTINE: NORMAL
CULTURE, BLOOD 2: NORMAL

## 2021-02-26 PROCEDURE — 2580000003 HC RX 258: Performed by: NURSE PRACTITIONER

## 2021-02-26 PROCEDURE — G0378 HOSPITAL OBSERVATION PER HR: HCPCS

## 2021-02-26 PROCEDURE — 6370000000 HC RX 637 (ALT 250 FOR IP): Performed by: NURSE PRACTITIONER

## 2021-02-26 PROCEDURE — 6370000000 HC RX 637 (ALT 250 FOR IP): Performed by: INTERNAL MEDICINE

## 2021-02-26 PROCEDURE — 97535 SELF CARE MNGMENT TRAINING: CPT

## 2021-02-26 PROCEDURE — 97530 THERAPEUTIC ACTIVITIES: CPT

## 2021-02-26 RX ORDER — DOXYCYCLINE HYCLATE 100 MG/1
100 CAPSULE ORAL EVERY 12 HOURS SCHEDULED
Qty: 14 CAPSULE | Refills: 0 | Status: ON HOLD
Start: 2021-02-26 | End: 2021-02-28

## 2021-02-26 RX ADMIN — CARBIDOPA AND LEVODOPA 1 TABLET: 25; 100 TABLET ORAL at 08:44

## 2021-02-26 RX ADMIN — SODIUM CHLORIDE: 9 INJECTION, SOLUTION INTRAVENOUS at 04:18

## 2021-02-26 RX ADMIN — DOXYCYCLINE HYCLATE 100 MG: 100 CAPSULE ORAL at 10:37

## 2021-02-26 RX ADMIN — ACETAMINOPHEN 650 MG: 325 TABLET ORAL at 08:44

## 2021-02-26 RX ADMIN — APIXABAN 10 MG: 5 TABLET, FILM COATED ORAL at 10:24

## 2021-02-26 RX ADMIN — ACETAMINOPHEN 650 MG: 325 TABLET ORAL at 13:50

## 2021-02-26 RX ADMIN — CARBIDOPA AND LEVODOPA 1 TABLET: 25; 100 TABLET ORAL at 13:50

## 2021-02-26 ASSESSMENT — PAIN DESCRIPTION - PAIN TYPE: TYPE: ACUTE PAIN

## 2021-02-26 ASSESSMENT — PAIN SCALES - PAIN ASSESSMENT IN ADVANCED DEMENTIA (PAINAD)
BODYLANGUAGE: 0
BREATHING: 0
FACIALEXPRESSION: 0
NEGVOCALIZATION: 0

## 2021-02-26 ASSESSMENT — PAIN SCALES - GENERAL
PAINLEVEL_OUTOF10: 8
PAINLEVEL_OUTOF10: 8

## 2021-02-26 ASSESSMENT — PAIN DESCRIPTION - FREQUENCY: FREQUENCY: INTERMITTENT

## 2021-02-26 ASSESSMENT — PAIN - FUNCTIONAL ASSESSMENT: PAIN_FUNCTIONAL_ASSESSMENT: ACTIVITIES ARE NOT PREVENTED

## 2021-02-26 ASSESSMENT — PAIN DESCRIPTION - DESCRIPTORS: DESCRIPTORS: HEADACHE

## 2021-02-26 ASSESSMENT — PAIN DESCRIPTION - PROGRESSION: CLINICAL_PROGRESSION: GRADUALLY WORSENING

## 2021-02-26 ASSESSMENT — PAIN DESCRIPTION - LOCATION: LOCATION: HEAD

## 2021-02-26 ASSESSMENT — PAIN DESCRIPTION - ORIENTATION: ORIENTATION: POSTERIOR

## 2021-02-26 NOTE — PROGRESS NOTES
Occupational Therapy  OT BEDSIDE TREATMENT NOTE      Date:2021  Patient Name: Shreya Horan  MRN: [id number]  : 1945  Room: [address] [place], [place] [address]A     Referring Provider: Tom Sol MD  Evaluating OT: Jaron Pacheco OTR/L - [id number]     AM-PAC Daily Activity Raw Score: 13/24     Recommended Adaptive Equipment: Continue to assess.      Diagnosis: Unable to ambulate [R26.2]               Patient presented to hospital s/p fall at [facility]; patient found to have R LE DVT. Pertinent Medical History: dementia, Parkinson's disease, anxiety, osteoporosis, depression, arthritis     Precautions: falls     Home Living: Patient reported that she is a resident of [facility]. Equipment Owned: walker     Prior Level of Function (PLOF): Patient reported that [facility] staff members assist her with completion of ADLs, as needed. Patient stated that she had used a walker for functional mobility at the [facility]. Patient is a questionable historian; no family/caregiver present to verify information gathered.     Pain Level: Pt did not report pain. Cognition: Pt awake and agreeable to therapy. Pt with minimal verbalization throughout the session. Able to follow directions.      Functional Assessment:    Initial Eval Status  Date: 2021 Treatment Status    Short Term Goals   Feeding SBA   Setup   Grooming Mod A Washed face after setup. Setup  (seated)   UB Dressing Mod A Min A to doff wet gown and don clean gown.   SBA   LB Dressing Max A  max A don clean brief. Max A to arrange over hips when standing. Posterior lean during static standing. Mod  A for balance. Min A - with use of AE, as needed/appropriate   Bathing Max A   Min A - with use of AE/DME, as needed/appropriate   Toileting Max A Max A toilet hygiene. Incontinent in the bed.    Min A   Bed Mobility  Supine-to-Sit: Max A  Sit-to-Supine: Max A  Log Rolling: Mod A for adjustment of bed linens. Min A supine to sit       Functional Transfers Sit-to-Stand:  Mod A from EOB  Posterior lean demonstrated. Mod A transfer from bed. CGA   Functional Mobility Patient unable to take side steps toward HOB secondary to posterior lean. Mod A pivot to the chair.   CGA with functional mobility (with device, as needed/appropriate) in order to maximize independence with ADLs and other functional tasks. Balance Sitting: Fair  (at EOB)  Standing: Poor  (with walker) Mod A stand balance during ADL activity.   Fair dynamic standing balance during completion of ADLs and other functional tasks. Activity Tolerance Limited secondary to dizziness. Patient tolerated ~8 minutes of EOB sitting. Fair -   Patient will demonstrate Good understanding and consistent implementation of energy conservation techniques and work simplification techniques into ADL routines. B UE Strength Proximally: 3-/5  Distally: 3+/5   Patient will demonstrate 4-/5 distal B UE strength in order to maximize independence with ADLs and functional transfers. Comments:  Pt assisted to recliner chair after completion of ADL activity. Remained in chair with alarm activated. Education/treatment:  ADL retraining with facilitation of movement to increase self care. Therapeutic activity to address balance, strength, and endurance for ADL and transfers. Pt education of transfer safety and daily orientation. · Pt has made  progress towards set goals. Plan of Care: 2-5 days/week for 1-2 weeks PRN  [x]? ?ADL retraining/adaptive techniques and AE recommendations to increase functional independence within precautions  [x]? ? Energy conservation techniques to improve tolerance for ADLs/IADLs  [x]? ? Functional transfer/mobility training/DME recommendations for increased independence, safety and fall prevention  [x]? ?Patient/family education to increase safety and functional independence  [x]? ? Environmental modifications for safe mobility and completion of ADLs/IADLs  []? ?Cognitive retraining exercises to improve problem solving skills & safe participation in ADLs/IADLs   []? ?Sensory re-education techniques to improve extremity awareness, maintain skin integrity and improve hand function   []? ? Visual/Perceptual retraining  to improve body awareness and safety during transfers and ADLs   []? ? Splinting/positioning needs to maintain joint/skin integrity and prevent contractures   [x]? ? Therapeutic activity to improve functional performance during ADLs/IADLs  [x]? ? Therapeutic exercise to improve tolerance and functional strength for ADLs/IADLs  [x]? ? Balance retraining/tolerance tasks for facilitation of postural control with dynamic challenges during ADLs   [x]? ? Neuromuscular re-education: facilitate righting/equilibrium reactions, midline orientation, scapular stability/mobility, normalize muscle tone, and facilitate active functional movement/attention  []? ? Delirium prevention/treatment   []? Positioning to improve functional independence and prevent skin breakdown   []? ?Other:        Time In: 9:06   Time Out: 9:31     Min Units   Therapeutic Ex [id number]     Therapeutic Activities [id number]   ADL/Self Care [id number] 15 1   Orthotic Management [id number]     Neuro Re-Ed [id number]     Non-Billable Time     TOTAL TIMED TREATMENT [address] [facility] LA/L [id number]

## 2021-02-26 NOTE — PROGRESS NOTES
Physical Therapy    Facility/Department: [address] MED SURG/TELE  Treatment note    NAME: Lillie Kellogg  : 1945  MRN: [id number]    Date of Service: 2021      Attending Provider:  Leslie Humphreys MD    Evaluating PT:  Penny Canchola. Faye Arredondo P.T. Room #:  3011/7817-B  Diagnosis:  Unable to ambulate, + for RLE DVT, multiple falls at facility PTA. Pertinent PMHx/PSHx:  Parkinson's Disease, Dementia, mild mental retardation  Precautions:  Falls, bed/chair alarm    SUBJECTIVE:    Pt lives at [facility] and ambulated with ww PTA. OBJECTIVE:   Initial Evaluation  Date: 21 Treatment  21 Short Term/ Long Term   Goals   Was pt agreeable to Eval/treatment? yes yes    Does pt have pain? C/o pain BLE No complaints    Bed Mobility  Rolling: MOD A  Supine to sit: MOD A  Sit to supine: MOD A  Scooting: MOD A Sit to supine: Mod A B LE support   SBA   Transfers Sit to stand: MOD A  Stand to sit: MOD A  Stand pivot: NA Sit to stand: Mod A  Stand to sit: Mod A   SBA/CGA   Ambulation   5 feet forward and backward with ww MIN A/MOD A 20 feet x 1 using WW for support Min/Mod A for balance 150 feet with ww CGA   Stair negotiation: ascended and descended NA NA NA   AM-PAC 6 Clicks 85/62         Pt is alert and able to follow instruction but displayed a slow response  Balance: poor dynamic using [employer] for support    Pt performed therapeutic exercise of the following: NT    Patient education  Pt was educated on UE usage to assist with transfers, gait promoting posture    Patient response to education:   Pt verbalized understanding Pt demonstrated skill Pt requires further education in this area   yes With prompt for transfers yes     ASSESSMENT:   Comments: Nurse ok with Rx. Pt found upright in a recliner chair falling asleep leaning to the L side, was awakened, agreed to Rx and then to get back to bed. Brief gait performed in the room, natalie slow, inconsistent and laboring, noted L lateral leaning at times.  Pt very unsteady, requires constant hands on support for balance and safety, is unsafe to gait or transfer alone presently. Treatment: Pt practiced and was instructed in the following treatment: gait and transfers    Pt was left in bed with call light in reach, Nurse present. Chair/bed alarm: bed alarm active    Time in 1013   Time out 1028   Total Treatment Time 15 minutes   CPT codes:     Therapeutic activities 90457 15 minutes   Therapeutic exercises 22264 0 minutes       Pt is making fair progress toward established Physical Therapy goals as per increased functional mobility performed. Continue with physical therapy current plan of care.     Shannon Gan PTA   License Number: [id number]

## 2021-02-26 NOTE — PLAN OF CARE
Problem: Falls - Risk of:  Goal: Will remain free from falls  Description: Will remain free from falls  2/26/2021 0348 by Stephanie Vegas RN  Outcome: Met This Shift     Problem: Skin Integrity:  Goal: Absence of new skin breakdown  Description: Absence of new skin breakdown  2/26/2021 0348 by Stephanie Vegas RN  Outcome: Met This Shift

## 2021-02-26 NOTE — CARE COORDINATION
Transport to [employer] tentatively set up for transport to [facility] 5:30 pm via physician's ambulance. N-N [phone]. Arthur Amaya

## 2021-02-26 NOTE — CARE COORDINATION
Received call from Andrea Dixon at [facility]; pt has been approved by insurance. perfectserve communication to Dr. Marquis Figueredo re; addressing discharge for today. Transport packet on chart. Cathy Quinn.

## 2021-02-27 ENCOUNTER — APPOINTMENT (OUTPATIENT)
Dept: CT IMAGING | Age: 76
DRG: 299 | End: 2021-02-27
Payer: COMMERCIAL

## 2021-02-27 ENCOUNTER — APPOINTMENT (OUTPATIENT)
Dept: ULTRASOUND IMAGING | Age: 76
DRG: 086 | End: 2021-02-27
Payer: COMMERCIAL

## 2021-02-27 ENCOUNTER — APPOINTMENT (OUTPATIENT)
Dept: GENERAL RADIOLOGY | Age: 76
DRG: 299 | End: 2021-02-27
Payer: COMMERCIAL

## 2021-02-27 ENCOUNTER — HOSPITAL ENCOUNTER (INPATIENT)
Age: 76
LOS: 2 days | Discharge: OTHER INSTITUTION | DRG: 086 | End: 2021-03-01
Attending: EMERGENCY MEDICINE | Admitting: SURGERY
Payer: COMMERCIAL

## 2021-02-27 ENCOUNTER — APPOINTMENT (OUTPATIENT)
Dept: CT IMAGING | Age: 76
DRG: 086 | End: 2021-02-27
Payer: COMMERCIAL

## 2021-02-27 ENCOUNTER — HOSPITAL ENCOUNTER (EMERGENCY)
Age: 76
Discharge: ANOTHER HOSPITAL | DRG: 299 | End: 2021-02-27
Attending: EMERGENCY MEDICINE
Payer: COMMERCIAL

## 2021-02-27 VITALS
TEMPERATURE: 97.8 F | HEART RATE: 90 BPM | OXYGEN SATURATION: 100 % | RESPIRATION RATE: 14 BRPM | WEIGHT: 104 LBS | SYSTOLIC BLOOD PRESSURE: 118 MMHG | DIASTOLIC BLOOD PRESSURE: 64 MMHG | HEIGHT: 65 IN | BODY MASS INDEX: 17.33 KG/M2

## 2021-02-27 DIAGNOSIS — I82.451 ACUTE DEEP VEIN THROMBOSIS (DVT) OF RIGHT PERONEAL VEIN (HCC): ICD-10-CM

## 2021-02-27 DIAGNOSIS — S06.5XAA SUBDURAL HEMATOMA: Primary | ICD-10-CM

## 2021-02-27 DIAGNOSIS — S09.90XA INJURY OF HEAD, INITIAL ENCOUNTER: ICD-10-CM
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
## 2021-02-27 DIAGNOSIS — S06.5XAA SDH (SUBDURAL HEMATOMA): Primary | ICD-10-CM

## 2021-02-27 PROBLEM — Z79.01 ANTICOAGULATED: Status: ACTIVE | Noted: 2021-02-27

## 2021-02-27 PROBLEM — G20.A1 PARKINSON'S DISEASE (HCC): Status: ACTIVE | Noted: 2021-02-27

## 2021-02-27 PROBLEM — I82.452 ACUTE DEEP VEIN THROMBOSIS (DVT) OF LEFT PERONEAL VEIN (HCC): Status: ACTIVE | Noted: 2021-02-27

## 2021-02-27 PROBLEM — G20 PARKINSON'S DISEASE (HCC): Status: ACTIVE | Noted: 2021-02-27

## 2021-02-27 PROBLEM — I82.452 ACUTE DEEP VEIN THROMBOSIS (DVT) OF LEFT PERONEAL VEIN (HCC): Status: RESOLVED | Noted: 2021-02-27 | Resolved: 2021-02-27

## 2021-02-27 LAB
ALBUMIN SERPL-MCNC: 3.2 G/DL (ref 3.5–5.2)
ALP BLD-CCNC: 28 U/L (ref 35–104)
ALT SERPL-CCNC: 17 U/L (ref 0–32)
ANGLE (CLOT STRENGTH): 79.1 DEGREE (ref 59–74)
ANION GAP SERPL CALCULATED.3IONS-SCNC: 9 MMOL/L (ref 7–16)
APTT: 30.7 SEC (ref 24.5–35.1)
AST SERPL-CCNC: 21 U/L (ref 0–31)
BASOPHILS ABSOLUTE: 0.07 E9/L (ref 0–0.2)
BASOPHILS RELATIVE PERCENT: 0.9 % (ref 0–2)
BILIRUB SERPL-MCNC: 0.4 MG/DL (ref 0–1.2)
BILIRUBIN URINE: NEGATIVE
BLOOD, URINE: NEGATIVE
BUN BLDV-MCNC: 17 MG/DL (ref 8–23)
CALCIUM SERPL-MCNC: 8.9 MG/DL (ref 8.6–10.2)
CHLORIDE BLD-SCNC: 107 MMOL/L (ref 98–107)
CLARITY: CLEAR
CO2: 25 MMOL/L (ref 22–29)
COLOR: YELLOW
CREAT SERPL-MCNC: 0.5 MG/DL (ref 0.5–1)
EKG ATRIAL RATE: 96 BPM
EKG P AXIS: -21 DEGREES
EKG P-R INTERVAL: 110 MS
EKG Q-T INTERVAL: 346 MS
EKG QRS DURATION: 70 MS
EKG QTC CALCULATION (BAZETT): 437 MS
EKG R AXIS: 0 DEGREES
EKG T AXIS: 59 DEGREES
EKG VENTRICULAR RATE: 96 BPM
EOSINOPHILS ABSOLUTE: 0.07 E9/L (ref 0.05–0.5)
EOSINOPHILS RELATIVE PERCENT: 0.9 % (ref 0–6)
EPL-TEG: 2 % (ref 0–15)
G-TEG: 10 K D/SC (ref 4.5–11)
GFR AFRICAN AMERICAN: >60
GFR NON-AFRICAN AMERICAN: >60 ML/MIN/1.73
GLUCOSE BLD-MCNC: 105 MG/DL (ref 74–99)
GLUCOSE URINE: NEGATIVE MG/DL
HCT VFR BLD CALC: 33.4 % (ref 34–48)
HEMOGLOBIN: 11.2 G/DL (ref 11.5–15.5)
IMMATURE GRANULOCYTES #: 0.13 E9/L
IMMATURE GRANULOCYTES %: 1.6 % (ref 0–5)
INR BLD: 1.2
K (CLOTTING TIME): 0.8 MIN (ref 1–3)
KETONES, URINE: NEGATIVE MG/DL
LEUKOCYTE ESTERASE, URINE: NEGATIVE
LY30 (FIBRINOLYSIS): 2 % (ref 0–8)
LYMPHOCYTES ABSOLUTE: 3.33 E9/L (ref 1.5–4)
LYMPHOCYTES RELATIVE PERCENT: 41.8 % (ref 20–42)
MA (MAX AMPLITUDE): 66.6 MM (ref 50–70)
MCH RBC QN AUTO: 34.3 PG (ref 26–35)
MCHC RBC AUTO-ENTMCNC: 33.5 % (ref 32–34.5)
MCV RBC AUTO: 102.1 FL (ref 80–99.9)
MONOCYTES ABSOLUTE: 0.95 E9/L (ref 0.1–0.95)
MONOCYTES RELATIVE PERCENT: 11.9 % (ref 2–12)
NEUTROPHILS ABSOLUTE: 3.42 E9/L (ref 1.8–7.3)
NEUTROPHILS RELATIVE PERCENT: 42.9 % (ref 43–80)
NITRITE, URINE: NEGATIVE
PDW BLD-RTO: 14.1 FL (ref 11.5–15)
PH UA: 7 (ref 5–9)
PLATELET # BLD: 256 E9/L (ref 130–450)
PMV BLD AUTO: 9.1 FL (ref 7–12)
POTASSIUM REFLEX MAGNESIUM: 3.9 MMOL/L (ref 3.5–5)
PRO-BNP: 331 PG/ML (ref 0–125)
PROTEIN UA: NEGATIVE MG/DL
PROTHROMBIN TIME: 13.9 SEC (ref 9.3–12.4)
R (REACTION TIME): 1.2 MIN (ref 5–10)
RBC # BLD: 3.27 E12/L (ref 3.5–5.5)
SODIUM BLD-SCNC: 141 MMOL/L (ref 132–146)
SPECIFIC GRAVITY UA: 1.01 (ref 1–1.03)
TOTAL PROTEIN: 5.8 G/DL (ref 6.4–8.3)
TROPONIN: <0.01 NG/ML (ref 0–0.03)
UROBILINOGEN, URINE: 0.2 E.U./DL
WBC # BLD: 8 E9/L (ref 4.5–11.5)

## 2021-02-27 PROCEDURE — 83880 ASSAY OF NATRIURETIC PEPTIDE: CPT

## 2021-02-27 PROCEDURE — 6370000000 HC RX 637 (ALT 250 FOR IP): Performed by: STUDENT IN AN ORGANIZED HEALTH CARE EDUCATION/TRAINING PROGRAM

## 2021-02-27 PROCEDURE — 84484 ASSAY OF TROPONIN QUANT: CPT

## 2021-02-27 PROCEDURE — 99223 1ST HOSP IP/OBS HIGH 75: CPT | Performed by: SURGERY

## 2021-02-27 PROCEDURE — 71045 X-RAY EXAM CHEST 1 VIEW: CPT

## 2021-02-27 PROCEDURE — 99284 EMERGENCY DEPT VISIT MOD MDM: CPT

## 2021-02-27 PROCEDURE — 72170 X-RAY EXAM OF PELVIS: CPT

## 2021-02-27 PROCEDURE — 81003 URINALYSIS AUTO W/O SCOPE: CPT

## 2021-02-27 PROCEDURE — 2580000003 HC RX 258: Performed by: STUDENT IN AN ORGANIZED HEALTH CARE EDUCATION/TRAINING PROGRAM

## 2021-02-27 PROCEDURE — 72125 CT NECK SPINE W/O DYE: CPT

## 2021-02-27 PROCEDURE — 93971 EXTREMITY STUDY: CPT | Performed by: RADIOLOGY

## 2021-02-27 PROCEDURE — 85576 BLOOD PLATELET AGGREGATION: CPT

## 2021-02-27 PROCEDURE — 70450 CT HEAD/BRAIN W/O DYE: CPT

## 2021-02-27 PROCEDURE — 85025 COMPLETE CBC W/AUTO DIFF WBC: CPT

## 2021-02-27 PROCEDURE — 6360000002 HC RX W HCPCS: Performed by: STUDENT IN AN ORGANIZED HEALTH CARE EDUCATION/TRAINING PROGRAM

## 2021-02-27 PROCEDURE — 85347 COAGULATION TIME ACTIVATED: CPT

## 2021-02-27 PROCEDURE — 96366 THER/PROPH/DIAG IV INF ADDON: CPT

## 2021-02-27 PROCEDURE — 87088 URINE BACTERIA CULTURE: CPT

## 2021-02-27 PROCEDURE — 36415 COLL VENOUS BLD VENIPUNCTURE: CPT

## 2021-02-27 PROCEDURE — 85384 FIBRINOGEN ACTIVITY: CPT

## 2021-02-27 PROCEDURE — 1200000000 HC SEMI PRIVATE

## 2021-02-27 PROCEDURE — 85730 THROMBOPLASTIN TIME PARTIAL: CPT

## 2021-02-27 PROCEDURE — 93010 ELECTROCARDIOGRAM REPORT: CPT | Performed by: INTERNAL MEDICINE

## 2021-02-27 PROCEDURE — 80053 COMPREHEN METABOLIC PANEL: CPT

## 2021-02-27 PROCEDURE — 85610 PROTHROMBIN TIME: CPT

## 2021-02-27 PROCEDURE — 96367 TX/PROPH/DG ADDL SEQ IV INF: CPT

## 2021-02-27 PROCEDURE — 96365 THER/PROPH/DIAG IV INF INIT: CPT

## 2021-02-27 PROCEDURE — 73562 X-RAY EXAM OF KNEE 3: CPT

## 2021-02-27 PROCEDURE — 99291 CRITICAL CARE FIRST HOUR: CPT

## 2021-02-27 PROCEDURE — 93005 ELECTROCARDIOGRAM TRACING: CPT | Performed by: STUDENT IN AN ORGANIZED HEALTH CARE EDUCATION/TRAINING PROGRAM

## 2021-02-27 PROCEDURE — 93971 EXTREMITY STUDY: CPT

## 2021-02-27 RX ORDER — LEVETIRACETAM 500 MG/1
500 TABLET ORAL 2 TIMES DAILY
Status: DISCONTINUED | OUTPATIENT
Start: 2021-02-27 | End: 2021-03-01 | Stop reason: HOSPADM

## 2021-02-27 RX ORDER — SODIUM CHLORIDE 0.9 % (FLUSH) 0.9 %
10 SYRINGE (ML) INJECTION PRN
Status: DISCONTINUED | OUTPATIENT
Start: 2021-02-27 | End: 2021-03-01 | Stop reason: HOSPADM

## 2021-02-27 RX ORDER — ACETAMINOPHEN 325 MG/1
650 TABLET ORAL ONCE
Status: DISCONTINUED | OUTPATIENT
Start: 2021-02-27 | End: 2021-02-27

## 2021-02-27 RX ORDER — ONDANSETRON 4 MG/1
4 TABLET, ORALLY DISINTEGRATING ORAL EVERY 8 HOURS PRN
Status: DISCONTINUED | OUTPATIENT
Start: 2021-02-27 | End: 2021-03-01 | Stop reason: HOSPADM

## 2021-02-27 RX ORDER — ONDANSETRON 2 MG/ML
4 INJECTION INTRAMUSCULAR; INTRAVENOUS EVERY 6 HOURS PRN
Status: DISCONTINUED | OUTPATIENT
Start: 2021-02-27 | End: 2021-03-01 | Stop reason: HOSPADM

## 2021-02-27 RX ORDER — HYDROCODONE BITARTRATE AND ACETAMINOPHEN 5; 325 MG/1; MG/1
1 TABLET ORAL EVERY 6 HOURS PRN
Status: DISCONTINUED | OUTPATIENT
Start: 2021-02-27 | End: 2021-02-28

## 2021-02-27 RX ORDER — SODIUM CHLORIDE 0.9 % (FLUSH) 0.9 %
10 SYRINGE (ML) INJECTION EVERY 12 HOURS SCHEDULED
Status: DISCONTINUED | OUTPATIENT
Start: 2021-02-27 | End: 2021-03-01 | Stop reason: HOSPADM

## 2021-02-27 RX ORDER — ACETAMINOPHEN 325 MG/1
650 TABLET ORAL EVERY 4 HOURS
Status: DISCONTINUED | OUTPATIENT
Start: 2021-02-27 | End: 2021-03-01 | Stop reason: HOSPADM

## 2021-02-27 RX ORDER — 0.9 % SODIUM CHLORIDE 0.9 %
500 INTRAVENOUS SOLUTION INTRAVENOUS ONCE
Status: COMPLETED | OUTPATIENT
Start: 2021-02-27 | End: 2021-02-27

## 2021-02-27 RX ORDER — SENNA AND DOCUSATE SODIUM 50; 8.6 MG/1; MG/1
2 TABLET, FILM COATED ORAL 2 TIMES DAILY
Status: DISCONTINUED | OUTPATIENT
Start: 2021-02-27 | End: 2021-03-01 | Stop reason: HOSPADM

## 2021-02-27 RX ADMIN — ANTI-INHIBITOR COAGULANT COMPLEX 2500 UNITS: KIT at 10:41

## 2021-02-27 RX ADMIN — ACETAMINOPHEN 650 MG: 325 TABLET ORAL at 20:50

## 2021-02-27 RX ADMIN — BISACODYL 10 MG: 5 TABLET, COATED ORAL at 17:36

## 2021-02-27 RX ADMIN — LEVETIRACETAM 500 MG: 500 TABLET ORAL at 17:36

## 2021-02-27 RX ADMIN — DOCUSATE SODIUM 50 MG AND SENNOSIDES 8.6 MG 2 TABLET: 8.6; 5 TABLET, FILM COATED ORAL at 17:36

## 2021-02-27 RX ADMIN — LEVETIRACETAM 500 MG: 500 TABLET ORAL at 20:48

## 2021-02-27 RX ADMIN — SODIUM CHLORIDE 500 ML: 9 INJECTION, SOLUTION INTRAVENOUS at 09:41

## 2021-02-27 RX ADMIN — DOCUSATE SODIUM 50 MG AND SENNOSIDES 8.6 MG 2 TABLET: 8.6; 5 TABLET, FILM COATED ORAL at 20:48

## 2021-02-27 RX ADMIN — Medication 10 ML: at 20:50

## 2021-02-27 RX ADMIN — ACETAMINOPHEN 650 MG: 325 TABLET ORAL at 15:09

## 2021-02-27 ASSESSMENT — PAIN DESCRIPTION - PAIN TYPE
TYPE: ACUTE PAIN
TYPE: ACUTE PAIN

## 2021-02-27 ASSESSMENT — PAIN DESCRIPTION - LOCATION
LOCATION: HEAD
LOCATION: HEAD

## 2021-02-27 ASSESSMENT — ENCOUNTER SYMPTOMS
COUGH: 0
BACK PAIN: 0
ABDOMINAL PAIN: 0
RHINORRHEA: 0
SHORTNESS OF BREATH: 0
NAUSEA: 0
VOMITING: 0
DIARRHEA: 0
SORE THROAT: 0

## 2021-02-27 ASSESSMENT — PAIN DESCRIPTION - ONSET: ONSET: ON-GOING

## 2021-02-27 ASSESSMENT — PAIN DESCRIPTION - FREQUENCY: FREQUENCY: CONTINUOUS

## 2021-02-27 ASSESSMENT — PAIN DESCRIPTION - PROGRESSION
CLINICAL_PROGRESSION: NOT CHANGED
CLINICAL_PROGRESSION: NOT CHANGED

## 2021-02-27 ASSESSMENT — PAIN SCALES - GENERAL
PAINLEVEL_OUTOF10: 6
PAINLEVEL_OUTOF10: 10

## 2021-02-27 ASSESSMENT — PAIN DESCRIPTION - DESCRIPTORS
DESCRIPTORS: ACHING
DESCRIPTORS: ACHING

## 2021-02-27 ASSESSMENT — PAIN - FUNCTIONAL ASSESSMENT: PAIN_FUNCTIONAL_ASSESSMENT: PREVENTS OR INTERFERES SOME ACTIVE ACTIVITIES AND ADLS

## 2021-02-27 NOTE — PLAN OF CARE
Problem: Pain:  Goal: Pain level will decrease  Description: Pain level will decrease  Outcome: Ongoing  Goal: Control of acute pain  Description: Control of acute pain  Outcome: Ongoing  Goal: Control of chronic pain  Description: Control of chronic pain  Outcome: Ongoing     Problem: Falls - Risk of:  Goal: Will remain free from falls  Description: Will remain free from falls  Outcome: Met This Shift  Goal: Absence of physical injury  Description: Absence of physical injury  Outcome: Met This Shift     Problem: Skin Integrity:  Goal: Will show no infection signs and symptoms  Description: Will show no infection signs and symptoms  Outcome: Met This Shift  Goal: Absence of new skin breakdown  Description: Absence of new skin breakdown  Outcome: Met This Shift

## 2021-02-27 NOTE — ED PROVIDER NOTES
HPI:  2/27/21, Time: 2:31 PM TORI Barber is a 70 y.o. female presenting to the ED for Fall BHI patient is currently on anticoagulation had a fall at the nursing home was seen at [facility] found to have a frontal subdural hematoma. She was sent for trauma transfer for further evaluation. , beginning several hours ago. The complaint has been persistent, mild in severity, and worsened by nothing. There is no neurological deficits patient is a poor historian because of underlying dementia. All history is obtained from ED physician at [facility]. ROS:   Pertinent positives and negatives are stated within HPI, all other systems reviewed and are negative.  --------------------------------------------- PAST HISTORY ---------------------------------------------  Past Medical History:  has a past medical history of Anxiety, Arthritis, Asthma, Dementia ([facility] [address] Depression, Depression, Hyperlipidemia, Mild mental retardation, Osteoporosis, and Parkinson disease ([facility] [address] Past Surgical History:  has a past surgical history that includes Tonsillectomy and hernia repair. Social History:  reports that she has never smoked. She has never used smokeless tobacco. She reports that she does not drink alcohol or use drugs. Family History: Family history is unknown by patient. The patients home medications have been reviewed. Allergies: Pcn [penicillins]    ---------------------------------------------------PHYSICAL EXAM--------------------------------------    Constitutional/General: Alert and oriented x3, well appearing, non toxic in NAD  Head: Normocephalic and atraumatic  Eyes: PERRL, EOMI  Mouth: Oropharynx clear, handling secretions, no trismus  Neck: Supple, full ROM, non tender to palpation in the midline, no stridor, no crepitus, no meningeal signs  Pulmonary: Lungs clear to auscultation bilaterally, no wheezes, rales, or rhonchi. Not in respiratory distress  Cardiovascular:  Regular rate. Regular rhythm. No murmurs, gallops, or rubs. 2+ distal pulses  Chest: no chest wall tenderness  Abdomen: Soft. Non tender. Non distended. +BS. No rebound, guarding, or rigidity. No pulsatile masses appreciated. Musculoskeletal: Moves all extremities x 4. Warm and well perfused, no clubbing, cyanosis, or edema. Capillary refill <3 seconds  Skin: warm and dry. No rashes. Neurologic: GCS 15, CN 2-12 grossly intact, no focal deficits, symmetric strength 5/5 in the upper and lower extremities bilaterally  Psych: Normal Affect    -------------------------------------------------- RESULTS -------------------------------------------------  I have personally reviewed all laboratory and imaging results for this patient. Results are listed below.      LABS:  Results for orders placed or performed during the hospital encounter of 02/27/21   TEG lab test   Result Value Ref Range    R (Reaction Time) 1.2 (L) 5.0 - 10.0 min    K (Clotting Time) 0.8 (L) 1.0 - 3.0 min    Angle (Clot Strength) 79.1 (H) 59.0 - 74.0 degree    MA (Max Amplitude) 66.6 50.0 - 70.0 mm    G-TEG 10.0 4.5 - 11.0 K d/sc    EPL-TEG 2.0 0.0 - 15.0 %    LY30 (Fibrinolysis) 2.0 0.0 - 8.0 %   Basic Metabolic Panel w/ Reflex to MG   Result Value Ref Range    Sodium 140 132 - 146 mmol/L    Potassium reflex Magnesium 4.0 3.5 - 5.0 mmol/L    Chloride 108 (H) 98 - 107 mmol/L    CO2 26 22 - 29 mmol/L    Anion Gap 6 (L) 7 - 16 mmol/L    Glucose 79 74 - 99 mg/dL    BUN 12 8 - 23 mg/dL    CREATININE 0.5 0.5 - 1.0 mg/dL    GFR Non-African American >60 >=60 mL/min/1.73    GFR African American >60     Calcium 8.9 8.6 - 10.2 mg/dL   CBC auto differential   Result Value Ref Range    WBC 5.9 4.5 - 11.5 E9/L    RBC 3.24 (L) 3.50 - 5.50 E12/L    Hemoglobin 10.9 (L) 11.5 - 15.5 g/dL    Hematocrit 32.3 (L) 34.0 - 48.0 %    MCV 99.7 80.0 - 99.9 fL    MCH 33.6 26.0 - 35.0 pg    MCHC 33.7 32.0 - 34.5 %    RDW 14.0 11.5 - 15.0 fL    Platelets 683 935 - 081 E9/L    MPV 9.3 7.0 - 12.0 fL    Neutrophils % 43.1 43.0 - 80.0 %    Immature Granulocytes % 1.2 0.0 - 5.0 %    Lymphocytes % 37.5 20.0 - 42.0 %    Monocytes % 15.9 (H) 2.0 - 12.0 %    Eosinophils % 0.9 0.0 - 6.0 %    Basophils % 1.4 0.0 - 2.0 %    Neutrophils Absolute 2.53 1.80 - 7.30 E9/L    Immature Granulocytes # 0.07 E9/L    Lymphocytes Absolute 2.20 1.50 - 4.00 E9/L    Monocytes Absolute 0.93 0.10 - 0.95 E9/L    Eosinophils Absolute 0.05 0.05 - 0.50 E9/L    Basophils Absolute 0.08 0.00 - 0.20 E9/L       RADIOLOGY:  Interpreted by RadiologistMarielena GUILLAUME LOWER EXTREMITY RIGHT RAUL   Final Result   Within the visualized vessels there is no evidence for deep venous   thrombosis      CT HEAD WO CONTRAST   Final Result   Stable left frontal, right parietal, and parafalcine subdural hematomas. No   new intracranial hemorrhage or edema. Normal  ------------------------- NURSING NOTES AND VITALS REVIEWED ---------------------------   The nursing notes within the ED encounter and vital signs as below have been reviewed by myself. /60   Pulse 92   Temp 98.3 °F (36.8 °C) (Temporal)   Resp 17   Ht 5' 5\" (1.651 m)   Wt 104 lb (47.2 kg)   SpO2 98%   BMI 17.31 kg/m²   Oxygen Saturation Interpretation: Normal    The patients available past medical records and past encounters were reviewed.         ------------------------------ ED COURSE/MEDICAL DECISION MAKING----------------------  Medications   sodium chloride flush 0.9 % injection 10 mL (10 mLs Intravenous Given 2/28/21 2340)   sodium chloride flush 0.9 % injection 10 mL (has no administration in time range)   acetaminophen (TYLENOL) tablet 650 mg (650 mg Oral Given 2/28/21 1437)   ondansetron (ZOFRAN-ODT) disintegrating tablet 4 mg (has no administration in time range)     Or   ondansetron (ZOFRAN) injection 4 mg (has no administration in time range)   bisacodyl (DULCOLAX) EC tablet 10 mg (10 mg Oral Given 2/28/21 2543)   sennosides-docusate sodium (SENOKOT-S) 8.6-50 MG tablet 2 tablet (2 tablets Oral Given 2/28/21 0851)   levETIRAcetam (KEPPRA) tablet 500 mg (500 mg Oral Given 2/28/21 0851)   HYDROcodone-acetaminophen (NORCO) 5-325 MG per tablet 1 tablet (has no administration in time range)   ceFAZolin (ANCEF) 2000 mg in sterile water 20 mL IV syringe (has no administration in time range)   0.9 % sodium chloride infusion ( Intravenous New Bag 2/28/21 0651)   carbidopa-levodopa (SINEMET)  MG per tablet 1 tablet (1 tablet Oral Given 2/28/21 1619)             Medical Decision Making:    Patient was sent for trauma evaluation for small subdural hematoma on anticoagulation. Patient did receive 5 prior to transfer. She is awake and alert has underlying dementia as he is pleasantly confused. She has no complaints. Trauma consult was initiated. Repeat CT without contrast was ordered. Re-Evaluations:             Re-evaluation. Patients symptoms show no change      Consultations:             Trauma     Critical Care: none        This patient's ED course included: a personal history and physicial examination, re-evaluation prior to disposition, complex medical decision making and emergency management and a personal history and physicial eaxmination    This patient has remained hemodynamically stable and remained unchanged during their ED course. Counseling: The emergency provider has spoken with the patient and discussed todays results, in addition to providing specific details for the plan of care and counseling regarding the diagnosis and prognosis. Questions are answered at this time and they are agreeable with the plan.       --------------------------------- IMPRESSION AND DISPOSITION ---------------------------------    IMPRESSION  1. SDH (subdural hematoma) ([facility] [address]    2. Injury of head, initial encounter    3.  Fall, initial encounter        DISPOSITION  Disposition: Admit to med/surg floor  Patient condition is stable        NOTE: This report was transcribed using voice recognition software.  Every effort was made to ensure accuracy; however, inadvertent computerized transcription errors may be present        Parish Fenton,   02/28/21 Marilee Scherer DO  02/28/21 7775

## 2021-02-27 NOTE — H&P
Yes   Right Yes    Hand grasp:   Left  Present      Right  Present  Plantar flexion: Left  Present      Right   Present    Loss of consciousness:  No  History Obtained From:  Patient & EMS  Private Medical Doctor: Alejandrina Malhotra MD      Pre-exisiting Medical History:  unknown    Conditions: Unknown    Medications: Per chart review was on Eliquis, then Xarelto, then Therapeutic Lovenox for recent DVT    Allergies: Penicillin    Social History:   Tobacco use:  none  Alcohol use:  none  Illicit drug use:  no history of illicit drug use    Past Surgical History:  Unknown    Anticoagulant use:  Yes Eliquis swithced to Xarelto then switched to Therapeutic Lovenox  Antiplatelet use:    No     NSAID use in last 72 hours: unknown  Taken PCN in past:  Yes, allergic  Last food/drink: Unknown  Last tetanus: Unknown    Family History:   Not pertinent to presenting problem. Complaints:   Head:  Mild  Neck:   Mild  Chest:   None  Back:   None  Abdomen:   None  Extremities:   Mild  Comments: Headache, neck pain (chronic), left knee pain    Review of systems:  All negative unless otherwise noted. SECONDARY SURVEY  Head/scalp: Atraumatic    Face: Atraumatic    Eyes/ears/nose: Atraumatic    Pharynx/mouth: Atraumatic    Neck: Atraumatic     Cervical spine tenderness:   Cervical collar not indicated  Pain:  mild  ROM:  Full    Chest wall:  Atraumatic    Heart:  Regular rate & rhythm    Abdomen: Atraumatic. Soft ND  Tenderness:  none    Pelvis: Atraumatic  Tenderness: none    Thoracolumbar spine: Atraumatic  Tenderness:  none    Genitourinary:  Atraumatic. No blood or urine noted    Rectum: Atraumatic. No blood noted. Perineum: Atraumatic. No blood or urine noted.       Extremities: L knee TTP  Sensory normal  Motor normal    Distal Pulses  Left arm normal  Right arm normal  Left leg normal  Right leg normal    Capillary refill  Left arm normal  Right arm normal  Left leg normal  Right leg normal    Procedures in ED:  None    In the event of Emergency Blood Transfusion:  Due to the critical condition of this patient, I request the immediate release of blood products for emergency transfusion secondary to shock. I understand the increased risks incurred by the lack of complete transfusion testing.       Radiology: Chest Xray , Pelvic Xray , CT Head  and CT Cervical spine     Consultations:  Neurosurgery and Vascular surgery    Admission/Diagnosis: Trauma, Fall, SDH    Plan of Treatment:  - Tert  - TEG  - Repeat head CT is stable  - NSG recs  - Keppra  - Admit to general floor  - Vascular consult for IVCF    Plan discussed with Dr. Justin Bethea at 2/27/2021 on 2:04 PM    Electronically signed by Jarett Tang MD on 2/27/2021 at 2:04 PM

## 2021-02-27 NOTE — ED PROVIDER NOTES
Porter Sage is a 70 y.o. female with a PMHx significant for parkinson's dementia, HLD who presents for evaluation of unwitnessed fall, beginning prior to arrival.  The complaint has been persistent, moderate in severity, and worsened by nothing. The patient states that she was walking when she slipped on the hard floor and fell back into her dresser. Patient denies any LOC, but states that her head hurts. Patient is anticoagulated per nursing records. Currently the patient is awake, alert, and oriented to person, place, and time. States that she has a headache and left knee discomfort. Patient denies any chest pain, shortness of breath, nausea, vomiting, dizziness or lightheadedness. The history is provided by the patient and medical records. Review of Systems   Constitutional: Negative for chills, diaphoresis and fever. HENT: Negative for congestion, rhinorrhea and sore throat. Eyes: Negative for visual disturbance. Respiratory: Negative for cough and shortness of breath. Cardiovascular: Negative for chest pain. Gastrointestinal: Negative for abdominal pain, diarrhea, nausea and vomiting. Genitourinary: Negative for dysuria and urgency. Musculoskeletal: Negative for back pain. Knee pain     Skin: Negative for rash. Neurological: Positive for headaches. Negative for dizziness, light-headedness and numbness. Physical Exam  Vitals signs and nursing note reviewed. Constitutional:       General: She is not in acute distress. Appearance: Normal appearance. She is well-developed. She is not ill-appearing. HENT:      Head: Normocephalic and atraumatic. Comments: No srinivasan sign or raccoon eyes     Right Ear: External ear normal.      Left Ear: External ear normal.   Eyes:      General:         Right eye: No discharge. Left eye: No discharge. Extraocular Movements: Extraocular movements intact.       Conjunctiva/sclera: Conjunctivae normal.   Neck: Musculoskeletal: Normal range of motion and neck supple. Cardiovascular:      Rate and Rhythm: Normal rate and regular rhythm. Heart sounds: Normal heart sounds. No murmur. Pulmonary:      Effort: Pulmonary effort is normal. No respiratory distress. Breath sounds: Normal breath sounds. No stridor. No wheezing. Abdominal:      General: There is no distension. Palpations: Abdomen is soft. There is no mass. Tenderness: There is no abdominal tenderness. There is no guarding or rebound. Hernia: No hernia is present. Musculoskeletal:         General: Tenderness present. Comments: Tenderness palpation of left knee    Skin:     General: Skin is warm and dry. Coloration: Skin is not jaundiced or pale. Neurological:      General: No focal deficit present. Mental Status: She is alert and oriented to person, place, and time. Procedures     MDM     ED Course as of Feb 28 0735   Sat Feb 27, 2021   1278 Of note when staff attempted to take Orthostatics the patient was felt, therefore additional labs will be ordered at this time. [BB]   1007 Critical care:  Please note that the withdrawal or failure to initiate urgent interventions for this patient would likely result in a life threatening deterioration or permanent disability. Accordingly this patient received 30 minutes of critical care time, excluding separately billable procedures. [FELIPE]   1007 Feiba ordered, small SDH on CT scan    [FELIPE]   1111 Patient to be transferred to [facility]. Feiba was given. Vitals are stable. [BB]      ED Course User Index  [BB] Yris Ewing DO  [FELIPE] Mariely Garcia DO      Sidra Zepedas presents to the ED for evaluation of fall. Workup in the ED revealed CT head demonstrating subdural hematoma. CT-cervical spine negative. Case discussed with ER physician at [facility] for the patient to be evaluated by trauma services.  Patient is anticoagulated and was reversed with Feiba. Patient requires continued workup and management of their symptoms and will be admitted to the hospital for further evaluation and treatment.    --------------------------------------------- PAST HISTORY ---------------------------------------------  Past Medical History:  has a past medical history of Anxiety, Arthritis, Asthma, Dementia ([facility] [address] Depression, Depression, Hyperlipidemia, Mild mental retardation, Osteoporosis, and Parkinson disease ([facility] [address] Past Surgical History:  has a past surgical history that includes Tonsillectomy and hernia repair. Social History:  reports that she has never smoked. She has never used smokeless tobacco. She reports that she does not drink alcohol or use drugs. Family History: Family history is unknown by patient. The patients home medications have been reviewed.     Allergies: Pcn [penicillins]    -------------------------------------------------- RESULTS -------------------------------------------------    Lab  Results for orders placed or performed during the hospital encounter of 02/27/21   CBC Auto Differential   Result Value Ref Range    WBC 8.0 4.5 - 11.5 E9/L    RBC 3.27 (L) 3.50 - 5.50 E12/L    Hemoglobin 11.2 (L) 11.5 - 15.5 g/dL    Hematocrit 33.4 (L) 34.0 - 48.0 %    .1 (H) 80.0 - 99.9 fL    MCH 34.3 26.0 - 35.0 pg    MCHC 33.5 32.0 - 34.5 %    RDW 14.1 11.5 - 15.0 fL    Platelets 765 886 - 674 E9/L    MPV 9.1 7.0 - 12.0 fL    Neutrophils % 42.9 (L) 43.0 - 80.0 %    Immature Granulocytes % 1.6 0.0 - 5.0 %    Lymphocytes % 41.8 20.0 - 42.0 %    Monocytes % 11.9 2.0 - 12.0 %    Eosinophils % 0.9 0.0 - 6.0 %    Basophils % 0.9 0.0 - 2.0 %    Neutrophils Absolute 3.42 1.80 - 7.30 E9/L    Immature Granulocytes # 0.13 E9/L    Lymphocytes Absolute 3.33 1.50 - 4.00 E9/L    Monocytes Absolute 0.95 0.10 - 0.95 E9/L    Eosinophils Absolute 0.07 0.05 - 0.50 E9/L    Basophils Absolute 0.07 0.00 - 0.20 E9/L Comprehensive Metabolic Panel w/ Reflex to MG   Result Value Ref Range    Sodium 141 132 - 146 mmol/L    Potassium reflex Magnesium 3.9 3.5 - 5.0 mmol/L    Chloride 107 98 - 107 mmol/L    CO2 25 22 - 29 mmol/L    Anion Gap 9 7 - 16 mmol/L    Glucose 105 (H) 74 - 99 mg/dL    BUN 17 8 - 23 mg/dL    CREATININE 0.5 0.5 - 1.0 mg/dL    GFR Non-African American >60 >=60 mL/min/1.73    GFR African American >60     Calcium 8.9 8.6 - 10.2 mg/dL    Total Protein 5.8 (L) 6.4 - 8.3 g/dL    Albumin 3.2 (L) 3.5 - 5.2 g/dL    Total Bilirubin 0.4 0.0 - 1.2 mg/dL    Alkaline Phosphatase 28 (L) 35 - 104 U/L    ALT 17 0 - 32 U/L    AST 21 0 - 31 U/L   Troponin   Result Value Ref Range    Troponin <0.01 0.00 - 0.03 ng/mL   Brain Natriuretic Peptide   Result Value Ref Range    Pro- (H) 0 - 125 pg/mL   Urinalysis, reflex to microscopic   Result Value Ref Range    Color, UA Yellow Straw/Yellow    Clarity, UA Clear Clear    Glucose, Ur Negative Negative mg/dL    Bilirubin Urine Negative Negative    Ketones, Urine Negative Negative mg/dL    Specific Gravity, UA 1.015 1.005 - 1.030    Blood, Urine Negative Negative    pH, UA 7.0 5.0 - 9.0    Protein, UA Negative Negative mg/dL    Urobilinogen, Urine 0.2 <2.0 E.U./dL    Nitrite, Urine Negative Negative    Leukocyte Esterase, Urine Negative Negative   APTT   Result Value Ref Range    aPTT 30.7 24.5 - 35.1 sec   Protime-INR   Result Value Ref Range    Protime 13.9 (H) 9.3 - 12.4 sec    INR 1.2    EKG 12 Lead   Result Value Ref Range    Ventricular Rate 96 BPM    Atrial Rate 96 BPM    P-R Interval 110 ms    QRS Duration 70 ms    Q-T Interval 346 ms    QTc Calculation (Bazett) 437 ms    P Axis -21 degrees    R Axis 0 degrees    T Axis 59 degrees       Radiology  XR CHEST PORTABLE   Final Result   Redemonstration of interstitial prominence in perihilar locations which could   suggest peribronchial inflammatory changes or pulmonary vascular congestion.    No focal airspace opacity or pleural effusion. CT Head WO Contrast   Final Result   1. Subdural hematomas overly left frontal lobe and anterior falx. 2.  No mass effect or midline shift. Critical results were called by Dr. Jennifer Fry to Dr. Tia Noel on   2/27/2021 at 09:52. CT Cervical Spine WO Contrast   Final Result   No acute abnormality of the cervical spine. XR KNEE LEFT (3 VIEWS)   Final Result   Similar chronic appearing findings. MRI would be useful if symptoms persist.      XR PELVIS (1-2 VIEWS)   Final Result   No acute bony abnormality. MRI or CT would be useful if symptoms persist.          ------------------------- NURSING NOTES AND VITALS REVIEWED ---------------------------  Date / Time Roomed:  2/27/2021  5:41 AM  ED Bed Assignment:  09/09    The nursing notes within the ED encounter and vital signs as below have been reviewed. Patient Vitals for the past 24 hrs:   BP Temp Temp src Pulse Resp SpO2   02/27/21 1051 118/64 97.8 °F (36.6 °C) Oral 90 14 100 %   02/27/21 1029 125/63 -- -- 79 14 96 %   02/27/21 0940 119/64 -- -- 91 14 94 %       Oxygen Saturation Interpretation: Normal      ------------------------------------------ PROGRESS NOTES ------------------------------------------  Re-evaluation(s):  I have spoken with the patient and discussed todays results, in addition to providing specific details for the plan of care and counseling regarding the diagnosis and prognosis. Their questions are answered at this time and they are agreeable with the plan.      --------------------------------- ADDITIONAL PROVIDER NOTES ---------------------------------  Consultations:  Spoke with Dr. Flora Canales,  They will accept transfer of the patient to the ER for evaluation by trauma services.     This patient's ED course included: a personal history and physicial examination, re-evaluation prior to disposition, multiple bedside re-evaluations, cardiac monitoring, continuous pulse oximetry and complex medical decision making and emergency management    This patient has remained hemodynamically stable and been closely monitored during their ED course. Please note that the withdrawal or failure to initiate urgent interventions for this patient would likely result in a life threatening deterioration or permanent disability. Accordingly this patient received 30 minutes of critical care time, excluding separately billable procedures. Clinical Impression  1. Subdural hematoma ([facility] [address]          Disposition  Patient's disposition: Transfer to [facility] ER  Patient's condition is serious.            Zoie Ryan DO  Resident  02/28/21 [id number]

## 2021-02-28 LAB
ANION GAP SERPL CALCULATED.3IONS-SCNC: 6 MMOL/L (ref 7–16)
BASOPHILS ABSOLUTE: 0.08 E9/L (ref 0–0.2)
BASOPHILS RELATIVE PERCENT: 1.4 % (ref 0–2)
BUN BLDV-MCNC: 12 MG/DL (ref 8–23)
CALCIUM SERPL-MCNC: 8.9 MG/DL (ref 8.6–10.2)
CHLORIDE BLD-SCNC: 108 MMOL/L (ref 98–107)
CO2: 26 MMOL/L (ref 22–29)
CREAT SERPL-MCNC: 0.5 MG/DL (ref 0.5–1)
EOSINOPHILS ABSOLUTE: 0.05 E9/L (ref 0.05–0.5)
EOSINOPHILS RELATIVE PERCENT: 0.9 % (ref 0–6)
GFR AFRICAN AMERICAN: >60
GFR NON-AFRICAN AMERICAN: >60 ML/MIN/1.73
GLUCOSE BLD-MCNC: 79 MG/DL (ref 74–99)
HCT VFR BLD CALC: 32.3 % (ref 34–48)
HEMOGLOBIN: 10.9 G/DL (ref 11.5–15.5)
IMMATURE GRANULOCYTES #: 0.07 E9/L
IMMATURE GRANULOCYTES %: 1.2 % (ref 0–5)
LYMPHOCYTES ABSOLUTE: 2.2 E9/L (ref 1.5–4)
LYMPHOCYTES RELATIVE PERCENT: 37.5 % (ref 20–42)
MCH RBC QN AUTO: 33.6 PG (ref 26–35)
MCHC RBC AUTO-ENTMCNC: 33.7 % (ref 32–34.5)
MCV RBC AUTO: 99.7 FL (ref 80–99.9)
MONOCYTES ABSOLUTE: 0.93 E9/L (ref 0.1–0.95)
MONOCYTES RELATIVE PERCENT: 15.9 % (ref 2–12)
NEUTROPHILS ABSOLUTE: 2.53 E9/L (ref 1.8–7.3)
NEUTROPHILS RELATIVE PERCENT: 43.1 % (ref 43–80)
PDW BLD-RTO: 14 FL (ref 11.5–15)
PLATELET # BLD: 242 E9/L (ref 130–450)
PMV BLD AUTO: 9.3 FL (ref 7–12)
POTASSIUM REFLEX MAGNESIUM: 4 MMOL/L (ref 3.5–5)
RBC # BLD: 3.24 E12/L (ref 3.5–5.5)
SODIUM BLD-SCNC: 140 MMOL/L (ref 132–146)
WBC # BLD: 5.9 E9/L (ref 4.5–11.5)

## 2021-02-28 PROCEDURE — 80048 BASIC METABOLIC PNL TOTAL CA: CPT

## 2021-02-28 PROCEDURE — 99222 1ST HOSP IP/OBS MODERATE 55: CPT | Performed by: NEUROLOGICAL SURGERY

## 2021-02-28 PROCEDURE — 97166 OT EVAL MOD COMPLEX 45 MIN: CPT

## 2021-02-28 PROCEDURE — 97535 SELF CARE MNGMENT TRAINING: CPT

## 2021-02-28 PROCEDURE — 99232 SBSQ HOSP IP/OBS MODERATE 35: CPT | Performed by: SURGERY

## 2021-02-28 PROCEDURE — 97162 PT EVAL MOD COMPLEX 30 MIN: CPT

## 2021-02-28 PROCEDURE — 85025 COMPLETE CBC W/AUTO DIFF WBC: CPT

## 2021-02-28 PROCEDURE — 99221 1ST HOSP IP/OBS SF/LOW 40: CPT | Performed by: PSYCHIATRY & NEUROLOGY

## 2021-02-28 PROCEDURE — 6370000000 HC RX 637 (ALT 250 FOR IP): Performed by: PSYCHIATRY & NEUROLOGY

## 2021-02-28 PROCEDURE — 2580000003 HC RX 258: Performed by: STUDENT IN AN ORGANIZED HEALTH CARE EDUCATION/TRAINING PROGRAM

## 2021-02-28 PROCEDURE — 97530 THERAPEUTIC ACTIVITIES: CPT

## 2021-02-28 PROCEDURE — 1200000000 HC SEMI PRIVATE

## 2021-02-28 PROCEDURE — 6370000000 HC RX 637 (ALT 250 FOR IP): Performed by: STUDENT IN AN ORGANIZED HEALTH CARE EDUCATION/TRAINING PROGRAM

## 2021-02-28 PROCEDURE — 36415 COLL VENOUS BLD VENIPUNCTURE: CPT

## 2021-02-28 RX ORDER — HEPARIN SODIUM 10000 [USP'U]/ML
5000 INJECTION, SOLUTION INTRAVENOUS; SUBCUTANEOUS EVERY 8 HOURS
Status: DISCONTINUED | OUTPATIENT
Start: 2021-03-01 | End: 2021-03-01 | Stop reason: HOSPADM

## 2021-02-28 RX ORDER — SODIUM CHLORIDE 9 MG/ML
INJECTION, SOLUTION INTRAVENOUS CONTINUOUS
Status: DISCONTINUED | OUTPATIENT
Start: 2021-02-28 | End: 2021-02-28

## 2021-02-28 RX ORDER — HEPARIN SODIUM 10000 [USP'U]/ML
5000 INJECTION, SOLUTION INTRAVENOUS; SUBCUTANEOUS EVERY 8 HOURS SCHEDULED
Status: DISCONTINUED | OUTPATIENT
Start: 2021-02-28 | End: 2021-02-28

## 2021-02-28 RX ADMIN — DOCUSATE SODIUM 50 MG AND SENNOSIDES 8.6 MG 2 TABLET: 8.6; 5 TABLET, FILM COATED ORAL at 20:26

## 2021-02-28 RX ADMIN — LEVETIRACETAM 500 MG: 500 TABLET ORAL at 20:26

## 2021-02-28 RX ADMIN — ACETAMINOPHEN 650 MG: 325 TABLET ORAL at 18:39

## 2021-02-28 RX ADMIN — LEVETIRACETAM 500 MG: 500 TABLET ORAL at 08:51

## 2021-02-28 RX ADMIN — CARBIDOPA AND LEVODOPA 1 TABLET: 25; 100 TABLET ORAL at 20:26

## 2021-02-28 RX ADMIN — CARBIDOPA AND LEVODOPA 1 TABLET: 25; 100 TABLET ORAL at 16:19

## 2021-02-28 RX ADMIN — ACETAMINOPHEN 650 MG: 325 TABLET ORAL at 10:35

## 2021-02-28 RX ADMIN — Medication 10 ML: at 08:51

## 2021-02-28 RX ADMIN — ACETAMINOPHEN 650 MG: 325 TABLET ORAL at 14:37

## 2021-02-28 RX ADMIN — BISACODYL 10 MG: 5 TABLET, COATED ORAL at 08:51

## 2021-02-28 RX ADMIN — HYDROCODONE BITARTRATE AND ACETAMINOPHEN 1 TABLET: 5; 325 TABLET ORAL at 20:25

## 2021-02-28 RX ADMIN — SODIUM CHLORIDE: 9 INJECTION, SOLUTION INTRAVENOUS at 06:51

## 2021-02-28 RX ADMIN — DOCUSATE SODIUM 50 MG AND SENNOSIDES 8.6 MG 2 TABLET: 8.6; 5 TABLET, FILM COATED ORAL at 08:51

## 2021-02-28 ASSESSMENT — PAIN - FUNCTIONAL ASSESSMENT
PAIN_FUNCTIONAL_ASSESSMENT: PREVENTS OR INTERFERES SOME ACTIVE ACTIVITIES AND ADLS

## 2021-02-28 ASSESSMENT — PAIN DESCRIPTION - PROGRESSION
CLINICAL_PROGRESSION: NOT CHANGED

## 2021-02-28 ASSESSMENT — PAIN DESCRIPTION - LOCATION
LOCATION: HEAD

## 2021-02-28 ASSESSMENT — PAIN SCALES - GENERAL
PAINLEVEL_OUTOF10: 5
PAINLEVEL_OUTOF10: 10

## 2021-02-28 ASSESSMENT — PAIN DESCRIPTION - PAIN TYPE: TYPE: ACUTE PAIN

## 2021-02-28 ASSESSMENT — PAIN DESCRIPTION - ONSET
ONSET: ON-GOING
ONSET: ON-GOING

## 2021-02-28 ASSESSMENT — PAIN DESCRIPTION - DESCRIPTORS: DESCRIPTORS: ACHING

## 2021-02-28 ASSESSMENT — PAIN DESCRIPTION - FREQUENCY
FREQUENCY: CONTINUOUS
FREQUENCY: CONTINUOUS

## 2021-02-28 NOTE — PROGRESS NOTES
Physical Therapy  Physical Therapy Initial Assessment     Name: Yefri Chavez  : 1945  MRN: [id number]    Referring Provider: Patricia Robles MD    Date of Service: 2021    Evaluating PT: Abdelrahman Bear, PT, DPT, [id number]    Room #: 3514/6674-Z  Diagnosis: SDH  PMHx/PSHx: OA, HLD, depression, anxiety, osteoporosis, dementia, Parkinson's disease, mild mental retardation, asthma  Precautions: Fall risk, SDH, dementia, tremor, TSM, alarm    SUBJECTIVE:    Pt is poor historian and unable to provide social hx/PLOF. Per sister, pt was admitted from [place] [address] but lives at an [facility]. Pt was only at [place] [address] for 1 day before falling and being readmitted to the hospital. Pt ambulated with [employer] prior to admission. Pt was mostly independent for all mobility and ADLs until recently. Pt has hx of frequent falls. OBJECTIVE:   Initial Evaluation  Date: 21 Treatment Date: Short Term/ Long Term   Goals   AM-PAC 6 Clicks 04/92     Was pt agreeable to Eval/treatment? Yes     Does pt have pain? No current complaints of pain     Bed Mobility  Rolling: NT  Supine to sit: Mod A  Sit to supine: Mod A  Scooting: Mod A to EOB  Rolling: SBA  Supine to sit: SBA  Sit to supine: SBA  Scooting: SBA   Transfers Sit to stand: Min A  Stand to sit: Mod A  Stand pivot: Mod A with [employer]  Sit to stand: SBA  Stand to sit: SBA  Stand pivot: SBA with [employer]   Ambulation   12 feet x2 with [employer] with Mod A  >50 feet with [employer] with SBA   Stair negotiation: ascended and descended NT  NA   ROM BUE: Refer to OT note  BLE: WFL     Strength BUE: Refer to OT note  BLE: NT     Balance Sitting EOB: SBA  Dynamic Standing: Mod A with [employer]  Sitting EOB: Supervision  Dynamic Standing: SBA with [employer]     Pt is A & O x: 1 to person. Pt is able to follow simple, one step commands but pt is delayed. Sensation: NT  Edema: NT    Patient education  Pt educated on PT role in acute care setting.     Patient response to education:   Pt verbalized understanding Pt demonstrated skill Pt requires further education in this area   No NA Yes     ASSESSMENT:    Comments:   Pt was in bed with sister present upon room entry, agreeable to PT evaluation. Pt is very confused and minimally verbal. Pt's sister reports she is at baseline mentation. Pt is able to follow simple commands but has delayed processing. Pt required assistance for trunk mobility and scoot to EOB during supine to sit transfer. Pt ambulated short distance to/from commode. Gait is extremely slow with small steps noted. Pt has difficulty with initiation of movement. Gait speed improved as session continued. Pt attempts to abandon [employer] when pivoting to surfaces. Pt required increased assistance for stand to sit transfer from commode. Pt ambulated back to bed and was assisted to supine. Pt was left in bed with all needs met at conclusion of session. Treatment:  Patient practiced and was instructed in the following treatment:     Therapeutic activities: Pt completed all therapeutic activities noted above. Pt was cued for technique during supine to sit transfer. Pt was cued for hand placement during sit <> stand transfers. Pt completed multiple transfers from surfaces of varying heights (EOB x1, commode x1). Pt was cued for [employer] technique/approximation and to take bigger steps when ambulating. Dynamic sitting balance was challenged on commode as pt sat for 10+ minutes. Pt's/family goals:   1. To return to [facility]. Patient and or family understand(s) diagnosis, prognosis, and plan of care. Partial.    PLAN:    Current Treatment Recommendations   [x] Strengthening     [] ROM   [x] Balance Training   [x] Endurance Training   [x] Transfer Training   [x] Gait Training   [] Stair Training   [] Positioning   [x] Safety and Education Training   [x] Patient/Caregiver Education   [] HEP  [] Other     PT care will be provided in accordance with the objectives noted above.  Exercises and functional mobility practice will be used as well as appropriate assistive devices or modalities to obtain goals. Patient and family education will also be administered as needed. Frequency of treatments: 2-5x/week x 3-5 days. Time in: 0731  Time out: 0801    Total Treatment Time 25 minutes     Evaluation Time includes thorough review of current medical information, gathering information on past medical history/social history and prior level of function, completion of standardized testing/informal observation of tasks, assessment of data and education on plan of care and goals.     CPT codes:  [] Low Complexity PT evaluation 46701  [x] Moderate Complexity PT evaluation 17470  [] High Complexity PT evaluation 41563  [] PT Re-evaluation 53535  [] Gait training 98750 0 minutes  [] Manual therapy 80949 0 minutes  [x] Therapeutic activities 60597 25 minutes  [] Therapeutic exercises 26454 0 minutes  [] Neuromuscular reeducation 69906 0 minutes     Nawaf Peterson, PT, DPT  [id number]

## 2021-02-28 NOTE — CONSULTS
Isra Al is a 70 y.o. female       Chief Complaint   Patient presents with    Head Injury     fell at [facility] today and has SDH, transfer from [address] [place] [address]. HPI:  82-year-old woman with history of Parkinson's disease and PE on anticoagulation presents after a fall found to have falcine subdural hematoma. Neurology was consulted for underlying Parkinson's disease. Patient is on home Sinemet which she does endorse improves her tremor. It was held since admission and she has significant tremor of bilateral arms and head. HPI  Prior to Visit Medications    Medication Sig Taking? Authorizing Provider   carbidopa-levodopa (SINEMET)  MG per tablet Take 1 tablet by mouth 3 times daily Yes Historical Provider, MD   cetirizine (ZYRTEC ALLERGY) 10 MG tablet Take 10 mg by mouth every morning Yes Historical Provider, MD   Sod Fluoride-Potassium Nitrate (PREVIDENT 5000 SENSITIVE) 1.1-5 % PSTE Place onto teeth Yes Historical Provider, MD   nystatin (MYCOSTATIN) 351503 UNIT/GM cream Apply topically 3 times daily Yes Historical Provider, MD   magnesium hydroxide (MILK OF MAGNESIA) 400 MG/5ML suspension Take 30 mLs by mouth as needed for Constipation  Yes Historical Provider, MD   vitamin E 600 UNITS capsule Take 600 Units by mouth every morning  Yes Historical Provider, MD   divalproex (DEPAKOTE ER) 500 MG extended release tablet Take 1,000 mg by mouth nightly  Yes Historical Provider, MD   clonazePAM (KLONOPIN) 0.5 MG tablet Take 0.25 mg by mouth 2 times daily  .  Yes Historical Provider, MD   risperiDONE (RISPERDAL) 2 MG tablet Take 2 mg by mouth nightly Yes Historical Provider, MD   docusate sodium (COLACE) 100 MG capsule Take 100 mg by mouth every morning  Yes Historical Provider, MD   Calcium Carb-Cholecalciferol (CALCIUM + D3) 600-200 MG-UNIT TABS Take 1 tablet by mouth every morning  Yes Historical Provider, MD   citalopram (CELEXA) 40 MG tablet Take 40 mg by mouth nightly  Yes Historical Provider, MD simvastatin (ZOCOR) 40 MG tablet Take 40 mg by mouth nightly. Yes Historical Provider, MD   acetaminophen (TYLENOL) 650 MG CR tablet Take 650 mg by mouth 2 times daily. Yes Historical Provider, MD   megestrol acetate (MEGACE) 400 MG/10ML SUSP Take 400 mg by mouth daily  Historical Provider, MD     Social History     Tobacco Use    Smoking status: Never Smoker    Smokeless tobacco: Never Used   Substance Use Topics    Alcohol use: No    Drug use: No     Family History   Family history unknown: Yes     Past Surgical History:   Procedure Laterality Date    HERNIA REPAIR      TONSILLECTOMY       Past Medical History:   Diagnosis Date    Anxiety     Arthritis     Asthma     Dementia ([facility] [address]     Depression     Depression     Hyperlipidemia     Mild mental retardation     Osteoporosis     Parkinson disease ([facility] [address]      Review of Systems    As stated abole        Objective:   BP (!) 117/58   Pulse 97   Temp 98.4 °F (36.9 °C) (Temporal)   Resp 17   Ht 5' 5\" (1.651 m)   Wt 104 lb (47.2 kg)   SpO2 90%   BMI 17.31 kg/m²     Physical Exam  HENT:      Head: Normocephalic and atraumatic. Mouth/Throat:      Mouth: Mucous membranes are moist.      Pharynx: Oropharynx is clear. Eyes:      Extraocular Movements: Extraocular movements intact. Conjunctiva/sclera: Conjunctivae normal.   Neck:      Musculoskeletal: Neck rigidity present. Cardiovascular:      Rate and Rhythm: Normal rate and regular rhythm. Skin:     General: Skin is warm and dry. Neurological:      Mental Status: She is alert. Neurological Exam  Mental Status  Alert. Oriented only to person, place and situation. Significant latency in responses but oriented to the location and situation, thought the president was Obama. .    Cranial Nerves  CN II: Visual fields full to confrontation. CN III, IV, VI: Extraocular movements intact bilaterally.   CN V: Facial sensation is normal.  CN VII: Full and symmetric facial movement. CN IX, X: Palate elevates symmetrically  CN XI: Dystonic head tremor with null point left of center. Motor  Decreased muscle bulk throughout. Increased muscle tone. The following abnormal movements were seen: Prominent rest tremor bilateral hands. Diffusely weak but nonfocal. Antigravity x4. .    Sensory  Light touch is normal in upper and lower extremities. Pinprick is normal in upper and lower extremities. Laboratory/Radiology:     CBC with Differential:    Lab Results   Component Value Date    WBC 5.9 02/28/2021    RBC 3.24 02/28/2021    HGB 10.9 02/28/2021    HCT 32.3 02/28/2021     02/28/2021    MCV 99.7 02/28/2021    MCH 33.6 02/28/2021    MCHC 33.7 02/28/2021    RDW 14.0 02/28/2021    NRBC 0.0 02/24/2021    LYMPHOPCT 37.5 02/28/2021    MONOPCT 15.9 02/28/2021    MYELOPCT 1.8 02/24/2021    BASOPCT 1.4 02/28/2021    MONOSABS 0.93 02/28/2021    LYMPHSABS 2.20 02/28/2021    EOSABS 0.05 02/28/2021    BASOSABS 0.08 02/28/2021     CMP:    Lab Results   Component Value Date     02/28/2021    K 4.0 02/28/2021     02/28/2021    CO2 26 02/28/2021    BUN 12 02/28/2021    CREATININE 0.5 02/28/2021    GFRAA >60 02/28/2021    LABGLOM >60 02/28/2021    GLUCOSE 79 02/28/2021    PROT 5.8 02/27/2021    LABALBU 3.2 02/27/2021    CALCIUM 8.9 02/28/2021    BILITOT 0.4 02/27/2021    ALKPHOS 28 02/27/2021    AST 21 02/27/2021    ALT 17 02/27/2021       CT head:  As stated above  I independently reviewed the labs and imaging studies at today's appointment. Assessment:       Parkinson's disease with fall and subdural hematoma. Plan:     Restart Sinemet we will try to increase frequency. Follow-up with neurology outpatient. May benefit from Botox injection for dystonic head tremor. Neurology will be available as needed.       Thea March  2:55 PM  2/28/2021

## 2021-02-28 NOTE — CONSULTS
[address]                  [address] [place],  [facility]                                  CONSULTATION    PATIENT NAME: Cony Beckman                     :        1945  MED REC NO:   [id number]                            ROOM:       5538  ACCOUNT NO:   [de-identified]                           ADMIT DATE: 2021  PROVIDER:     Megan Slaughter MD    CONSULT DATE:  2021    REASON FOR CONSULT:  Subdural hematoma. HISTORY OF PRESENT ILLNESS:  The patient is a 72-year-old lady who fell  yesterday at nursing facility. She had been discharged to [facility]. Of note, she was diagnosed with DVT and was  placed on Eliquis and after a fall, she had a CT scan of her head that  showed a subdural hematoma. As a result of that, Neurosurgery Service  was consulted. Of note, she does have Parkinson's disease and some  dementia, so the rest of the interview and examination was difficult to  complete and the history is obtained from the medical record. PAST MEDICAL HISTORY:  Positive for arthritis, hyperlipidemia,  depression, anxiety, osteoporosis, dementia, asthma, and Parkinson's  disease. PAST SURGICAL HISTORY:  Positive for tonsillectomy, hernia repair. FAMILY HISTORY:  Noncontributory. SOCIAL HISTORY:  Negative for tobacco or alcohol use. ALLERGIES:  Include PENICILLIN. HOME MEDICATIONS:  Include Sinemet. REVIEW OF SYSTEMS:  I am unable to complete a 14-point review of systems  because of the patient's current medical condition. PHYSICAL EXAMINATION:  VITAL SIGNS:  She is currently afebrile with a T-current of 36.3 degrees  Celsius, pulse 83, blood pressure is 117/64, respiratory rate is 18. GENERAL:  She is resting in bed, does not appear to be in any acute  distress, appears her stated age. HEENT:  Her head is normocephalic and atraumatic. Pupils are 3 to 2 mm  and reactive.   She has no drainage out of her eyes, ears, nose, or  throat. SKIN:  Her skin is warm and dry. MUSCULOSKELETAL:  She has got increased rigidity with decreased range of  motion of bilateral upper and lower extremities and does have a  prominent tremor. ABDOMEN:  Soft, nontender, nondistended. RESPIRATORY:  She is not using any accessory muscles of respiration. NEUROLOGIC:  On rest of her neurologic exam, she is awake and alert,  disoriented to place and time. Cranial nerves II through XII are intact  bilaterally. Motor exam reveals at least 4/5 strength in the bilateral  upper and lower extremities, with again evidence of increased rigidity  and tremor. Sensation is grossly intact to light touch. LABORATORY VALUE:  She has a white blood cell count of 5.9, hemoglobin  10.9, hematocrit 32.3, platelet count is 395. Sodium is 140, potassium  is 4.0, BUN is 12, creatinine is 0.5. DIAGNOSTIC DATA:  CT scan of her head shows left frontal and right  parietal interhemispheric subdural hematoma. ASSESSMENT AND PLAN:  The patient is a 79-year-old lady with subdural  hematoma. She is neurologically stable. From a neurosurgical  standpoint, no intervention is required. She is to be off all  antiplatelet, anticoagulation therapy. She is scheduled to have an IVC  filter placed for her DVT. We will continue to monitor her neurologic  status.         Herber Salas MD    D: 02/28/2021 7:09:06       T: 02/28/2021 7:10:58     [email or web address]  Job#: [id number]     Doc#: [id number]    CC:

## 2021-02-28 NOTE — PROGRESS NOTES
Occupational Therapy  OCCUPATIONAL THERAPY INITIAL EVALUATION      Date:2021  Patient Name: Alian Sheppard  MRN: [id number]  : 1945  Room: [address] [place] [address]    Evaluating OT: Saira Holley, OTR/L #[id number]    Referring physician: Carollee Gosselin, MD    AM-PAC Daily Activity Raw Score:   Recommended Adaptive Equipment: TBD     Reason for Admission/Diagnosis: Fall, subdural hematoma   Pertinent Medical History/Surgery: Parkinson's disease, anxiety, arthritis, asthma, dementia, depression, HLD, osteoporosis, mild MR    Patient with recent hospitalization and discharge 21 due to unable to ambulate with R LE DVT. Precautions:  Falls, tremors, impaired cognition, alarm, TSM, TAPS     Home Living:Patient is a limited historian. Per chart and patient's sister, patient is a resident at [address] [facility]. Prior to this admission, patient was at [facility] for less than one day before return to hospital.     Equipment owned: walker  Prior Level of Function:   Assist with ADLs. Patient's sister is also a resident of [facility] and assists with ADLs as needed with additional assistance from staff. Assist with IADLs. Patient was using a walker for functional mobility. Patient able to complete functional mobility from room<>dining room for meals.    Driving: no                             Occupation: not employed  Leisure:not stated    Pain Level: No pain reported  Cognition: A&O: self:yes, place:hospital only, time: month and year, situation:no  Communication: Impaired, flat affect with increased processing time required and minimal verbal responses  Follows 1 step directions   Memory:  fair    Sequencing:  fair -   Problem solving:  fair -   Judgement/safety:  fair -     Functional Assessment:   Initial Eval Status  Date: 21 Treatment Status  Date: Short Term Goals=LTG  Treatment frequency: PRN 2-4 x/week   Feeding Minimal Assist  Completed supine with HOB elevated, built-up foam handled provided   Modified [place]    Grooming Minimal Assist  Completed hand hygiene x2 using  semi-supine in bed   Modified [place]     UB Dressing Moderate Assist   Minimal Assist    LB Dressing Maximal Assist   Minimal Assist    Bathing Maximal Assist   Minimal Assist    Toileting Moderate Assist  Completed toileting x2 using standard commode   Supervision    Bed Mobility  Supine to sit: Min A   Sit to supine: Min A  Supine to sit: Mod I   Sit to supine: Mod I   Functional Transfers Sit to stand: Min A  Stand to sit: Min A  Toilet Transfer: Mod A  Using fww/grab bars  Supervision using fww   Functional Mobility Min A  Completed bed<>bathroom functional mobility x2 using fww  Supervision using fw  Functional household distance   Balance Sitting:     Static:SBA    Dynamic:NT  Standing: Min A     Activity Tolerance Fair  Fair+   Visual/  Perceptual Glasses: no         Safety       Fair-                  Fair+     Upper Extremities:   Hand Dominance: Right [x]  Left []      ROM and Strength Coordination Short Term Goals=LTG   Right UE Active ROM:  WFL     Strength: 4-/5     Strength: 4-/5 Fine/gross Motor Coordination: moderate impairment  Opposition:moderate impairment    -tremors observed      Left UE Active ROM:  WFL       Strength: 4-/5     Strength: 4-/5 Fine/gross Motor Coordination: moderate impairment  Opposition:moderate impairment    -tremors observed        Hearing: WFL  Sensation:  No c/o numbness or tingling  Tone:  WFL  Edema: none observed B UE                            Comments: Upon arrival, patient sitting edge of bed with assistance and agreeable to session with patient's sister at bedside. OT evaluation performed with education provided regarding the purpose and benefits of OT session, along with mobility and I/ADL completion.  Patient demonstrates functional limitations with ADLs/ functional mobility due to weakness, decreased activity tolerance, impaired cognition, impaired balance, and tremors. At end of session, patient semi-supine in bed with call light and phone within reach, along with all lines and tubes intact. Alarm on. Treatment: OT treatment provided this date includes:    ADL training-  Instruction/training on safety and adapted techniques for completion of ADLs: Toileting completed x2 using standard commode. Mod A overall for toileting. Patient able to assist with front mimi-hygiene while seated with CGA for balance. Additional assistance required for rear-mimi-hygiene while standing with min A to maintain balance. Min A to complete hand hygiene x2 using  while seated/semi-supine for safety. Therapist assessed patient's performance with self-feeding. Min A overall to complete with assistance for scooping and positioning utensil. Patient issued built-up foam handle to improve grasp and performance with . Patient used R UE to complete self feeding with patient supine with HOB max elevated for safety. Increased time required with verbal cues for continuation and safety.   Mobility training-  Instruction/training on safety and improved independence. Min A sit<>stand, with mod A sit<>stand from toilet due to low surface height. Verbal/tactile cues for hand placement and safety using fww/grab bars. Patient completed functional mobility bed<>bathroom x2 with min A overall using fww and  increased time and effort required with verbal/tactile cues for initiation, sequencing, positioning, technique, continuation, and safety with navigation.   Skilled monitoring of vitals-  Skilled monitoring of the patient's response throughout treatment. SpO2 during activity 98%, patient on RA; -130 bpm      Patient would  benefit from continued skilled OT  to maximize functional outcomes as they relate to I/ADLs and functional mobility. Eval Complexity: Mod  · Evaluation Complexity: Mod Complexity  ?  History: Expanded review of medical records and additional review of physical, cognitive, or psychosocial history related to current functional performance  ? Exam: 5+ performance deficits  ? Assistance/Modification: mod/max assistance or modifications required to perform tasks. May have comorbidities that affect occupational performance.       Assessment of current deficits   Functional mobility [x]  ADLs [x] Strength [x]  Cognition [x]  Functional transfers  [x] IADLs [x] Safety Awareness [x]  Endurance/Activity tolerance [x]  Fine Motor Coordination [x] Balance [x] Vision/perception [] Sensation []   Gross Motor Coordination [x] ROM [] Delirium []                  Motor Control []    Plan of Care:  OT 2-4x/week for 1-2 weeks PRN   [x] ADL retraining/modified techniques, along with assistive device recommendations to maximize patient safety and performance with self-care while maintaining precautions   [x]Balance retraining/tolerance tasks for facilitation of postural control with dynamic challenges during ADLs and functional activities   [x] Delirium Prevention/treatment to maximize functional outcomes   [x] Cognitive Re-Training/ executive function skills for safe participation in ADLs/IADLs, along with functional activities   [x] Energy conservation techniques/Strategies to improve activity tolerance      [x] Environmental modifications for safe mobility and completion of ADLs    [x] Functional mobility training/DME recommendations for increased performance, safety and fall prevention  while maintaining precautions     [x] Functional transfer/mobility training/DME recommendations for increased performance, safety and fall prevention while maintaining precautions           []Neuromuscular re-education: facilitation of righting/equilibrium reactions, midline orientation, scapular stability/mobility, normalization muscle tone and facilitation active functional movement/Attention   [x] Patient/Family education to increase safety and functional independence   [x] Positioning to improve functional independence, safety, and skin integrity   []Sensory re-education techniques to improve extremity awareness, maintain skin integrity and improve hand function         [x]Splinting/positioning needs to maintain joint/skin integrity and prevent contractures       [x] Therapeutic Activity to improve activity tolerance for functional activities and ADLs    [x]Therapeutic Exercise to improve UE strength and activity tolerance for functional transfers and ADLs   [] Visual/Perceptual retraining to improve body awareness and safety during functional activities and ADLs   []      Rehab Potential:  Good for established goals    Patient / Family Goal: Patient's sister would like patient to return to [facility]. Evaluation Time includes thorough review of current medical information, gathering information on past medical history/social history and prior level of function, completion of standardized testing/informal observation of tasks, assessment of data and education on plan of care and goals. Patient and/or family were instructed on functional diagnosis, prognosis/goals and OT plan of care. Patient demonstrated fair- understanding. Patient's sister demonstrated good understanding. Time In: 07:48  Time Out: 08:43  Total Session Time: 54 minutes  Total Treatment Time: 38 minutes    Min Units   OT Eval Low 72033       OT Eval Medium 97166  X 1   OT Eval High 22668       OT Re-Eval [id number]       Therapeutic Ex 99194       Therapeutic Activities 55243  13  1   ADL/Self Care 09397  25  2   Orthotic Management 76149       Neuro Re-Ed 97202       Non-Billable Time           [] Malnutrition indicators have been identified and nursing has been notified to ensure a dietitian consult is ordered.       Mod OT Evaluation + 38  treatment minutes    LUDWIG Blue/JUSTIN #[id number]

## 2021-02-28 NOTE — DISCHARGE SUMMARY
Physician Discharge Summary     Patient ID:  Ricardo Laureano  [id number]  70 y.o.  1945    Admit date: 2/27/2021    Discharge date and time: 3/1/2021  8:57 PM     Admitting Physician: Emanuel Epps MD     Admission Diagnoses: SDH (subdural hematoma) ([facility] [address] [S06.5X9A]    Discharge Diagnoses: Active Problems:    SDH (subdural hematoma) (HCC)    Acute deep vein thrombosis (DVT) of right peroneal vein (HCC)    Anticoagulated    Parkinson's disease ([facility] [address]  Resolved Problems:    * No resolved hospital problems. *      Admission Condition: fair    Discharged Condition: stable    Indication for Admission: Trauma, Iver Harry, [address] [place] [address] Course/Procedures/Operation/treatments:   2/27: 69 y/o F that sustained a fall from standing height and hit her head. Went to [facility] and found to have SDH then transferred to [facility] (1-). Admitted to med/surg, [facility] consulted, Vasc consulted, Neurology consulted, started Keppra. 2/28: Tert negative. Continue Keppra. Vascular wants repeat DVT ultrasounds in a few days and will only place an IVCf if new clots are found. Neurology resumed sinemet. [facility] 13/24.  3/1: tremors slightly improved with sinemet per patient. DC back to facility today. Consults:   IP CONSULT TO TRAUMA SURGERY  IP CONSULT TO NEUROSURGERY  IP CONSULT TO VASCULAR SURGERY  IP CONSULT TO NEUROLOGY  IP CONSULT TO SOCIAL WORK    Significant Diagnostic Studies:   Xr Pelvis (1-2 Views)    Result Date: 2/27/2021  EXAMINATION: ONE XRAY VIEW OF THE PELVIS 2/27/2021 7:31 am COMPARISON: None. HISTORY: ORDERING SYSTEM PROVIDED HISTORY: fall TECHNOLOGIST PROVIDED HISTORY: Reason for exam:->fall FINDINGS: Degenerative changes are present in the spine and pelvis. No bony destruction, dislocation or acute fracture identified. No acute bony abnormality.   MRI or CT would be useful if symptoms persist.    Xr Knee Left (3 Views)    Result Date: 2/27/2021  EXAMINATION: THREE XRAY VIEWS OF THE LEFT KNEE 2/27/2021 7:31 am COMPARISON: 10/01/2014 HISTORY: ORDERING SYSTEM PROVIDED HISTORY: fall, pain TECHNOLOGIST PROVIDED HISTORY: Reason for exam:->fall, pain FINDINGS: Moderately advanced tricompartmental degenerative changes are similar to previous. Probable geode in the proximal tibia is similar to previous. No joint effusion. No acute fracture. Similar chronic appearing findings. MRI would be useful if symptoms persist.    Ct Head Wo Contrast    Result Date: 2/27/2021  EXAMINATION: CT OF THE HEAD WITHOUT CONTRAST  2/27/2021 1:40 pm TECHNIQUE: CT of the head was performed without the administration of intravenous contrast. Dose modulation, iterative reconstruction, and/or weight based adjustment of the mA/kV was utilized to reduce the radiation dose to as low as reasonably achievable. COMPARISON: Today at 0927 hours HISTORY: ORDERING SYSTEM PROVIDED HISTORY: [facility] TECHNOLOGIST PROVIDED HISTORY: Has a \"code stroke\" or \"stroke alert\" been called? ->No Reason for exam:->[facility] Decision Support Exception->Emergency Medical Condition (MA) What reading provider will be dictating this exam?->[facility] FINDINGS: Stable left frontal subdural hematoma measures approximately 2 mm in thickness. Stable subdural hemorrhage along anterior falx within interhemispheric fissure. Minimal subdural hemorrhage overlies right parietal lobe measuring 2 mm in thickness. No new intracranial hemorrhage. No intracranial edema. Areas of chronic microvascular ischemia are present in periventricular white matter. Stable left frontal, right parietal, and parafalcine subdural hematomas. No new intracranial hemorrhage or edema.     Ct Head Wo Contrast    Result Date: 2/27/2021  EXAMINATION: CT OF THE HEAD WITHOUT CONTRAST  2/27/2021 9:25 am TECHNIQUE: CT of the head was performed without the administration of intravenous contrast. Dose modulation, iterative reconstruction, and/or weight based adjustment of the mA/kV was utilized to reduce the radiation dose to as low as reasonably tissue swelling. No acute abnormality of the cervical spine. Xr Chest Portable    Result Date: 2021  EXAMINATION: ONE XRAY VIEW OF THE CHEST 2021 10:23 am COMPARISON: 2021 HISTORY: ORDERING SYSTEM PROVIDED HISTORY: chest pain TECHNOLOGIST PROVIDED HISTORY: Reason for exam:->chest pain FINDINGS: Redemonstration of interstitial prominence in perihilar and infrahilar locations. No airspace opacity or pleural effusion. The heart is normal size. No pneumothorax. Redemonstration of interstitial prominence in perihilar locations which could suggest peribronchial inflammatory changes or pulmonary vascular congestion. No focal airspace opacity or pleural effusion. Us Dup Lower Extremity Right Raul    Result Date: 2021  Patient MRN:  [id number] : 1945 Age: 70 years Gender: Female Order Date:  2021 4:25 PM EXAM: US DUP LOWER EXTREMITY RIGHT RAUL NUMBER OF IMAGES:  25 INDICATION:  Recent Right peroneal vein DVT, please re-evaluate Recent Right peroneal vein DVT, please re-evaluate What reading provider will be dictating this exam?->[facility] COMPARISON: 2021 Within the visualized vessels, there is no evidence for deep venous thrombosis There is good compressibility, there is good augmentation, there is good color flow.  Previously identified thrombus in the right peroneal vein is no longer seen    Within the visualized vessels there is no evidence for deep venous thrombosis      Discharge Exam:  Gen - no apparent distress   Neuro - Awake, alert, attentive    HEENT - PERRL 3mm   Lungs - non labored, BS clear b/l    Heart - RR    Abdomen - SNT  Spine -   Mild L spine tenderness   Ext- ROM decreased all ext, tremulous upper ext worse than lowers     Disposition: Analy Kelly    In process/preliminary results:  Outstanding Order Results     Date and Time Order Name Status Description    2021 1200 COVID-19 AMBULATORY Preliminary           Patient Instructions:   Discharge injuries or health problems that make it easy for you to fall. Loose rugs and furniture in walkways are among the dangers for many older people who have problems walking or who have poor eyesight. People who have conditions such as arthritis, osteoporosis, or dementia also must be careful not to fall. You can make your home safer with a few simple measures. Follow-up care is a key part of your treatment and safety. Be sure to make and go to all appointments, and call your doctor or nurse call line if you are having problems. It's also a good idea to know your test results and keep a list of the medicines you take. How can you care for yourself at home? Taking care of yourself   You may get dizzy if you do not drink enough water. To prevent dehydration, drink plenty of fluids, enough so that your urine is light yellow or clear like water. Choose water and other caffeine-free clear liquids. If you have kidney, heart, or liver disease and have to limit fluids, talk with your doctor before you increase the amount of fluids you drink.  Exercise regularly to improve your strength, muscle tone, and balance. Walk if you can. Swimming may be a good choice if you cannot walk easily.  Have your vision and hearing checked each year or any time you notice a change. If you have trouble seeing and hearing, you might not be able to avoid objects and could lose your balance.  Know the side effects of the medicines you take. Ask your doctor or pharmacist whether the medicines you take can affect your balance. Sleeping pills or sedatives can affect your balance.  Limit the amount of alcohol you drink. Alcohol can impair your balance and other senses.  Ask your doctor whether calluses or corns on your feet need to be removed. If you wear loose-fitting shoes because of calluses or corns, you can lose your balance and fall.  Talk to your doctor if you have numbness in your feet.     Preventing falls at Get out of a tub or shower with your strong side first.   Repair loose toilet seats and consider installing a raised toilet seat to make getting on and off the toilet easier.  Keep your washroom door unlocked while you are in the shower. Follow-up:  Trauma Clinic: [phone] option 2  [address] [facility] [address] [place]       MILD TRAUMATIC BRAIN INJURY OR CONCUSSION  A mild traumatic brain injury (TBI) is one that causes some degree of injury to the brain causing symptoms ranging from a brief period of confusion to loss of consciousness (being knocked out). There is no major bruising or bleeding in the brain but symptoms can last from hours to months depending on the severity of the injury. Family or friends need to observe any change in behavior for the next 48 hours. Delayed effects from head injury do occur occasionally and can be due to slow bleeding or swelling around the surface of the brain. These effects may occur even if the x-rays/CT scans were normal.  Please observe the following symptoms during the next 24-48 hours. CALL 911 if:   Pupils (black part in the center of the eye) are unequal in size, and this is new.  Seizure (convulsion).  Not responding to others/won't wake up or very hard to wake up   Faints (passes out)   Vomiting more than 3 times    Notify the TRAUMA CLINIC if any of the following symptoms occur:   Severe headache -- Mild headache may last for days. Report worsening pain or uncontrolled pain with prescribed medicine.  Numbness, tingling or weakness -- Present in arms or legs; unsteady walking.  Eye Changes/light sensation -- Vision problems; blurred or double-vision; unequal sized pupils.  Nausea/Vomiting -- Episodes of vomiting may occur initially after a head injury. Persistent vomiting or difficulty taking medication by mouth.    Increased Sleepiness -- Difficulty waking from sleep with increased confusion.  Dizziness -- Does not go away or occurs repeatedly. Vomiting may accompany dizziness.  Drainage -- Clear fluid or blood from the nose and ears.  Fever -- Temperature over 101 degrees.  Neck Pain. The First Four Weeks  The symptoms below are common after a mild brain injury. They usually get better on their own within a few weeks:    feeling tired or ?low   problems falling or staying asleep   feeling confused, poor concentration, or slow to answer questions   feeling dizzy, poor balance, or poor coordination   being sensitive to light   being sensitive to sounds   ringing in the ears   a mild headache, sometimes with nausea and/or vomiting   being irritable, having mood swings, or feeling somewhat sad or down  Contact the TRAUMA CLINIC if your symptoms are affecting your everyday activities. Remember that letting yourself get too tired can make your symptoms worse. Listen to your body: if doing a certain activity increases your symptoms, take a break from that activity. Build up the amount of time you do an activity and stay under the threshold of symptoms. Long term Effects (Post-Concussive Syndrome)    Notify physician if any of the following persists longer than 2-4 weeks.  Difficulty with concentration or attention (easily distracted)   Frequent headaches   Memory problems    Sensitivity to light    Sleeping difficulties      There is a higher risk of having a more serious head injury if:   Previous history of head injury or concussion   Taking medicine that thins your blood, or have a bleeding disorder   Have other neurologic (brain) problems   Have difficulty walking or frequent falls   Active in high impact contact sports, like soccer or football. Activities   Stay away from activities that could cause another head injury (like sports), until the doctor says it's okay.  A second blow to the head can cause more damage to the brain   Limit reading, television, video games, etc. the first 48 hours. Your brain needs to rest so that it can recover. You may find that it helps to take time off school or work.  Limit exposure to bright lights, loud noises, and crowds for the first 48 hours, as these can make your symptoms worse   Limit use of screens, such as an IPad, computer, cellphone, TV, etc, as these can make symptoms, especially headaches, worse. Work/School   It is recommended that you wait to return to work/school until after your follow-up appointment with trauma or your family doctor. If you are having no symptoms, please call for an earlier appointment   Some people find it hard to concentrate well so return to your normal activities slowly. Go back to work or school for half days at first, and increase as tolerated. Trauma services can help you with a graduated schedule.  If you feel comfortable doing so, tell work or school about your concussion. You may have to adjust your activities, depending on your job or school demands. Rest and Sleep   Rest for the first 24 hours; it's one of the best things to help your brain recover. It's okay to sleep if you want   You don't have to be woken up every few hours. If someone does wake you up, you should awaken easily.  Do some light physical activity (housework) or light exercise (walking, stationary bike) as soon as you can tolerate the movement. Strenuous exercise (such as jogging or weight lifting) can make your concussion symptoms worse or last longer. Diet   Return to a normal diet as tolerated, you may want to start with liquids first   Eat healthy meals (including breakfast) and snacks throughout the day as your brain heals. Managing Pain   Tylenol or Ibuprofen are the best meds to take for headaches.  Your doctor may have prescribed you medications if your headaches were severe or you have other injuries. Please take as directed.   Driving   Your ability to concentrate and react quickly might be affected by the concussion. Please contact trauma services for advice on when to resume driving.  Do not drive if you're concerned about vision problems, slowed thinking, slowed reaction time, reduced attention or poor judgment.  Wear sunglasses even during winter if ebonie while driving. The bright light may induce a headache. Alcohol use/Drug use   Don't drink alcohol or use recreational drugs as they may make you feel worse and/or hide the warning signs. Follow-up:  Trauma Clinic: [phone] -- press option [address]  [place], [address] [place] [address]         Follow up:   Luisa Bone MD  [address] [facility] [address]  [phone]    Schedule an appointment as soon as possible for a visit in 1 week  DVT    [address] [phone]    parkinsons, dystonic head tremor. [facility] [address] [id number]  [address] [facility] [address]  [address]  [phone]  Call  As needed    Luisa Bone MD  [address]   [facility] [address]  [phone]      needs repeat lower extremity ultrasound to assess for DVT in 5-7 days       Signed:  Bridger Calix MD  3/2/2021  5:40 AM

## 2021-02-28 NOTE — PROGRESS NOTES
TRAUMA TERTIARY    Admit Date: 2/27/2021    [facility]    CC:    Chief Complaint   Patient presents with    Head Injury     fell at [facility] today and has SDH, transfer from [address] [place] [address]. Alcohol pre-screening:  How many times in the past year have you had 4-5 or more drinks in a day?  none    Subjective:       Pt doing okay. Denies any new pain. No numbness or tingling. Pt denies any changes in vision, nausea, or vomiting. Objective:     Patient Vitals for the past 8 hrs:   BP Temp Temp src Pulse Resp SpO2   02/28/21 0337 117/64 97.3 °F (36.3 °C) Temporal 83 18 95 %   02/28/21 0000 114/69 96.8 °F (36 °C) Infrared 74 17 94 %       No intake/output data recorded. No intake/output data recorded. Radiology:  US DUP LOWER EXTREMITY RIGHT RAUL   Final Result   Within the visualized vessels there is no evidence for deep venous   thrombosis      CT HEAD WO CONTRAST   Final Result   Stable left frontal, right parietal, and parafalcine subdural hematomas. No   new intracranial hemorrhage or edema. PHYSICAL EXAM:   GCS:  4 - Opens eyes on own   6 - Follows simple motor commands  5 - Alert and oriented    Pupil size: Left 4 mm     Right 4 mm  Pupil reaction: Yes  Wiggles fingers: Left Yes     Right Yes  Wiggles toes: Left Yes     Right Yes  Plantar flexion: Left normal     Right normal    GENERAL:  NAD. A&Ox3. HEAD:  Normocephalic, atraumatic. LUNGS:  No increased work of breathing. CTAB. CARDIOVASCULAR: RR  ABDOMEN:  Soft, non-distended, non-tender. No guarding, rigidity, rebound. EXTREMITIES:  MAEx4. No LE edema. Atraumatic.   SKIN:  Warm and dry      Spine:       Spine Tenderness ROM   Cervical 0 /10 Normal   Thoracic 0 /10 Normal   Lumbar 0 /10 Normal     Musculoskeletal:    Joint Tenderness Swelling/Deformity ROM   Right shoulder absent absent normal   Left shoulder absent absent normal   Right elbow absent absent normal   Left elbow absent absent normal   Right wrist absent absent normal   Left wrist absent absent normal   Right hand grasp absent absent normal   Left hand grasp absent absent normal   Right hip absent absent normal   Left hip absent absent normal   Right knee absent absent normal   Left knee absent absent normal   Right ankle absent absent normal   Left ankle absent absent normal   Right foot absent absent normal   Left foot absent absent normal         CONSULTS: Neurosurgery, Vascular, Neurology      Active Problems:    SDH (subdural hematoma) (HCC)    Acute deep vein thrombosis (DVT) of right peroneal vein (HCC)    Anticoagulated    Parkinson's disease ([facility] [address]  Resolved Problems:    * No resolved hospital problems. *        Assessment/Plan:     Neuro:  Parkinson's dementia, neurology recs pending. SDH, stable on repeat, NSG recs pending, continue Keppra. AES98. Pain control. CV: R peroneal DVT, previously on anticoagulation but held due to SDH, Vasc recs IVC filter today. Pulm: No acute issues. Encourage IS/SMI. GI: NPO for procedure, IVFs, okay for diet after. Bowel regimen. Zofran PRN. Renal: No acute issues. Endocrine: No acute issues. MSK: No acute issues. PT/OT. Heme: Acute blood loss anemia secondary to trauma. Monitor. ID: No acute issues.     Pain/Analgesia: Tylenol and Norco  Bowel Regimen: Dulcolax, senokot  DVT PPx: SCDs  GI PPx: None     Code status:  Full Code    Disposition:  Pending consultants    Juli Willoughby MD  Resident, PGY-1  2/28/2021  5:59 AM

## 2021-02-28 NOTE — PROGRESS NOTES
Vascular Surgery Progress Note    Pt is being seen in f/u today regarding right peroneal DVT    Subjective  Pt s/e.  No complaints at this time    Current Medications:    sodium chloride 70 mL/hr at 02/28/21 0651      sodium chloride flush, ondansetron **OR** ondansetron, HYDROcodone 5 mg - acetaminophen    ceFAZolin  2,000 mg Intravenous See Admin Instructions    sodium chloride flush  10 mL Intravenous 2 times per day    acetaminophen  650 mg Oral Q4H    bisacodyl  10 mg Oral Daily    sennosides-docusate sodium  2 tablet Oral BID    levETIRAcetam  500 mg Oral BID        PHYSICAL EXAM:    BP (!) 117/58   Pulse 97   Temp 98.4 °F (36.9 °C) (Temporal)   Resp 17   Ht 5' 5\" (1.651 m)   Wt 104 lb (47.2 kg)   SpO2 90%   BMI 17.31 kg/m²   No intake or output data in the 24 hours ending 02/28/21 1047       Gen: awake, alert   CVS: RR, normotensive  Resp: No increased work of breathing  Abd: Soft, non-tender, non-distended  R LE: Wounds present, multicolored pigmented rash over legs, distal pulses intact  L LE: Wounds present multicolored pigmented rash over legs, distal pulses intact    LABS:    Lab Results   Component Value Date    WBC 5.9 02/28/2021    HGB 10.9 (L) 02/28/2021    HCT 32.3 (L) 02/28/2021     02/28/2021    PROTIME 13.9 (H) 02/27/2021    INR 1.2 02/27/2021    APTT 30.7 02/27/2021    K 4.0 02/28/2021    BUN 12 02/28/2021    CREATININE 0.5 02/28/2021       A/P  70 y.o. female with suspected R peroneal DVT, frequent falls, and SDH    -US not demonstrating DVT  -Cancelling IVCF  -Recommend repeat US in 5 days  -If DVT has developed than we would pursue IVCF    Festus Villafana

## 2021-03-01 VITALS
HEIGHT: 65 IN | OXYGEN SATURATION: 96 % | HEART RATE: 88 BPM | BODY MASS INDEX: 17.33 KG/M2 | WEIGHT: 104 LBS | RESPIRATION RATE: 18 BRPM | DIASTOLIC BLOOD PRESSURE: 55 MMHG | SYSTOLIC BLOOD PRESSURE: 108 MMHG | TEMPERATURE: 98.3 F

## 2021-03-01 LAB
SARS-COV-2, NAAT: NOT DETECTED
URINE CULTURE, ROUTINE: NORMAL

## 2021-03-01 PROCEDURE — 6370000000 HC RX 637 (ALT 250 FOR IP): Performed by: PSYCHIATRY & NEUROLOGY

## 2021-03-01 PROCEDURE — 99232 SBSQ HOSP IP/OBS MODERATE 35: CPT | Performed by: SURGERY

## 2021-03-01 PROCEDURE — 97535 SELF CARE MNGMENT TRAINING: CPT

## 2021-03-01 PROCEDURE — 97530 THERAPEUTIC ACTIVITIES: CPT

## 2021-03-01 PROCEDURE — 6360000002 HC RX W HCPCS: Performed by: STUDENT IN AN ORGANIZED HEALTH CARE EDUCATION/TRAINING PROGRAM

## 2021-03-01 PROCEDURE — 2580000003 HC RX 258: Performed by: STUDENT IN AN ORGANIZED HEALTH CARE EDUCATION/TRAINING PROGRAM

## 2021-03-01 PROCEDURE — 6370000000 HC RX 637 (ALT 250 FOR IP): Performed by: STUDENT IN AN ORGANIZED HEALTH CARE EDUCATION/TRAINING PROGRAM

## 2021-03-01 PROCEDURE — 99232 SBSQ HOSP IP/OBS MODERATE 35: CPT | Performed by: NEUROLOGICAL SURGERY

## 2021-03-01 PROCEDURE — 87635 SARS-COV-2 COVID-19 AMP PRB: CPT

## 2021-03-01 RX ORDER — LEVETIRACETAM 500 MG/1
500 TABLET ORAL 2 TIMES DAILY
Qty: 10 TABLET | Refills: 0 | Status: SHIPPED | OUTPATIENT
Start: 2021-03-01 | End: 2021-03-06

## 2021-03-01 RX ADMIN — ACETAMINOPHEN 650 MG: 325 TABLET ORAL at 14:11

## 2021-03-01 RX ADMIN — CARBIDOPA AND LEVODOPA 1 TABLET: 25; 100 TABLET ORAL at 16:40

## 2021-03-01 RX ADMIN — ACETAMINOPHEN 650 MG: 325 TABLET ORAL at 10:54

## 2021-03-01 RX ADMIN — LEVETIRACETAM 500 MG: 500 TABLET ORAL at 09:01

## 2021-03-01 RX ADMIN — Medication 10 ML: at 09:01

## 2021-03-01 RX ADMIN — HEPARIN SODIUM 5000 UNITS: 10000 INJECTION INTRAVENOUS; SUBCUTANEOUS at 10:54

## 2021-03-01 RX ADMIN — CARBIDOPA AND LEVODOPA 1 TABLET: 25; 100 TABLET ORAL at 20:12

## 2021-03-01 RX ADMIN — BISACODYL 10 MG: 5 TABLET, COATED ORAL at 09:00

## 2021-03-01 RX ADMIN — CARBIDOPA AND LEVODOPA 1 TABLET: 25; 100 TABLET ORAL at 09:01

## 2021-03-01 RX ADMIN — LEVETIRACETAM 500 MG: 500 TABLET ORAL at 20:12

## 2021-03-01 RX ADMIN — DOCUSATE SODIUM 50 MG AND SENNOSIDES 8.6 MG 2 TABLET: 8.6; 5 TABLET, FILM COATED ORAL at 20:13

## 2021-03-01 RX ADMIN — ACETAMINOPHEN 650 MG: 325 TABLET ORAL at 16:40

## 2021-03-01 RX ADMIN — HEPARIN SODIUM 5000 UNITS: 10000 INJECTION INTRAVENOUS; SUBCUTANEOUS at 16:40

## 2021-03-01 RX ADMIN — ACETAMINOPHEN 650 MG: 325 TABLET ORAL at 06:21

## 2021-03-01 RX ADMIN — CARBIDOPA AND LEVODOPA 1 TABLET: 25; 100 TABLET ORAL at 11:27

## 2021-03-01 RX ADMIN — DOCUSATE SODIUM 50 MG AND SENNOSIDES 8.6 MG 2 TABLET: 8.6; 5 TABLET, FILM COATED ORAL at 09:01

## 2021-03-01 ASSESSMENT — PAIN SCALES - PAIN ASSESSMENT IN ADVANCED DEMENTIA (PAINAD)
TOTALSCORE: 0
BODYLANGUAGE: 0
CONSOLABILITY: 0
FACIALEXPRESSION: 0

## 2021-03-01 ASSESSMENT — PAIN SCALES - GENERAL
PAINLEVEL_OUTOF10: 4

## 2021-03-01 ASSESSMENT — PAIN DESCRIPTION - PROGRESSION: CLINICAL_PROGRESSION: NOT CHANGED

## 2021-03-01 NOTE — PROGRESS NOTES
Notified Dr. Mary Fox of loss of IV access to Left [facility]. New orders noted. 0'\"   2/28/21 8:36 PM   [address] [phone] From: Aniceto Gomez or Facility: [facility] 1945 RE: [place] RM: [id number] Requesting Doctor: Dr. Medrano  Established Patient Reason for Consult: Ashlyn Schmitt Dr. Patient has lost IV access. She had fluids ordered as of this morning, 0.9 at 70/hr. Did you still want her on fluids? She is not on any other IV medications.   Read 8:36 PM   0'\"     2/28/21 8:38 PM   No, can stop IVF

## 2021-03-01 NOTE — PROGRESS NOTES
TRAUMA SURGERY  ATTENDING PROGRESS NOTE      CC: fall     S:   ok says she has some back pain,     O:   @BP (!) 101/50   Pulse 69   Temp 99.2 °F (37.3 °C) (Temporal)   Resp 16   Ht 5' 5\" (1.651 m)   Wt 104 lb (47.2 kg)   SpO2 95%   BMI 17.31 kg/m² @    Gen - no apparent distress   Neuro - Awake, alert, attentive    HEENT - PERRL 3mm   Lungs - non labored, BS clear b/l    Heart - RR    Abdomen - SNT  Spine -   Mild L spine tenderness   Ext- ROM decreased all ext, tremulous upper ext worse than lowers     A/P: 71 yo with hx of DVT on chronic anticoagulation who has  parkinson's s/p fall with SDH. Active Problems:    SDH (subdural hematoma) (HCC)    Acute deep vein thrombosis (DVT) of right peroneal vein (HCC)    Anticoagulated    Parkinson's disease ([address]  Resolved Problems:    * No resolved hospital problems.  *      ICH, hold OAC, NS following keppra,   Parkinson's neurology recs appreciated,   L spine pain, monitor,  DVT hx, vascular following recs noted,   PTOT   D/c planning     DVT prophylaxis: SCDs, heparin TID     Sandy Pandya MD FACS

## 2021-03-01 NOTE — DISCHARGE INSTR - COC
Continuity of Care Form    Patient Name: Sunshine Ziegler   :  1945  MRN:  [id number]    Admit date:  2021  Discharge date:  21    Code Status Order: Full Code   Advance Directives:   Advance Care Flowsheet Documentation     Date/Time Healthcare Directive Type of Healthcare Directive Copy in [address] [facility] [address] Agent's Name Healthcare Agent's Phone Number    21 0507  No, patient does not have an advance directive for healthcare treatment -- -- -- -- --    21 1616  No, patient does not have an advance directive for healthcare treatment -- -- -- -- --          Admitting Physician:  Yakov Singleton MD  PCP: Reny Siegel MD    Discharging Nurse: Gina Martínez. [facility] Unit/Room#: 7565/5301-G  Discharging Unit Phone Number: [phone]    Emergency Contact:   Extended Emergency Contact Information  Primary Emergency Contact: Dorothea Marcus[address] Phone: [phone]  Mobile Phone: [phone]  Relation: Brother/Sister  Secondary Emergency Contact: Buck Jones [address] Phone: [phone]  Mobile Phone: [phone]  Relation: Other    Past Surgical History:  Past Surgical History:   Procedure Laterality Date    HERNIA REPAIR      TONSILLECTOMY         Immunization History: There is no immunization history on file for this patient.     Active Problems:  Patient Active Problem List   Diagnosis Code    Hypotension I95.9    Dementia ([facility] F03.90    Mixed hyperlipidemia E78.2    Mental retardation F79    Near syncope R55    Unable to ambulate R26.2    Mild protein-calorie malnutrition (HCC) E44.1    SDH (subdural hematoma) ([facility]) S06.5X9A    Acute deep vein thrombosis (DVT) of right peroneal vein ([facility]) I82.451    Anticoagulated Z79.01    Parkinson's disease ([facility] G20       Isolation/Infection:   Isolation          No Isolation        Patient Infection Status     Infection Onset Added Last Indicated Last Indicated By Review Planned Expiration Resolved Resolved By    None active    Resolved    COVID-19 Rule Out 02/24/21 02/24/21 02/24/21 COVID-19, Rapid (Ordered)   02/24/21 Rule-Out Test Resulted    COVID-19 Rule Out 02/21/21 02/21/21 02/21/21 COVID-19, Rapid (Ordered)   02/21/21 Rule-Out Test Resulted    COVID-19 Rule Out 10/23/20 10/24/20 10/23/20 Covid-19 Ambulatory (Ordered)   10/25/20 Rule-Out Test Resulted    COVID-19 Rule Out 10/08/20 10/08/20 10/08/20 COVID-19 (Ordered)   10/08/20 Rule-Out Test Resulted          Nurse Assessment:  Last Vital Signs: BP (!) 101/50   Pulse 69   Temp 99.2 °F (37.3 °C) (Temporal)   Resp 16   Ht 5' 5\" (1.651 m)   Wt 104 lb (47.2 kg)   SpO2 95%   BMI 17.31 kg/m²     Last documented pain score (0-10 scale): Pain Level: 4  Last Weight:   Wt Readings from Last 1 Encounters:   02/27/21 104 lb (47.2 kg)     Mental Status:  alert and oriented to self and place. IV Access:  - None    Nursing Mobility/ADLs:  Walking   Assisted  Transfer  Assisted  Bathing  Dependent  Dressing  Dependent  Toileting  Dependent  Feeding  Assisted  Med Admin  Assisted  Med Delivery   crushed and prefers mixed with Vanilla Pudding    Wound Care Documentation and Therapy:        Elimination:  Continence:   · Bowel: Yes  · Bladder: Yes  Urinary Catheter: None   Colostomy/Ileostomy/Ileal Conduit: No       Date of Last BM: 03/01/21    Intake/Output Summary (Last 24 hours) at 3/1/2021 1301  Last data filed at 3/1/2021 0859  Gross per 24 hour   Intake 120 ml   Output --   Net 120 ml     I/O last 3 completed shifts: In: 240 [P.O.:240]  Out: -     Safety Concerns: At Risk for Falls    Impairments/Disabilities:      None    Nutrition Therapy:  Current Nutrition Therapy:   - Oral Diet:  General    Routes of Feeding: Oral  Liquids:  Thin Liquids  Daily Fluid Restriction: no  Last Modified Barium Swallow with Video (Video Swallowing Test): not done    Treatments at the Time of Hospital Discharge:   Respiratory Treatments: ***  Oxygen Therapy:  is not on home oxygen therapy. Ventilator:    - No ventilator support    Rehab Therapies: Physical Therapy and Occupational Therapy  Weight Bearing Status/Restrictions: No weight bearing restirctions  Other Medical Equipment (for information only, NOT a DME order):  wheelchair  Other Treatments: ***    Patient's personal belongings (please select all that are sent with patient):  None    RN SIGNATURE:  Wally España RN    CASE MANAGEMENT/SOCIAL WORK SECTION    Inpatient Status Date: ***    Readmission Risk Assessment Score:  Readmission Risk              Risk of Unplanned Readmission:        18           Discharging to Facility/ Agency   · Name: [facility]  · Address:  · Phone:  · Fax:    Dialysis Facility (if applicable)   · Name:  · Address:  · Dialysis Schedule:  · Phone:  · Fax:    / signature: Electronically signed by DOROTA Brambila on 3/1/2021 at 1:01 PM      PHYSICIAN SECTION    Prognosis: {Prognosis:0717444893}    Condition at Discharge: [facility] Patient Condition:[id number]}    Rehab Potential (if transferring to Rehab): {Prognosis:7778985902}    Recommended Labs or Other Treatments After Discharge: ***    Physician Certification: I certify the above information and transfer of Wallace Humphries  is necessary for the continuing treatment of the diagnosis listed and that she requires [place] for less 30 days.      Update Admission H&P: {ALICIA SONG Changes in [facility]:499450752}    PHYSICIAN SIGNATURE:  {Esignature:[id number]}

## 2021-03-01 NOTE — PROGRESS NOTES
Department of Neurosurgery  Progress Note    CHIEF COMPLAINT: pt seen for SDH    SUBJECTIVE:  No headache    REVIEW OF SYSTEMS :  Constitutional: Negative for chills and fever. Neurological: Negative for dizziness, tremors and speech change.    GI: negative for abdominal pain  : negative for hematuria    OBJECTIVE:   VITALS:  BP (!) 101/50   Pulse 69   Temp 99.2 °F (37.3 °C) (Temporal)   Resp 16   Ht 5' 5\" (1.651 m)   Wt 104 lb (47.2 kg)   SpO2 95%   BMI 17.31 kg/m²   PHYSICAL:  CONSTITUTIONAL:  awake, alert, cooperative, no apparent distress, and appears stated age    DATA:  CBC:   Lab Results   Component Value Date    WBC 5.9 02/28/2021    RBC 3.24 02/28/2021    HGB 10.9 02/28/2021    HCT 32.3 02/28/2021    MCV 99.7 02/28/2021    MCH 33.6 02/28/2021    MCHC 33.7 02/28/2021    RDW 14.0 02/28/2021     02/28/2021    MPV 9.3 02/28/2021     BMP:    Lab Results   Component Value Date     02/28/2021    K 4.0 02/28/2021     02/28/2021    CO2 26 02/28/2021    BUN 12 02/28/2021    LABALBU 3.2 02/27/2021    CREATININE 0.5 02/28/2021    CALCIUM 8.9 02/28/2021    GFRAA >60 02/28/2021    LABGLOM >60 02/28/2021    GLUCOSE 79 02/28/2021     PT/INR:    Lab Results   Component Value Date    PROTIME 13.9 02/27/2021    INR 1.2 02/27/2021     PTT:    Lab Results   Component Value Date    APTT 30.7 02/27/2021   [APTT}    Current Inpatient Medications  Current Facility-Administered Medications: carbidopa-levodopa (SINEMET)  MG per tablet 1 tablet, 1 tablet, Oral, Q4H WA  heparin (porcine) injection 5,000 Units, 5,000 Units, Subcutaneous, Q8H  sodium chloride flush 0.9 % injection 10 mL, 10 mL, Intravenous, 2 times per day  sodium chloride flush 0.9 % injection 10 mL, 10 mL, Intravenous, PRN  acetaminophen (TYLENOL) tablet 650 mg, 650 mg, Oral, Q4H  ondansetron (ZOFRAN-ODT) disintegrating tablet 4 mg, 4 mg, Oral, Q8H PRN **OR** ondansetron (ZOFRAN) injection 4 mg, 4 mg, Intravenous, Q6H PRN  bisacodyl (DULCOLAX) EC tablet 10 mg, 10 mg, Oral, Daily  sennosides-docusate sodium (SENOKOT-S) 8.6-50 MG tablet 2 tablet, 2 tablet, Oral, BID  levETIRAcetam (KEPPRA) tablet 500 mg, 500 mg, Oral, BID    ASSESSMENT:   · Pt seen for SDH -stable exam    PLAN:  · No AC or AP medications  · No surgical intervention planned  · F/u in clinic in 4 weeks with head CT      Electronically signed by MARGE Holden on 3/1/2021 at 12:11 PM     I have interviewed and examined the patient and agree with above.   F/Uin 4 weeks    Anat Escalante

## 2021-03-01 NOTE — PLAN OF CARE
Problem: Malnutrition  (NI-5.2)  Goal: Food and/or Nutrient Delivery  Description: Individualized approach for food/nutrient provision. Intervention: MEDICAL FOOD SUPPLEMENTS (ND-3.1)  Flowsheets (Taken 3/1/2021 0818)  Medical Food Supplements: Commercial beverage  Note: Ensure Enlive TID  Intervention: VITAMIN SUPPLEMENT(S) (ND-3.2)  Flowsheets (Taken 3/1/2021 0818)  Vitamin Supplement(s): D  Intervention: MINERAL SUPPLEMENT(S) (ND-3.2)  Flowsheets (Taken 3/1/2021 0818)  Mineral Supplement(s): Calcium  Intervention: NUTRITION-RELATED MEDICATION MANAGEMENT (ND-6)  Note: Pt PTA taking megace. Pt currently having poor po intake in house.  Appetite stimulant usage here may be beneficial

## 2021-03-01 NOTE — CARE COORDINATION
3/1/2021 social work transition of care [address] is still pending. Sw left dry covid if pt is approved today. Sw spoke with virology there was a send out completed on 2/27-should come back tomorrow. Gómez updated Nurse.   Electronically signed by DOROTA Whatley on 3/1/2021 at 3:39 PM

## 2021-03-01 NOTE — PROGRESS NOTES
PCP is Loree Thomason MD  Office notified of admission.       Electronically signed by Montserrat Simmons RN MSN APRN-NP [facility] NP  [facility]N 3/1/2021 1:07 PM

## 2021-03-01 NOTE — PROGRESS NOTES
Occupational Therapy  OT BEDSIDE TREATMENT NOTE      Date:3/1/2021  Patient Name: Ceci Vargas  MRN: [id number]  : 1945  Room: [address] [place] [address]     Evaluating OT: Nessa Nelson OTR/L #[id number]     Referring physician: Stephanie Gonzalez MD     AM-PAC Daily Activity Raw Score: 1424  Recommended Adaptive Equipment: TBD      Reason for Admission/Diagnosis: Fall, subdural hematoma   Pertinent Medical History/Surgery: Parkinson's disease, anxiety, arthritis, asthma, dementia, depression, HLD, osteoporosis, mild MR     Patient with recent hospitalization and discharge 21 due to unable to ambulate with R LE DVT.    Precautions:  Falls, tremors, impaired cognition, alarm, TSM, TAPS     Home Living:Patient is a limited historian. Per chart and patient's sister, patient is a resident at [address] [facility]. Prior to this admission, patient was at [place] [address] for less than one day before return to hospital.      Equipment owned: walker  Prior Level of Function:   Assist with ADLs. Patient's sister is also a resident of [facility] and assists with ADLs as needed with additional assistance from staff. Assist with IADLs. Patient was using a walker for functional mobility. Patient able to complete functional mobility from room<>dining room for meals.    Driving: no                             Occupation: not employed  Leisure:not stated     Pain Level: No pain reported  Cognition: A&O: self:yes, place:hospital only, time: month and year, situation:no  Communication: Impaired, flat affect with increased processing time required   Follows 1 step directions              Memory:  fair+              Sequencing:  fair               Problem solving:  fair               Judgement/safety:  fair                 Functional Assessment:    Initial Eval Status  Date: 21 Treatment Status  Date: 3/1/21 Short Term Goals=LTG  Treatment frequency: PRN 2-4 x/week   Feeding Minimal Assist  Completed supine with HOB elevated, built-up foam handled provided   SBA  To retrieve drink from tray table and bring to mouth Modified [facility]    Grooming Minimal Assist  Completed hand hygiene x2 using  semi-supine in bed  SBA  To wash face and comb hair seated EOB  Modified [facility]     UB Dressing Moderate Assist   Min A  To don/doff gown seated EOB Minimal Assist    LB Dressing Maximal Assist  Mod A  To don/doff socks seated EOB  Minimal Assist    Bathing Maximal Assist   Mod A  Seated EOB, assist required for bilateral feet and posterior Minimal Assist    Toileting Moderate Assist  Completed toileting x2 using standard commode  Max A- hygiene  Supervision    Bed Mobility  Supine to sit: Min A   Sit to supine: Min A SBA- supine<->sit  Educated pt on technique to increase independence.    Supine to sit: Mod I   Sit to supine: Mod I   Functional Transfers Sit to stand: Min A  Stand to sit: Min A  Toilet Transfer: Mod A  Using fww/grab bars Min A- sit<->stand  Cuing for hand placement and body mechanics  Supervision using fww   Functional Mobility Min A  Completed bed<>bathroom functional mobility x2 using fww Mod A- stand pivot transfer  Using w/w  Supervision using fww  Functional household distance   Balance Sitting:     Static:SBA    Dynamic:NT  Standing: Min A Sitting:     Static: Ind    Dynamic: SBA  Standing: Min A      Activity Tolerance Fair  Fair Fair+   Visual/  Perceptual Glasses: no           Safety       Fair-  Fair                 Fair+       Comments: Upon arrival pt supine in bed. Pt educated on techniques to increase independence and safety during ADL's, bed mobility, and functional transfers. At end of session pt left seated upright in bed, call light within reach. · Pt has made fair+ progress towards set goals.      · Continue with current plan of care    Treatment Time In: 2:55            Treatment Time Out: 3:20             Treatment Charges: Mins Units   Ther Ex  [id number]     Manual Therapy Molina Fernando [id number] 00227 10 1 ADL/Home Mgt 47037 15 1   Neuro Re-ed [id number]     Group Therapy      Orthotic manage/training  00224     Non-Billable Time     Total Timed Treatment 25 2       Monica Fajardo

## 2021-03-01 NOTE — PROGRESS NOTES
5\" (165.1 cm)  · Current Body Weight: 104 lb (47.2 kg)(2/27)   · Admission Body Weight:      · Usual Body Weight: 106 lb 7.7 oz (48.3 kg)(10/2020 Per EMR)     · Ideal Body Weight: 125 lbs; % Ideal Body Weight 83.2 %   · BMI: 17.3  · BMI Categories: Underweight (BMI less than 22) age over 72       Nutrition Diagnosis:   · Mild malnutrition, In context of chronic illness related to inadequate protein-energy intake as evidenced by intake 0-25%, BMI      Nutrition Interventions:   Food and/or Nutrient Delivery:  Continue Current Diet, Start Oral Nutrition Supplement, Vitamin Supplement, Mineral Supplement  Nutrition Education/Counseling:  Education not indicated   Coordination of Nutrition Care:  Continue to monitor while inpatient    Goals:  Pt to consume > 75% of all meals/ONS       Nutrition Monitoring and Evaluation:   Behavioral-Environmental Outcomes:  None Identified   Food/Nutrient Intake Outcomes:  Food and Nutrient Intake, Supplement Intake  Physical Signs/Symptoms Outcomes:  Nutrition Focused Physical Findings, Biochemical Data, Skin, Weight, Constipation, GI Status, Fluid Status or Edema, Hemodynamic Status     Discharge Planning:     Too soon to determine     Electronically signed by Antolin Vegas RD, LD on 3/1/21 at 8:13 AM EST    Contact: [phone]

## 2021-03-01 NOTE — CARE COORDINATION
3/1/2021 social work transition of care planning  Pt is from [facility], plan is to return. Sw requested auth be initiated. Sw confirmed plan with pt's guardian Jarethteetee Rin. Sw will follow. Envelope will be in soft chart. It appears a send out covid was complete done 2/27-this should be sufficient, if not will need covid done day of discharge.   Electronically signed by DOROTA Caruso on 3/1/2021 at 1:07 PM

## 2021-03-02 ENCOUNTER — TELEPHONE (OUTPATIENT)
Dept: VASCULAR SURGERY | Age: 76
End: 2021-03-02

## 2021-03-02 NOTE — TELEPHONE ENCOUNTER
Spoke with Phillip Longoria at [facility], faxed order for bilateral lower extremity venous ultrasound to be done in 5 days to [phone]. She will fax results.

## 2021-03-09 PROBLEM — F39 AFFECTIVE DISORDER (HCC): Chronic | Status: ACTIVE | Noted: 2021-03-09

## 2021-03-09 NOTE — DISCHARGE SUMMARY
Physician Discharge Summary     Patient ID:  Sydney Lazaro  [id number]  70 y.o.  1945    Admit date: 2/21/2021    Discharge date and time:  2/26 4pm    Admission Diagnoses:   Patient Active Problem List   Diagnosis    Hypotension    Dementia ([facility]    Mixed hyperlipidemia    Mental retardation    Near syncope    Unable to ambulate    Mild protein-calorie malnutrition ([facility]    SDH (subdural hematoma) (HCC)    Acute deep vein thrombosis (DVT) of right peroneal vein ([facility]    Anticoagulated    Parkinson's disease ([facility]       Discharge Diagnoses: Weakness/falls    Consults: hematology/oncology    Procedures: None    Hospital Course:   1. Weakness/falls              Likely secondary to UTI/DVT. TSH vit D B12 WNL. Seen by physical and Occupational Therapy, discharged to skilled nursing facility  2. UTI              UA shows trace LE small blood. Empirically treated with antibiotics.  Urine culture 10-100k mixed regine  3. RLE DVT              Worsening pruritic rash.  Looks like petechiae though pruritic. Dermatology not coming inpatient. Etiology unclear possibly secondary to antibiotics versus anticoagulation. Seen by hematology, advised to continue anticoagulation for DVT  4. Rhabdomyolysis              Secondary to fall, continue with IVF              CK trending down  5. PNA - procal 0.27              Continue rocephin + doxy  6. Parkinson's disease - continue sinemet  7.  DVT px - lovenox    Discharge Exam:  Gen - NAD AAOx1  CV - RRR s1/s2, no M/R/G  Pulm - Fair air entry bilaterally, no wheeze  Abd - Soft NT ND  Ext - No LE edema    Condition:  Stable    Disposition: home    Patient Instructions:   Discharge Medication List as of 2/26/2021  3:16 PM      START taking these medications    Details   !! apixaban (ELIQUIS) 5 MG TABS tablet Take 2 tablets by mouth 2 times daily for 6 days, Disp-24 tablet, R-0NO PRINT      !! apixaban (ELIQUIS) 5 MG TABS tablet Take 1 tablet by mouth 2 times daily, Disp-60 tablet, R-0NO PRINT      doxycycline hyclate (VIBRAMYCIN) 100 MG capsule Take 1 capsule by mouth every 12 hours for 7 days, Disp-14 capsule, R-0NO PRINT       ! ! - Potential duplicate medications found. Please discuss with provider. CONTINUE these medications which have NOT CHANGED    Details   megestrol acetate (MEGACE) 400 MG/10ML SUSP Take 400 mg by mouth dailyHistorical Med      carbidopa-levodopa (SINEMET)  MG per tablet Take 1 tablet by mouth 3 times dailyHistorical Med      cetirizine (ZYRTEC ALLERGY) 10 MG tablet Take 10 mg by mouth every morningHistorical Med      Sod Fluoride-Potassium Nitrate (PREVIDENT 5000 SENSITIVE) 1.1-5 % PSTE Place onto teethHistorical Med      nystatin (MYCOSTATIN) 064971 UNIT/GM cream Apply topically 3 times daily, Topical, 3 TIMES DAILY, Historical Med      magnesium hydroxide (MILK OF MAGNESIA) 400 MG/5ML suspension Take 30 mLs by mouth as needed for Constipation Historical Med      vitamin E 600 UNITS capsule Take 600 Units by mouth every morning Historical Med      divalproex (DEPAKOTE ER) 500 MG extended release tablet Take 1,000 mg by mouth nightly Historical Med      clonazePAM (KLONOPIN) 0.5 MG tablet Take 0.25 mg by mouth 2 times daily  . Historical Med      risperiDONE (RISPERDAL) 2 MG tablet Take 2 mg by mouth nightlyHistorical Med      docusate sodium (COLACE) 100 MG capsule Take 100 mg by mouth every morning Historical Med      Calcium Carb-Cholecalciferol (CALCIUM + D3) 600-200 MG-UNIT TABS Take 1 tablet by mouth every morning Historical Med      citalopram (CELEXA) 40 MG tablet Take 40 mg by mouth nightly Historical Med      simvastatin (ZOCOR) 40 MG tablet Take 40 mg by mouth nightly. Historical Med      acetaminophen (TYLENOL) 650 MG CR tablet Take 650 mg by mouth 2 times daily. Historical Med      alendronate (FOSAMAX) 70 MG tablet Take 70 mg by mouth every 7 days MondaysHistorical Med         STOP taking these medications       naproxen sodium (ALEVE) 220 MG tablet Comments:   Reason for Stopping:         ibuprofen (ADVIL;MOTRIN) 200 MG tablet Comments:   Reason for Stopping:             Activity: ambulate in house  Diet: cardiac diet    Follow-up with PCP in 1 week    Note that over 30 minutes was spent in preparing discharge papers, discussing discharge with patient, medication review, etc.    Signed:  Bakari Saini    3/9/2021  9:46 AM

## 2021-03-15 ENCOUNTER — OFFICE VISIT (OUTPATIENT)
Dept: NEUROLOGY | Age: 76
End: 2021-03-15
Payer: COMMERCIAL

## 2021-03-15 VITALS
HEIGHT: 59 IN | DIASTOLIC BLOOD PRESSURE: 64 MMHG | OXYGEN SATURATION: 95 % | SYSTOLIC BLOOD PRESSURE: 103 MMHG | BODY MASS INDEX: 19.15 KG/M2 | WEIGHT: 95 LBS

## 2021-03-15 DIAGNOSIS — R45.1 MOTOR RESTLESSNESS: ICD-10-CM

## 2021-03-15 DIAGNOSIS — Z87.898 HX OF SYNCOPE: ICD-10-CM

## 2021-03-15 DIAGNOSIS — Z91.81 HX OF FALLING: Chronic | ICD-10-CM

## 2021-03-15 DIAGNOSIS — R53.81 PHYSICAL DECONDITIONING: Chronic | ICD-10-CM

## 2021-03-15 DIAGNOSIS — R25.1 ANXIETY RELATED TREMOR: ICD-10-CM

## 2021-03-15 DIAGNOSIS — F41.9 ANXIETY RELATED TREMOR: ICD-10-CM

## 2021-03-15 DIAGNOSIS — G25.1 DRUG-INDUCED TREMOR: Primary | ICD-10-CM

## 2021-03-15 DIAGNOSIS — G21.11 NEUROLEPTIC-INDUCED PARKINSONISM (HCC): Chronic | ICD-10-CM

## 2021-03-15 PROBLEM — G21.9 SECONDARY PARKINSONISM (HCC): Chronic | Status: ACTIVE | Noted: 2021-03-15

## 2021-03-15 PROCEDURE — 3017F COLORECTAL CA SCREEN DOC REV: CPT | Performed by: PSYCHIATRY & NEUROLOGY

## 2021-03-15 PROCEDURE — G8484 FLU IMMUNIZE NO ADMIN: HCPCS | Performed by: PSYCHIATRY & NEUROLOGY

## 2021-03-15 PROCEDURE — 1036F TOBACCO NON-USER: CPT | Performed by: PSYCHIATRY & NEUROLOGY

## 2021-03-15 PROCEDURE — G8427 DOCREV CUR MEDS BY ELIG CLIN: HCPCS | Performed by: PSYCHIATRY & NEUROLOGY

## 2021-03-15 PROCEDURE — G8400 PT W/DXA NO RESULTS DOC: HCPCS | Performed by: PSYCHIATRY & NEUROLOGY

## 2021-03-15 PROCEDURE — 1123F ACP DISCUSS/DSCN MKR DOCD: CPT | Performed by: PSYCHIATRY & NEUROLOGY

## 2021-03-15 PROCEDURE — 1090F PRES/ABSN URINE INCON ASSESS: CPT | Performed by: PSYCHIATRY & NEUROLOGY

## 2021-03-15 PROCEDURE — 99215 OFFICE O/P EST HI 40 MIN: CPT | Performed by: PSYCHIATRY & NEUROLOGY

## 2021-03-15 PROCEDURE — 1111F DSCHRG MED/CURRENT MED MERGE: CPT | Performed by: PSYCHIATRY & NEUROLOGY

## 2021-03-15 PROCEDURE — G8420 CALC BMI NORM PARAMETERS: HCPCS | Performed by: PSYCHIATRY & NEUROLOGY

## 2021-03-15 PROCEDURE — 4040F PNEUMOC VAC/ADMIN/RCVD: CPT | Performed by: PSYCHIATRY & NEUROLOGY

## 2021-03-15 ASSESSMENT — ENCOUNTER SYMPTOMS
EYES NEGATIVE: 1
GASTROINTESTINAL NEGATIVE: 1
ALLERGIC/IMMUNOLOGIC NEGATIVE: 1
RESPIRATORY NEGATIVE: 1

## 2021-03-15 NOTE — PROGRESS NOTES
Neurology Consult Note:    Patient: Yefri Chavez  : 1945  Date: 03/15/21  Referring provider: Loree Thomason MD      Referral to Neurology: s/p unwitnessed fall resulting in falcine subdural hematoma. 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 Dear Loree Thomason MD:     I have seen Yefri Chavez presenting to the Neurology clinic status post hospitalization , diagnosed with a traumatic subdural hematoma following an unwitnessed recurrent fall with history of chronic ambulatory dysfunction, parkinsonism, dementia, affective disorder, tremors, syncope and history of falling. Patient is unable to provide appropriate medical history. Daughter who is guardian presents with patient. The neurohospitalist consult obtained with Dr. Mariel Amor was reviewed, and he recommended restarting Sinemet 25/100 3 times daily dosing for her Parkinson's condition and tremors. The head and hand tremors are observed of rapid, coarse frequency and occur sporadically and intermittently, not consistent with a parkinsonian tremor or essential tremor but possibly an exaggerated physiologic, drug-induced or anxiety/stress related tremor. She is a patient of neurologist, Dr. Elizabeth Michael, [facility], whom her daughter reported first diagnosed her with Parkinson's disease. She has managed by a psychiatrist for affective disorder with divalproex ER, clonazepam, risperidone and Celexa. Risperidone and divalproex we discussed may produce symptoms consistent with secondary parkinsonism, tremor and paratonia. Lab Data: Reviewed from 2020.     Imaging Data: 21, [place] head CT:  Stable left frontal subdural hematoma measures approximately 2 mm in   thickness.  Stable subdural hemorrhage along anterior falx within   interhemispheric fissure.  Minimal subdural hemorrhage overlies right   parietal lobe measuring 2 mm in thickness.  No new intracranial hemorrhage. No intracranial edema.  Areas of chronic microvascular ischemia are present   in periventricular white matter. 2/27, CT of cervical spine without contrast:  Stable minimal anterolisthesis of C3 on C4 of   approximately 3 mm which is likely degenerative in etiology given significant   facet arthropathy.  Severe disc space narrowing at C5/C6 with subchondral   sclerosis and marginal osteophytosis. Neurohospitalist consult report reviewed from 2/28/2021, Dr. Rodriguez Gallegos; history of Parkinson's disease status post fall and closed head injury with loss of consciousness, subdural hematoma; Sinemet 25/100 mg 3 times daily dosing recommended to be restarted, maintenance therapy. Patient is a fall risk. Current Outpatient Medications   Medication Sig Dispense Refill    levETIRAcetam (KEPPRA) 500 MG tablet Take 1 tablet by mouth 2 times daily for 5 days 10 tablet 0    megestrol acetate (MEGACE) 400 MG/10ML SUSP Take 400 mg by mouth daily      carbidopa-levodopa (SINEMET)  MG per tablet Take 1 tablet by mouth 3 times daily      cetirizine (ZYRTEC ALLERGY) 10 MG tablet Take 10 mg by mouth every morning      Sod Fluoride-Potassium Nitrate (PREVIDENT 5000 SENSITIVE) 1.1-5 % PSTE Place onto teeth      nystatin (MYCOSTATIN) 525302 UNIT/GM cream Apply topically 3 times daily      magnesium hydroxide (MILK OF MAGNESIA) 400 MG/5ML suspension Take 30 mLs by mouth as needed for Constipation       vitamin E 600 UNITS capsule Take 600 Units by mouth every morning       divalproex (DEPAKOTE ER) 500 MG extended release tablet Take 1,000 mg by mouth nightly       clonazePAM (KLONOPIN) 0.5 MG tablet Take 0.25 mg by mouth 2 times daily  .       risperiDONE (RISPERDAL) 2 MG tablet Take 2 mg by mouth nightly      docusate sodium (COLACE) 100 MG capsule Take 100 mg by mouth every morning       Calcium needs     Medical: Not on file     Non-medical: Not on file   Tobacco Use    Smoking status: Never Smoker    Smokeless tobacco: Never Used   Substance and Sexual Activity    Alcohol use: No    Drug use: No    Sexual activity: Never   Lifestyle    Physical activity     Days per week: Not on file     Minutes per session: Not on file    Stress: Not on file   Relationships    Social connections     Talks on phone: Not on file     Gets together: Not on file     Attends Orthodox service: Not on file     Active member of club or organization: Not on file     Attends meetings of clubs or organizations: Not on file     Relationship status: Not on file    Intimate partner violence     Fear of current or ex partner: Not on file     Emotionally abused: Not on file     Physically abused: Not on file     Forced sexual activity: Not on file   Other Topics Concern    Not on file   Social History Narrative    Not on file     Review of Systems   Unable to perform ROS: Psychiatric disorder   Constitutional: Negative. HENT: Negative. Eyes: Negative. Respiratory: Negative. Cardiovascular: Negative. Gastrointestinal: Negative. Endocrine: Negative. Genitourinary: Negative. Musculoskeletal: Negative. Patient on cart. Fall risk. Unable to evaluate gait   Skin: Negative. Allergic/Immunologic: Negative. Neurological: Positive for tremors. Parkinson's, history of traumatic subdural hematoma status post fall, closed head injury. Hematological: Negative. Psychiatric/Behavioral: The patient is nervous/anxious. History affective disorder   All other systems reviewed and are negative. Neurologic Exam:  /64 (Site: Left Upper Arm, Position: Sitting, Cuff Size: Medium Adult)   Ht 4' 11\" (1.499 m)   Wt 95 lb (43.1 kg)   SpO2 95%   BMI 19.19 kg/m²   General appearance: Alert, anxious, confined to cart, daughter in exam room.   Intermittent side to side coarse head tremor and hand tremors, not rhythmical or consistent with parkinsonian tremor or essential tremor, increased with anxiety/stress. HEENT: Normocephalic/atraumatic. Neck: Supple  Cardiac: RRR  Respiratory: grossly clear  Extremities: Edema, erythema or cyanosis  Skin: No apparent lesions or rashes  Musculoskeletal: No fasciculations, no foot drop  Mental Status: Alert, oriented x2  Speech/Language: Clear, paucity of speech, no dysarthria or paraphasic word errors. Attention span/Concentration: Reduced with easy distractibility  Affect/Mood: Moderately anxious  Insight/Judgement: Poor     Fund of Knowledge/Current events: Poor  CN II-XII:     Pupils: Equal, reactive to light, 2.5 mm     EOM's: Grossly full without nystagmus. No gaze deviation. Visual Fields: Grossly full to confrontation  Fundi: Unable to visualize, miosis to light  CN V: normal V1-V3  CN VII: Decreased facial expression, no facial droop. CN VIII: Hearing grossly intact  CN IX-XII: Tongue midline  SCM/Trapezii: 5/5 power  Motor: Effort-related, 4+/5 power in the upper extremities 4-4+/5 power in the lower extremities. Paratonia with increased motor tone in the left greater than right upper extremities and moderate and symmetric in the lower extremities. Does not demonstrate a classic parkinsonian or pill-rolling resting tremor. DTR's: 1+ in the upper extremities, absent in lower extremities, no ankle clonus. Sensory: Unable to accurately assess. No subjective focal sensory deficits reported. Coordination/Gait: Grossly intact finger-to-nose testing without accentuated intention tremor. Assessment/Plan:  1. Intermittent side to side coarse head tremor and hand tremors, not rhythmical or consistent with parkinsonian tremor or essential tremor, increased with anxiety/stress, thus more consistent with an exaggerated physiologic tremor, drug-induced, or anxiety/stress-related tremor  2.   Secondary parkinsonism, tremors, likely related to affective disorder, psychotropic medications. 3.  History of chronic ambulatory dysfunction, paratonia, likely multifactorial etiology and also related to secondary parkinsonism, deconditioning, history of frequent falling, DJD of knee joints. 4.  Traumatic subdural hematoma, slowly resolving. 5. She may continue regular Sinemet 25/100 mg but advised reduce to twice daily dosing schedule. Secondary parkinsonism will not be aided by parkinsonian medications. 6.  She should follow-up with Dr. Jocy Razo, [facility], her outside neurologist as needed. 7.  She may benefit from a physical medicine and rehabilitation consult for further assessment and to determine her rehab. potential and any assistive devices that she may require. 8.  An x-ray of the lumbar and thoracic spines to evaluate for DJD, rule out compression fracture should also be considered. 9.  She may otherwise continue her medical follow-up with her primary medical provider, and nursing home physician for general medical and supportive care. Sincerely,      Andrei David MD    This note was created using speech recognition transcription software. Despite proofreading, there may be several typographical errors present that may affect the meaning of the content. Please call with any questions. Note: More than 50% of this 40-minute face-face visit time included counseling and coordination of care based on clinical impression, review of test results, treatment plan, risk factor reduction and patient and/or family education.

## 2021-03-16 DIAGNOSIS — S06.5XAA SDH (SUBDURAL HEMATOMA): Primary | ICD-10-CM

## 2021-03-29 ENCOUNTER — HOSPITAL ENCOUNTER (OUTPATIENT)
Dept: CT IMAGING | Age: 76
Discharge: HOME | End: 2021-03-31
Payer: COMMERCIAL

## 2021-03-29 DIAGNOSIS — S06.5XAA SDH (SUBDURAL HEMATOMA): ICD-10-CM

## 2021-03-29 PROCEDURE — 70450 CT HEAD/BRAIN W/O DYE: CPT

## 2021-04-05 ENCOUNTER — HOSPITAL ENCOUNTER (EMERGENCY)
Age: 76
Discharge: HOME | End: 2021-04-06
Payer: COMMERCIAL

## 2021-04-05 ENCOUNTER — APPOINTMENT (OUTPATIENT)
Dept: CT IMAGING | Age: 76
End: 2021-04-05
Payer: COMMERCIAL

## 2021-04-05 VITALS
SYSTOLIC BLOOD PRESSURE: 123 MMHG | RESPIRATION RATE: 18 BRPM | BODY MASS INDEX: 19.15 KG/M2 | TEMPERATURE: 97.8 F | OXYGEN SATURATION: 95 % | HEART RATE: 94 BPM | DIASTOLIC BLOOD PRESSURE: 78 MMHG | HEIGHT: 59 IN | WEIGHT: 95 LBS
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
## 2021-04-05 DIAGNOSIS — S13.9XXA NECK SPRAIN, INITIAL ENCOUNTER: ICD-10-CM

## 2021-04-05 DIAGNOSIS — S09.90XA CLOSED HEAD INJURY, INITIAL ENCOUNTER: Primary | ICD-10-CM

## 2021-04-05 PROCEDURE — 72125 CT NECK SPINE W/O DYE: CPT

## 2021-04-05 PROCEDURE — 70450 CT HEAD/BRAIN W/O DYE: CPT

## 2021-04-05 PROCEDURE — 99283 EMERGENCY DEPT VISIT LOW MDM: CPT

## 2021-04-05 ASSESSMENT — PAIN SCALES - GENERAL: PAINLEVEL_OUTOF10: 4

## 2021-04-05 ASSESSMENT — PAIN DESCRIPTION - DESCRIPTORS: DESCRIPTORS: HEADACHE

## 2021-04-06 NOTE — ED PROVIDER NOTES
[address] [place] [address]  Department of Emergency Medicine   ED  Encounter Note  Admit Date/RoomTime: 2021  7:41 PM  ED Room: [facility]    NAME: Conner Rojas  : 1945  MRN: [id number]     Chief Complaint:  Fall (fall out of her wheelchair at [facility]. -loc -thinners. c/o a headache. )    History of Present Illness         Conner Rojas is a 70 y.o. old female who presents to the emergency department for complaint of falling out of wheelchair. According to EMS she is a resident of nursing facility with history of Parkinson's disease. Reported that she was a witnessed fall. No LOC. No use of blood thinners. History of subdural hematoma diagnosed 2021. Complains of headache and neck pain. Denies nausea, vomiting, back pain, blurred vision, numbness, tingling, chest pain, shortness of breath, abdominal pain, lightheadedness, dizziness, syncope, or any other complaints at this time. Since onset the symptoms have been moderate in degree. Her pain is aggraveated by movement and palpation and relieved by nothing. She takes no blood thinning agents. The patients tetanus status is more than 5 years ago. ROS   Pertinent positives and negatives are stated within HPI, all other systems reviewed and are negative. Past Medical History:  has a past medical history of Affective disorder ([address] Anxiety, Arthritis, Asthma, Dementia ([address] Depression, Depression, Hx of falling, Hx of syncope, Hyperlipidemia, Mild mental retardation, Motor restlessness, Osteoporosis, Parkinson disease ([address] Physical deconditioning, and Secondary parkinsonism ([address] Surgical History:  has a past surgical history that includes Tonsillectomy and hernia repair. Social History:  reports that she has never smoked. She has never used smokeless tobacco. She reports that she does not drink alcohol or use drugs. Family History: Family history is unknown by patient.      Allergies: Pcn [penicillins]    Physical Exam   Oxygen Saturation Interpretation: Normal.        ED Triage Vitals [04/05/21 1942]   BP Temp Temp Source Pulse Resp SpO2 Height Weight   123/78 97.8 °F (36.6 °C) Temporal 103 18 95 % 4' 11\" (1.499 m) 95 lb (43.1 kg)         Constitutional: Level of Consciousness: Awake and alert, cooperative. ETOH: No.               Distress: none. Head:  Traumatic:  yes. Scalp Tenderness:  frontal.                  Deformity: no.               Skin: Small 2 cm hematoma noted to superior aspect of right forehead. There is no active bleeding. No drainage. Small abrasion to central aspect. Eyes:  PERRL. No periorbital ecchymoses. Conjunctiva: normal.  Ears:  External ears without lesions. Throat:  Airway patient. Neck: Mild tenderness noted to mid cervical region. No swelling, step-off, or erythema. Chest:  Symmetrical without visible rash or tenderness. Respiratory:  Clear to auscultation and breath sounds equal.  CV:  Regular rate and rhythm, normal heart sounds, without pathological murmurs. GI:  Abdomen Soft, nontender. No abrasions, ecchymoses, or lacerations. Back:  No costovertebral, paravertebral, intervertebral, or vertebral tenderness or spasm. Integument:  No abrasions, ecchymoses, or lacerations unless noted elsewhere. Extremities  No tenderness or swelling. Normal, painless range of motion. No neurovascular deficit. Neurological:  Oriented x 3, oriented to person, place, and month. Motor functions intact. Obeys to all simple commands. She is able to move all extremities x4. Intermittent tremors noted. Patient has history of Parkinson's disease. Lab / Imaging Results   (All laboratory and radiology results have been personally reviewed by myself)  Labs:  No results found for this visit on 04/05/21. Imaging: All Radiology results interpreted by Radiologist unless otherwise noted.   CT HEAD WO CONTRAST   Final Result   Right frontal scalp hematoma with no associated fractures or evidence of   acute intracranial trauma. .      Periventricular white matter changes consistent chronic microvascular   disease. Diffuse volume loss. CT CERVICAL SPINE WO CONTRAST   Final Result   No acute abnormality of the cervical spine. Straightening of the cervical spine with multilevel degenerative changes. ED Course / Medical Decision Making     Medications   Tetanus-Diphth-Acell Pertussis (BOOSTRIX) injection 0.5 mL (has no administration in time range)      Consult(s):   None    Procedure(s):   none    MDM:   Hamzah Lorenz is a 74-year-old female who presented to the emergency department by EMS from [facility] facility for complaint of witnessed mechanical fall out of wheelchair. History of Parkinson's disease. No LOC. Patient complained of a headache and neck pain on arrival.  No other complaints voiced. She was neurologically intact and at baseline. History of subdural hematoma 2/2021. CT head and neck completed. Right frontal scalp hematoma was noted. No associated fractures or evidence of acute intercranial trauma. She remained neurologically intact, nontoxic, and appropriate for outpatient management. Instructed to follow-up with PCP and advised to return for any new, change, worsening symptoms or concerns. At this time the patient is without objective evidence of an acute process requiring hospitalization or inpatient management. They have remained hemodynamically stable throughout their entire ED visit and are stable for discharge with outpatient follow-up. The plan has been discussed in detail and they are aware of the specific conditions for emergent return, as well as the importance of follow-up. Plan of Care/Counseling:  I reviewed today's visit with the patient in addition to providing specific details for the plan of care and counseling regarding the diagnosis and prognosis.   Questions are answered at this time and are agreeable with the plan. Assessment      1. Closed head injury, initial encounter    2. Fall, initial encounter    3. Neck sprain, initial encounter      Plan   Discharge home. Patient condition is good    New Medications     New Prescriptions    No medications on file     Electronically signed by WILLY Dan CNP   DD: 4/5/21  **This report was transcribed using voice recognition software. Every effort was made to ensure accuracy; however, inadvertent computerized transcription errors may be present.   END OF ED PROVIDER NOTE        WILLY Dan CNP  04/05/21 0249

## 2021-04-06 NOTE — ED NOTES
Bed: [facility].  Expected date:   Expected time:   Means of arrival:   Comments:  Room 7414 [facility],[address], RN  04/05/21 8590

## 2023-06-02 ENCOUNTER — HOSPITAL ENCOUNTER (INPATIENT)
Age: 78
LOS: 2 days | Discharge: HOME | DRG: 872 | End: 2023-06-05
Attending: EMERGENCY MEDICINE | Admitting: INTERNAL MEDICINE
Payer: COMMERCIAL

## 2023-06-02 ENCOUNTER — APPOINTMENT (OUTPATIENT)
Dept: GENERAL RADIOLOGY | Age: 78
DRG: 872 | End: 2023-06-02
Payer: COMMERCIAL

## 2023-06-02 ENCOUNTER — APPOINTMENT (OUTPATIENT)
Dept: CT IMAGING | Age: 78
DRG: 872 | End: 2023-06-02
Payer: COMMERCIAL

## 2023-06-02 DIAGNOSIS — R82.81 PYURIA: ICD-10-CM

## 2023-06-02 DIAGNOSIS — L03.90 SEPSIS DUE TO CELLULITIS (HCC): Primary | ICD-10-CM

## 2023-06-02 DIAGNOSIS — G93.41 ACUTE METABOLIC ENCEPHALOPATHY: ICD-10-CM

## 2023-06-02 DIAGNOSIS — A41.9 SEPSIS DUE TO CELLULITIS (HCC): Primary | ICD-10-CM

## 2023-06-02 DIAGNOSIS — L03.116 CELLULITIS OF LEFT LOWER EXTREMITY: ICD-10-CM

## 2023-06-02 LAB
AMORPH SED URNS QL MICRO: ABNORMAL
APTT BLD: 28.9 SEC (ref 24.5–35.1)
BACTERIA URNS QL MICRO: ABNORMAL /HPF
BASOPHILS # BLD: 0 E9/L (ref 0–0.2)
BASOPHILS NFR BLD: 0 % (ref 0–2)
BILIRUB UR QL STRIP: NEGATIVE
CLARITY UR: CLEAR
COLOR UR: YELLOW
EOSINOPHIL # BLD: 0 E9/L (ref 0.05–0.5)
EOSINOPHIL NFR BLD: 0 % (ref 0–6)
EPI CELLS #/AREA URNS HPF: ABNORMAL /HPF
ERYTHROCYTE [DISTWIDTH] IN BLOOD BY AUTOMATED COUNT: 14.1 FL (ref 11.5–15)
ERYTHROCYTE [SEDIMENTATION RATE] IN BLOOD BY WESTERGREN METHOD: 33 MM/HR (ref 0–20)
GLUCOSE UR STRIP-MCNC: NEGATIVE MG/DL
HCT VFR BLD AUTO: 38.9 % (ref 34–48)
HGB BLD-MCNC: 12.5 G/DL (ref 11.5–15.5)
HGB UR QL STRIP: ABNORMAL
HYPOCHROMIA: ABNORMAL
INFLUENZA A BY PCR: NOT DETECTED
INFLUENZA B BY PCR: NOT DETECTED
INR BLD: 1.1
KETONES UR STRIP-MCNC: 15 MG/DL
LACTATE BLDV-SCNC: 3.3 MMOL/L (ref 0.5–1.9)
LEUKOCYTE ESTERASE UR QL STRIP: NEGATIVE
LYMPHOCYTES # BLD: 0.37 E9/L (ref 1.5–4)
LYMPHOCYTES NFR BLD: 1.7 % (ref 20–42)
MCH RBC QN AUTO: 30.8 PG (ref 26–35)
MCHC RBC AUTO-ENTMCNC: 32.1 % (ref 32–34.5)
MCV RBC AUTO: 95.8 FL (ref 80–99.9)
METAMYELOCYTES NFR BLD MANUAL: 1.7 % (ref 0–1)
MONOCYTES # BLD: 1.31 E9/L (ref 0.1–0.95)
MONOCYTES NFR BLD: 7 % (ref 2–12)
NEUTROPHILS # BLD: 17.02 E9/L (ref 1.8–7.3)
NEUTS SEG NFR BLD: 89.6 % (ref 43–80)
NITRITE UR QL STRIP: NEGATIVE
NRBC BLD-RTO: 0 /100 WBC
PH UR STRIP: 6 [PH] (ref 5–9)
PLATELET # BLD AUTO: 231 E9/L (ref 130–450)
PMV BLD AUTO: 10.4 FL (ref 7–12)
POLYCHROMASIA: ABNORMAL
PROT UR STRIP-MCNC: NEGATIVE MG/DL
PROTHROMBIN TIME: 12.7 SEC (ref 9.3–12.4)
RBC # BLD AUTO: 4.06 E12/L (ref 3.5–5.5)
RBC #/AREA URNS HPF: ABNORMAL /HPF (ref 0–2)
ROULEAUX: ABNORMAL
SARS-COV-2 RDRP RESP QL NAA+PROBE: NOT DETECTED
SP GR UR STRIP: 1.02 (ref 1–1.03)
UROBILINOGEN UR STRIP-ACNC: 0.2 E.U./DL
WBC # BLD: 18.7 E9/L (ref 4.5–11.5)
WBC #/AREA URNS HPF: ABNORMAL /HPF (ref 0–5)

## 2023-06-02 PROCEDURE — 71045 X-RAY EXAM CHEST 1 VIEW: CPT

## 2023-06-02 PROCEDURE — 85651 RBC SED RATE NONAUTOMATED: CPT

## 2023-06-02 PROCEDURE — 96367 TX/PROPH/DG ADDL SEQ IV INF: CPT

## 2023-06-02 PROCEDURE — 73590 X-RAY EXAM OF LOWER LEG: CPT

## 2023-06-02 PROCEDURE — 96361 HYDRATE IV INFUSION ADD-ON: CPT

## 2023-06-02 PROCEDURE — 6370000000 HC RX 637 (ALT 250 FOR IP): Performed by: STUDENT IN AN ORGANIZED HEALTH CARE EDUCATION/TRAINING PROGRAM

## 2023-06-02 PROCEDURE — 93005 ELECTROCARDIOGRAM TRACING: CPT | Performed by: STUDENT IN AN ORGANIZED HEALTH CARE EDUCATION/TRAINING PROGRAM

## 2023-06-02 PROCEDURE — 82140 ASSAY OF AMMONIA: CPT

## 2023-06-02 PROCEDURE — 80164 ASSAY DIPROPYLACETIC ACD TOT: CPT

## 2023-06-02 PROCEDURE — 82533 TOTAL CORTISOL: CPT

## 2023-06-02 PROCEDURE — 2580000003 HC RX 258: Performed by: STUDENT IN AN ORGANIZED HEALTH CARE EDUCATION/TRAINING PROGRAM

## 2023-06-02 PROCEDURE — 83880 ASSAY OF NATRIURETIC PEPTIDE: CPT

## 2023-06-02 PROCEDURE — 36415 COLL VENOUS BLD VENIPUNCTURE: CPT

## 2023-06-02 PROCEDURE — 87502 INFLUENZA DNA AMP PROBE: CPT

## 2023-06-02 PROCEDURE — 83735 ASSAY OF MAGNESIUM: CPT

## 2023-06-02 PROCEDURE — 84484 ASSAY OF TROPONIN QUANT: CPT

## 2023-06-02 PROCEDURE — 86140 C-REACTIVE PROTEIN: CPT

## 2023-06-02 PROCEDURE — 87088 URINE BACTERIA CULTURE: CPT

## 2023-06-02 PROCEDURE — 85610 PROTHROMBIN TIME: CPT

## 2023-06-02 PROCEDURE — 83690 ASSAY OF LIPASE: CPT

## 2023-06-02 PROCEDURE — 87040 BLOOD CULTURE FOR BACTERIA: CPT

## 2023-06-02 PROCEDURE — 80053 COMPREHEN METABOLIC PANEL: CPT

## 2023-06-02 PROCEDURE — 99285 EMERGENCY DEPT VISIT HI MDM: CPT

## 2023-06-02 PROCEDURE — 81001 URINALYSIS AUTO W/SCOPE: CPT

## 2023-06-02 PROCEDURE — 6360000002 HC RX W HCPCS: Performed by: STUDENT IN AN ORGANIZED HEALTH CARE EDUCATION/TRAINING PROGRAM

## 2023-06-02 PROCEDURE — 85025 COMPLETE CBC W/AUTO DIFF WBC: CPT

## 2023-06-02 PROCEDURE — 83605 ASSAY OF LACTIC ACID: CPT

## 2023-06-02 PROCEDURE — 87635 SARS-COV-2 COVID-19 AMP PRB: CPT

## 2023-06-02 PROCEDURE — 96365 THER/PROPH/DIAG IV INF INIT: CPT

## 2023-06-02 PROCEDURE — 85730 THROMBOPLASTIN TIME PARTIAL: CPT

## 2023-06-02 RX ORDER — 0.9 % SODIUM CHLORIDE 0.9 %
1000 INTRAVENOUS SOLUTION INTRAVENOUS ONCE
Status: COMPLETED | OUTPATIENT
Start: 2023-06-02 | End: 2023-06-02

## 2023-06-02 RX ORDER — ACETAMINOPHEN 500 MG
1000 TABLET ORAL ONCE
Status: COMPLETED | OUTPATIENT
Start: 2023-06-02 | End: 2023-06-02

## 2023-06-02 RX ADMIN — ACETAMINOPHEN 1000 MG: 500 TABLET ORAL at 21:51

## 2023-06-02 RX ADMIN — VANCOMYCIN HYDROCHLORIDE 1000 MG: 1 INJECTION, POWDER, LYOPHILIZED, FOR SOLUTION INTRAVENOUS at 23:14

## 2023-06-02 RX ADMIN — SODIUM CHLORIDE 1000 ML: 9 INJECTION, SOLUTION INTRAVENOUS at 23:12

## 2023-06-02 RX ADMIN — CEFEPIME 2000 MG: 2 INJECTION, POWDER, FOR SOLUTION INTRAVENOUS at 22:39

## 2023-06-02 RX ADMIN — SODIUM CHLORIDE 1000 ML: 9 INJECTION, SOLUTION INTRAVENOUS at 21:52

## 2023-06-02 ASSESSMENT — PAIN SCALES - GENERAL
PAINLEVEL_OUTOF10: 6
PAINLEVEL_OUTOF10: 6

## 2023-06-02 ASSESSMENT — LIFESTYLE VARIABLES
HOW OFTEN DO YOU HAVE A DRINK CONTAINING ALCOHOL: NEVER
HOW MANY STANDARD DRINKS CONTAINING ALCOHOL DO YOU HAVE ON A TYPICAL DAY: PATIENT DOES NOT DRINK

## 2023-06-03 ENCOUNTER — APPOINTMENT (OUTPATIENT)
Dept: CT IMAGING | Age: 78
DRG: 872 | End: 2023-06-03
Payer: COMMERCIAL

## 2023-06-03 PROBLEM — L08.9 SEPSIS DUE TO SKIN INFECTION (HCC): Status: ACTIVE | Noted: 2023-06-03

## 2023-06-03 PROBLEM — A41.9 SEPSIS DUE TO SKIN INFECTION (HCC): Status: ACTIVE | Noted: 2023-06-03

## 2023-06-03 LAB
ALBUMIN SERPL-MCNC: 3.6 G/DL (ref 3.5–5.2)
ALP SERPL-CCNC: 38 U/L (ref 35–104)
ALT SERPL-CCNC: 7 U/L (ref 0–32)
AMMONIA PLAS-SCNC: 32.9 UMOL/L (ref 11–51)
ANION GAP SERPL CALCULATED.3IONS-SCNC: 10 MMOL/L (ref 7–16)
ANION GAP SERPL CALCULATED.3IONS-SCNC: 14 MMOL/L (ref 7–16)
ANTISTREPTOLYSIN-O: 212 IU/ML (ref 0–200)
AST SERPL-CCNC: 15 U/L (ref 0–31)
BASOPHILS # BLD: 0.03 E9/L (ref 0–0.2)
BASOPHILS NFR BLD: 0.2 % (ref 0–2)
BILIRUB SERPL-MCNC: <0.2 MG/DL (ref 0–1.2)
BNP BLD-MCNC: 316 PG/ML (ref 0–450)
BUN SERPL-MCNC: 23 MG/DL (ref 6–23)
BUN SERPL-MCNC: 30 MG/DL (ref 6–23)
CALCIUM SERPL-MCNC: 8.4 MG/DL (ref 8.6–10.2)
CALCIUM SERPL-MCNC: 9.5 MG/DL (ref 8.6–10.2)
CHLORIDE SERPL-SCNC: 105 MMOL/L (ref 98–107)
CHLORIDE SERPL-SCNC: 112 MMOL/L (ref 98–107)
CO2 SERPL-SCNC: 22 MMOL/L (ref 22–29)
CO2 SERPL-SCNC: 22 MMOL/L (ref 22–29)
CORTIS SERPL-MCNC: 27.1 MCG/DL (ref 2.68–18.4)
CREAT SERPL-MCNC: 0.9 MG/DL (ref 0.5–1)
CREAT SERPL-MCNC: 1 MG/DL (ref 0.5–1)
CRP SERPL HS-MCNC: 2.8 MG/DL (ref 0–0.4)
EKG ATRIAL RATE: 105 BPM
EKG P AXIS: 71 DEGREES
EKG P-R INTERVAL: 124 MS
EKG Q-T INTERVAL: 334 MS
EKG QRS DURATION: 72 MS
EKG QTC CALCULATION (BAZETT): 441 MS
EKG R AXIS: 63 DEGREES
EKG T AXIS: 19 DEGREES
EKG VENTRICULAR RATE: 105 BPM
EOSINOPHIL # BLD: 0.07 E9/L (ref 0.05–0.5)
EOSINOPHIL NFR BLD: 0.4 % (ref 0–6)
ERYTHROCYTE [DISTWIDTH] IN BLOOD BY AUTOMATED COUNT: 14.4 FL (ref 11.5–15)
GLUCOSE SERPL-MCNC: 129 MG/DL (ref 74–99)
GLUCOSE SERPL-MCNC: 154 MG/DL (ref 74–99)
HCT VFR BLD AUTO: 35.5 % (ref 34–48)
HGB BLD-MCNC: 11.6 G/DL (ref 11.5–15.5)
IMM GRANULOCYTES # BLD: 0.21 E9/L
IMM GRANULOCYTES NFR BLD: 1.1 % (ref 0–5)
LACTATE BLDV-SCNC: 2.5 MMOL/L (ref 0.5–1.9)
LACTATE BLDV-SCNC: 2.8 MMOL/L (ref 0.5–2.2)
LIPASE: 31 U/L (ref 13–60)
LYMPHOCYTES # BLD: 0.83 E9/L (ref 1.5–4)
LYMPHOCYTES NFR BLD: 4.2 % (ref 20–42)
MAGNESIUM SERPL-MCNC: 2 MG/DL (ref 1.6–2.6)
MCH RBC QN AUTO: 31.4 PG (ref 26–35)
MCHC RBC AUTO-ENTMCNC: 32.7 % (ref 32–34.5)
MCV RBC AUTO: 95.9 FL (ref 80–99.9)
MONOCYTES # BLD: 0.47 E9/L (ref 0.1–0.95)
MONOCYTES NFR BLD: 2.4 % (ref 2–12)
NEUTROPHILS # BLD: 18.32 E9/L (ref 1.8–7.3)
NEUTS SEG NFR BLD: 91.7 % (ref 43–80)
PLATELET # BLD AUTO: 225 E9/L (ref 130–450)
PMV BLD AUTO: 10.5 FL (ref 7–12)
POTASSIUM SERPL-SCNC: 4.1 MMOL/L (ref 3.5–5)
POTASSIUM SERPL-SCNC: 4.3 MMOL/L (ref 3.5–5)
PROT SERPL-MCNC: 6.6 G/DL (ref 6.4–8.3)
RBC # BLD AUTO: 3.7 E12/L (ref 3.5–5.5)
SODIUM SERPL-SCNC: 141 MMOL/L (ref 132–146)
SODIUM SERPL-SCNC: 144 MMOL/L (ref 132–146)
TROPONIN, HIGH SENSITIVITY: 14 NG/L (ref 0–9)
TROPONIN, HIGH SENSITIVITY: 16 NG/L (ref 0–9)
VALPROATE SERPL-MCNC: 75 MCG/ML (ref 50–100)
WBC # BLD: 19.9 E9/L (ref 4.5–11.5)

## 2023-06-03 PROCEDURE — 84484 ASSAY OF TROPONIN QUANT: CPT

## 2023-06-03 PROCEDURE — 2580000003 HC RX 258: Performed by: STUDENT IN AN ORGANIZED HEALTH CARE EDUCATION/TRAINING PROGRAM

## 2023-06-03 PROCEDURE — 6370000000 HC RX 637 (ALT 250 FOR IP): Performed by: NURSE PRACTITIONER

## 2023-06-03 PROCEDURE — 6360000002 HC RX W HCPCS: Performed by: STUDENT IN AN ORGANIZED HEALTH CARE EDUCATION/TRAINING PROGRAM

## 2023-06-03 PROCEDURE — 93010 ELECTROCARDIOGRAM REPORT: CPT | Performed by: INTERNAL MEDICINE

## 2023-06-03 PROCEDURE — 6370000000 HC RX 637 (ALT 250 FOR IP): Performed by: SPECIALIST

## 2023-06-03 PROCEDURE — 85025 COMPLETE CBC W/AUTO DIFF WBC: CPT

## 2023-06-03 PROCEDURE — 2580000003 HC RX 258: Performed by: NURSE PRACTITIONER

## 2023-06-03 PROCEDURE — 70450 CT HEAD/BRAIN W/O DYE: CPT

## 2023-06-03 PROCEDURE — 80048 BASIC METABOLIC PNL TOTAL CA: CPT

## 2023-06-03 PROCEDURE — 6360000002 HC RX W HCPCS: Performed by: NURSE PRACTITIONER

## 2023-06-03 PROCEDURE — 36415 COLL VENOUS BLD VENIPUNCTURE: CPT

## 2023-06-03 PROCEDURE — 83605 ASSAY OF LACTIC ACID: CPT

## 2023-06-03 PROCEDURE — 6370000000 HC RX 637 (ALT 250 FOR IP): Performed by: STUDENT IN AN ORGANIZED HEALTH CARE EDUCATION/TRAINING PROGRAM

## 2023-06-03 PROCEDURE — 86060 ANTISTREPTOLYSIN O TITER: CPT

## 2023-06-03 PROCEDURE — 6360000004 HC RX CONTRAST MEDICATION: Performed by: RADIOLOGY

## 2023-06-03 PROCEDURE — 2060000000 HC ICU INTERMEDIATE R&B

## 2023-06-03 PROCEDURE — 96375 TX/PRO/DX INJ NEW DRUG ADDON: CPT

## 2023-06-03 PROCEDURE — 6370000000 HC RX 637 (ALT 250 FOR IP): Performed by: FAMILY MEDICINE

## 2023-06-03 PROCEDURE — 96361 HYDRATE IV INFUSION ADD-ON: CPT

## 2023-06-03 PROCEDURE — 74177 CT ABD & PELVIS W/CONTRAST: CPT

## 2023-06-03 RX ORDER — SODIUM CHLORIDE 0.9 % (FLUSH) 0.9 %
5-40 SYRINGE (ML) INJECTION PRN
Status: DISCONTINUED | OUTPATIENT
Start: 2023-06-03 | End: 2023-06-05 | Stop reason: HOSPADM

## 2023-06-03 RX ORDER — ONDANSETRON 4 MG/1
4 TABLET, ORALLY DISINTEGRATING ORAL EVERY 8 HOURS PRN
Status: DISCONTINUED | OUTPATIENT
Start: 2023-06-03 | End: 2023-06-05 | Stop reason: HOSPADM

## 2023-06-03 RX ORDER — ATORVASTATIN CALCIUM 20 MG/1
20 TABLET, FILM COATED ORAL NIGHTLY
Status: DISCONTINUED | OUTPATIENT
Start: 2023-06-03 | End: 2023-06-05 | Stop reason: HOSPADM

## 2023-06-03 RX ORDER — BUMETANIDE 1 MG/1
2 TABLET ORAL DAILY
COMMUNITY

## 2023-06-03 RX ORDER — ONDANSETRON 2 MG/ML
4 INJECTION INTRAMUSCULAR; INTRAVENOUS EVERY 6 HOURS PRN
Status: DISCONTINUED | OUTPATIENT
Start: 2023-06-03 | End: 2023-06-05 | Stop reason: HOSPADM

## 2023-06-03 RX ORDER — RIVASTIGMINE TARTRATE 1.5 MG/1
1.5 CAPSULE ORAL 2 TIMES DAILY
COMMUNITY

## 2023-06-03 RX ORDER — SODIUM CHLORIDE 9 MG/ML
INJECTION, SOLUTION INTRAVENOUS PRN
Status: DISCONTINUED | OUTPATIENT
Start: 2023-06-03 | End: 2023-06-05 | Stop reason: HOSPADM

## 2023-06-03 RX ORDER — HYDROXYZINE HYDROCHLORIDE 10 MG/1
10 TABLET, FILM COATED ORAL 3 TIMES DAILY PRN
Status: DISCONTINUED | OUTPATIENT
Start: 2023-06-03 | End: 2023-06-05 | Stop reason: HOSPADM

## 2023-06-03 RX ORDER — DOXYCYCLINE HYCLATE 100 MG/1
100 CAPSULE ORAL 2 TIMES DAILY
Status: DISCONTINUED | OUTPATIENT
Start: 2023-06-03 | End: 2023-06-05 | Stop reason: HOSPADM

## 2023-06-03 RX ORDER — HYDROXYZINE HYDROCHLORIDE 50 MG/ML
25 INJECTION, SOLUTION INTRAMUSCULAR ONCE
Status: COMPLETED | OUTPATIENT
Start: 2023-06-03 | End: 2023-06-03

## 2023-06-03 RX ORDER — DIVALPROEX SODIUM 500 MG/1
1000 TABLET, EXTENDED RELEASE ORAL NIGHTLY
Status: DISCONTINUED | OUTPATIENT
Start: 2023-06-03 | End: 2023-06-05 | Stop reason: HOSPADM

## 2023-06-03 RX ORDER — TRIAMCINOLONE ACETONIDE 1 MG/G
CREAM TOPICAL 3 TIMES DAILY PRN
Status: DISCONTINUED | OUTPATIENT
Start: 2023-06-03 | End: 2023-06-05 | Stop reason: HOSPADM

## 2023-06-03 RX ORDER — ENOXAPARIN SODIUM 100 MG/ML
40 INJECTION SUBCUTANEOUS DAILY
Status: DISCONTINUED | OUTPATIENT
Start: 2023-06-03 | End: 2023-06-05 | Stop reason: HOSPADM

## 2023-06-03 RX ORDER — 0.9 % SODIUM CHLORIDE 0.9 %
500 INTRAVENOUS SOLUTION INTRAVENOUS ONCE
Status: COMPLETED | OUTPATIENT
Start: 2023-06-03 | End: 2023-06-03

## 2023-06-03 RX ORDER — MEGESTROL ACETATE 40 MG/ML
400 SUSPENSION ORAL DAILY
Status: DISCONTINUED | OUTPATIENT
Start: 2023-06-03 | End: 2023-06-05 | Stop reason: HOSPADM

## 2023-06-03 RX ORDER — CITALOPRAM 20 MG/1
40 TABLET ORAL NIGHTLY
Status: DISCONTINUED | OUTPATIENT
Start: 2023-06-03 | End: 2023-06-05 | Stop reason: HOSPADM

## 2023-06-03 RX ORDER — BISACODYL 10 MG
10 SUPPOSITORY, RECTAL RECTAL DAILY PRN
Status: DISCONTINUED | OUTPATIENT
Start: 2023-06-03 | End: 2023-06-05 | Stop reason: HOSPADM

## 2023-06-03 RX ORDER — RISPERIDONE 1 MG/1
2 TABLET ORAL NIGHTLY
Status: DISCONTINUED | OUTPATIENT
Start: 2023-06-03 | End: 2023-06-05 | Stop reason: HOSPADM

## 2023-06-03 RX ORDER — SIMVASTATIN 40 MG
40 TABLET ORAL NIGHTLY
Status: DISCONTINUED | OUTPATIENT
Start: 2023-06-03 | End: 2023-06-03 | Stop reason: CLARIF

## 2023-06-03 RX ORDER — SODIUM CHLORIDE 9 MG/ML
INJECTION, SOLUTION INTRAVENOUS CONTINUOUS
Status: DISCONTINUED | OUTPATIENT
Start: 2023-06-03 | End: 2023-06-03

## 2023-06-03 RX ORDER — SODIUM CHLORIDE, SODIUM LACTATE, POTASSIUM CHLORIDE, CALCIUM CHLORIDE 600; 310; 30; 20 MG/100ML; MG/100ML; MG/100ML; MG/100ML
INJECTION, SOLUTION INTRAVENOUS CONTINUOUS
Status: ACTIVE | OUTPATIENT
Start: 2023-06-03 | End: 2023-06-04

## 2023-06-03 RX ORDER — ACETAMINOPHEN 325 MG/1
650 TABLET ORAL EVERY 6 HOURS PRN
Status: DISCONTINUED | OUTPATIENT
Start: 2023-06-03 | End: 2023-06-05 | Stop reason: HOSPADM

## 2023-06-03 RX ORDER — DEXAMETHASONE SODIUM PHOSPHATE 10 MG/ML
10 INJECTION INTRAMUSCULAR; INTRAVENOUS ONCE
Status: COMPLETED | OUTPATIENT
Start: 2023-06-03 | End: 2023-06-03

## 2023-06-03 RX ORDER — LEVOFLOXACIN 500 MG/1
500 TABLET, FILM COATED ORAL DAILY
Status: DISCONTINUED | OUTPATIENT
Start: 2023-06-03 | End: 2023-06-05 | Stop reason: HOSPADM

## 2023-06-03 RX ORDER — SODIUM CHLORIDE 0.9 % (FLUSH) 0.9 %
5-40 SYRINGE (ML) INJECTION EVERY 12 HOURS SCHEDULED
Status: DISCONTINUED | OUTPATIENT
Start: 2023-06-03 | End: 2023-06-05 | Stop reason: HOSPADM

## 2023-06-03 RX ORDER — CETIRIZINE HYDROCHLORIDE 10 MG/1
5 TABLET ORAL DAILY
Status: DISCONTINUED | OUTPATIENT
Start: 2023-06-03 | End: 2023-06-05 | Stop reason: HOSPADM

## 2023-06-03 RX ORDER — DOCUSATE SODIUM 100 MG/1
100 CAPSULE, LIQUID FILLED ORAL EVERY MORNING
Status: DISCONTINUED | OUTPATIENT
Start: 2023-06-03 | End: 2023-06-05 | Stop reason: HOSPADM

## 2023-06-03 RX ADMIN — ATORVASTATIN CALCIUM 20 MG: 20 TABLET, FILM COATED ORAL at 21:26

## 2023-06-03 RX ADMIN — CARBIDOPA AND LEVODOPA 1 TABLET: 25; 100 TABLET ORAL at 21:24

## 2023-06-03 RX ADMIN — CALCIUM CARBONATE-VITAMIN D TAB 500 MG-200 UNIT 1 TABLET: 500-200 TAB at 08:20

## 2023-06-03 RX ADMIN — CETIRIZINE HYDROCHLORIDE 5 MG: 10 TABLET, FILM COATED ORAL at 08:20

## 2023-06-03 RX ADMIN — CARBIDOPA AND LEVODOPA 1 TABLET: 25; 100 TABLET ORAL at 08:20

## 2023-06-03 RX ADMIN — DEXAMETHASONE SODIUM PHOSPHATE 10 MG: 10 INJECTION INTRAMUSCULAR; INTRAVENOUS at 21:29

## 2023-06-03 RX ADMIN — RISPERIDONE 2 MG: 1 TABLET ORAL at 21:24

## 2023-06-03 RX ADMIN — SODIUM CHLORIDE, PRESERVATIVE FREE 10 ML: 5 INJECTION INTRAVENOUS at 21:28

## 2023-06-03 RX ADMIN — MEGESTROL ACETATE 400 MG: 400 SUSPENSION ORAL at 08:20

## 2023-06-03 RX ADMIN — HYDROXYZINE HYDROCHLORIDE 10 MG: 10 TABLET ORAL at 18:59

## 2023-06-03 RX ADMIN — HYDROXYZINE HYDROCHLORIDE 25 MG: 50 INJECTION, SOLUTION INTRAMUSCULAR at 21:30

## 2023-06-03 RX ADMIN — CITALOPRAM HYDROBROMIDE 40 MG: 20 TABLET ORAL at 21:24

## 2023-06-03 RX ADMIN — LEVOFLOXACIN 500 MG: 500 TABLET, FILM COATED ORAL at 13:15

## 2023-06-03 RX ADMIN — IOPAMIDOL 75 ML: 755 INJECTION, SOLUTION INTRAVENOUS at 03:57

## 2023-06-03 RX ADMIN — DIVALPROEX SODIUM 1000 MG: 500 TABLET, EXTENDED RELEASE ORAL at 21:25

## 2023-06-03 RX ADMIN — CARBIDOPA AND LEVODOPA 1 TABLET: 25; 100 TABLET ORAL at 13:14

## 2023-06-03 RX ADMIN — SODIUM CHLORIDE: 9 INJECTION, SOLUTION INTRAVENOUS at 08:21

## 2023-06-03 RX ADMIN — SODIUM CHLORIDE, PRESERVATIVE FREE 10 ML: 5 INJECTION INTRAVENOUS at 08:21

## 2023-06-03 RX ADMIN — DOXYCYCLINE HYCLATE 100 MG: 100 CAPSULE ORAL at 21:26

## 2023-06-03 RX ADMIN — CEFEPIME 2000 MG: 2 INJECTION, POWDER, FOR SOLUTION INTRAVENOUS at 10:21

## 2023-06-03 RX ADMIN — HYDROCORTISONE SODIUM SUCCINATE 100 MG: 100 INJECTION, POWDER, FOR SOLUTION INTRAMUSCULAR; INTRAVENOUS at 00:40

## 2023-06-03 RX ADMIN — DOCUSATE SODIUM 100 MG: 100 CAPSULE, LIQUID FILLED ORAL at 08:20

## 2023-06-03 RX ADMIN — SODIUM CHLORIDE 500 ML: 9 INJECTION, SOLUTION INTRAVENOUS at 01:00

## 2023-06-03 RX ADMIN — ACETAMINOPHEN 650 MG: 325 TABLET ORAL at 19:00

## 2023-06-03 RX ADMIN — ENOXAPARIN SODIUM 40 MG: 100 INJECTION SUBCUTANEOUS at 08:20

## 2023-06-03 RX ADMIN — DOXYCYCLINE HYCLATE 100 MG: 100 CAPSULE ORAL at 13:14

## 2023-06-03 ASSESSMENT — PAIN SCALES - GENERAL
PAINLEVEL_OUTOF10: 0
PAINLEVEL_OUTOF10: 5

## 2023-06-04 LAB
ALBUMIN SERPL-MCNC: 3.4 G/DL (ref 3.5–5.2)
ALP SERPL-CCNC: 37 U/L (ref 35–104)
ALT SERPL-CCNC: <5 U/L (ref 0–32)
ANION GAP SERPL CALCULATED.3IONS-SCNC: 10 MMOL/L (ref 7–16)
AST SERPL-CCNC: 22 U/L (ref 0–31)
BASOPHILS # BLD: 0.04 E9/L (ref 0–0.2)
BASOPHILS NFR BLD: 0.4 % (ref 0–2)
BILIRUB SERPL-MCNC: <0.2 MG/DL (ref 0–1.2)
BUN SERPL-MCNC: 20 MG/DL (ref 6–23)
CALCIUM SERPL-MCNC: 9.2 MG/DL (ref 8.6–10.2)
CHLORIDE SERPL-SCNC: 110 MMOL/L (ref 98–107)
CO2 SERPL-SCNC: 23 MMOL/L (ref 22–29)
CREAT SERPL-MCNC: 0.8 MG/DL (ref 0.5–1)
EOSINOPHIL # BLD: 0 E9/L (ref 0.05–0.5)
EOSINOPHIL NFR BLD: 0 % (ref 0–6)
ERYTHROCYTE [DISTWIDTH] IN BLOOD BY AUTOMATED COUNT: 14.5 FL (ref 11.5–15)
GLUCOSE SERPL-MCNC: 115 MG/DL (ref 74–99)
HCT VFR BLD AUTO: 34 % (ref 34–48)
HGB BLD-MCNC: 11.1 G/DL (ref 11.5–15.5)
IMM GRANULOCYTES # BLD: 0.31 E9/L
IMM GRANULOCYTES NFR BLD: 3 % (ref 0–5)
LYMPHOCYTES # BLD: 1.42 E9/L (ref 1.5–4)
LYMPHOCYTES NFR BLD: 13.5 % (ref 20–42)
MCH RBC QN AUTO: 31.1 PG (ref 26–35)
MCHC RBC AUTO-ENTMCNC: 32.6 % (ref 32–34.5)
MCV RBC AUTO: 95.2 FL (ref 80–99.9)
MONOCYTES # BLD: 0.43 E9/L (ref 0.1–0.95)
MONOCYTES NFR BLD: 4.1 % (ref 2–12)
NEUTROPHILS # BLD: 8.28 E9/L (ref 1.8–7.3)
NEUTS SEG NFR BLD: 79 % (ref 43–80)
PLATELET # BLD AUTO: 212 E9/L (ref 130–450)
PMV BLD AUTO: 10.3 FL (ref 7–12)
POTASSIUM SERPL-SCNC: 4.2 MMOL/L (ref 3.5–5)
PROT SERPL-MCNC: 6.4 G/DL (ref 6.4–8.3)
RBC # BLD AUTO: 3.57 E12/L (ref 3.5–5.5)
SODIUM SERPL-SCNC: 143 MMOL/L (ref 132–146)
WBC # BLD: 10.5 E9/L (ref 4.5–11.5)

## 2023-06-04 PROCEDURE — 80053 COMPREHEN METABOLIC PANEL: CPT

## 2023-06-04 PROCEDURE — 6360000002 HC RX W HCPCS: Performed by: NURSE PRACTITIONER

## 2023-06-04 PROCEDURE — 85025 COMPLETE CBC W/AUTO DIFF WBC: CPT

## 2023-06-04 PROCEDURE — 2060000000 HC ICU INTERMEDIATE R&B

## 2023-06-04 PROCEDURE — 36415 COLL VENOUS BLD VENIPUNCTURE: CPT

## 2023-06-04 PROCEDURE — 6370000000 HC RX 637 (ALT 250 FOR IP): Performed by: NURSE PRACTITIONER

## 2023-06-04 PROCEDURE — 2580000003 HC RX 258: Performed by: NURSE PRACTITIONER

## 2023-06-04 PROCEDURE — 6370000000 HC RX 637 (ALT 250 FOR IP): Performed by: SPECIALIST

## 2023-06-04 RX ORDER — DOXYCYCLINE HYCLATE 100 MG/1
100 CAPSULE ORAL 2 TIMES DAILY
Qty: 14 CAPSULE | Refills: 0 | Status: SHIPPED | OUTPATIENT
Start: 2023-06-04 | End: 2023-06-11

## 2023-06-04 RX ORDER — LEVOFLOXACIN 500 MG/1
500 TABLET, FILM COATED ORAL DAILY
Qty: 7 TABLET | Refills: 0 | Status: SHIPPED | OUTPATIENT
Start: 2023-06-05 | End: 2023-06-12

## 2023-06-04 RX ADMIN — ATORVASTATIN CALCIUM 20 MG: 20 TABLET, FILM COATED ORAL at 21:44

## 2023-06-04 RX ADMIN — LEVOFLOXACIN 500 MG: 500 TABLET, FILM COATED ORAL at 09:17

## 2023-06-04 RX ADMIN — CARBIDOPA AND LEVODOPA 1 TABLET: 25; 100 TABLET ORAL at 21:42

## 2023-06-04 RX ADMIN — MEGESTROL ACETATE 400 MG: 400 SUSPENSION ORAL at 09:17

## 2023-06-04 RX ADMIN — CALCIUM CARBONATE-VITAMIN D TAB 500 MG-200 UNIT 1 TABLET: 500-200 TAB at 09:17

## 2023-06-04 RX ADMIN — CETIRIZINE HYDROCHLORIDE 5 MG: 10 TABLET, FILM COATED ORAL at 09:17

## 2023-06-04 RX ADMIN — DOCUSATE SODIUM 100 MG: 100 CAPSULE, LIQUID FILLED ORAL at 09:17

## 2023-06-04 RX ADMIN — SODIUM CHLORIDE, PRESERVATIVE FREE 10 ML: 5 INJECTION INTRAVENOUS at 09:18

## 2023-06-04 RX ADMIN — CARBIDOPA AND LEVODOPA 1 TABLET: 25; 100 TABLET ORAL at 09:17

## 2023-06-04 RX ADMIN — DOXYCYCLINE HYCLATE 100 MG: 100 CAPSULE ORAL at 21:42

## 2023-06-04 RX ADMIN — RISPERIDONE 2 MG: 1 TABLET ORAL at 21:43

## 2023-06-04 RX ADMIN — SODIUM CHLORIDE, PRESERVATIVE FREE 10 ML: 5 INJECTION INTRAVENOUS at 21:44

## 2023-06-04 RX ADMIN — DOXYCYCLINE HYCLATE 100 MG: 100 CAPSULE ORAL at 09:17

## 2023-06-04 RX ADMIN — CITALOPRAM HYDROBROMIDE 40 MG: 20 TABLET ORAL at 21:42

## 2023-06-04 RX ADMIN — DIVALPROEX SODIUM 1000 MG: 500 TABLET, EXTENDED RELEASE ORAL at 21:43

## 2023-06-04 RX ADMIN — ENOXAPARIN SODIUM 40 MG: 100 INJECTION SUBCUTANEOUS at 09:18

## 2023-06-04 RX ADMIN — CARBIDOPA AND LEVODOPA 1 TABLET: 25; 100 TABLET ORAL at 13:36

## 2023-06-04 ASSESSMENT — PAIN SCALES - GENERAL
PAINLEVEL_OUTOF10: 0

## 2023-06-05 VITALS
OXYGEN SATURATION: 100 % | SYSTOLIC BLOOD PRESSURE: 102 MMHG | HEART RATE: 69 BPM | RESPIRATION RATE: 18 BRPM | DIASTOLIC BLOOD PRESSURE: 62 MMHG | BODY MASS INDEX: 36.93 KG/M2 | TEMPERATURE: 98.3 F | HEIGHT: 59 IN | WEIGHT: 183.2 LBS

## 2023-06-05 LAB
ALBUMIN SERPL-MCNC: 3.3 G/DL (ref 3.5–5.2)
ALP SERPL-CCNC: 33 U/L (ref 35–104)
ALT SERPL-CCNC: <5 U/L (ref 0–32)
ANION GAP SERPL CALCULATED.3IONS-SCNC: 10 MMOL/L (ref 7–16)
AST SERPL-CCNC: 18 U/L (ref 0–31)
BACTERIA UR CULT: NORMAL
BASOPHILS # BLD: 0.04 E9/L (ref 0–0.2)
BASOPHILS NFR BLD: 0.4 % (ref 0–2)
BILIRUB SERPL-MCNC: <0.2 MG/DL (ref 0–1.2)
BUN SERPL-MCNC: 19 MG/DL (ref 6–23)
CALCIUM SERPL-MCNC: 8.8 MG/DL (ref 8.6–10.2)
CHLORIDE SERPL-SCNC: 109 MMOL/L (ref 98–107)
CO2 SERPL-SCNC: 23 MMOL/L (ref 22–29)
CREAT SERPL-MCNC: 0.8 MG/DL (ref 0.5–1)
EOSINOPHIL # BLD: 0.11 E9/L (ref 0.05–0.5)
EOSINOPHIL NFR BLD: 1.2 % (ref 0–6)
ERYTHROCYTE [DISTWIDTH] IN BLOOD BY AUTOMATED COUNT: 14.6 FL (ref 11.5–15)
GLUCOSE SERPL-MCNC: 101 MG/DL (ref 74–99)
HCT VFR BLD AUTO: 35.2 % (ref 34–48)
HGB BLD-MCNC: 11.3 G/DL (ref 11.5–15.5)
IMM GRANULOCYTES # BLD: 0.33 E9/L
IMM GRANULOCYTES NFR BLD: 3.6 % (ref 0–5)
LYMPHOCYTES # BLD: 3.03 E9/L (ref 1.5–4)
LYMPHOCYTES NFR BLD: 33.3 % (ref 20–42)
MCH RBC QN AUTO: 30.7 PG (ref 26–35)
MCHC RBC AUTO-ENTMCNC: 32.1 % (ref 32–34.5)
MCV RBC AUTO: 95.7 FL (ref 80–99.9)
MONOCYTES # BLD: 0.75 E9/L (ref 0.1–0.95)
MONOCYTES NFR BLD: 8.2 % (ref 2–12)
NEUTROPHILS # BLD: 4.84 E9/L (ref 1.8–7.3)
NEUTS SEG NFR BLD: 53.3 % (ref 43–80)
PLATELET # BLD AUTO: 223 E9/L (ref 130–450)
PMV BLD AUTO: 10 FL (ref 7–12)
POTASSIUM SERPL-SCNC: 3.9 MMOL/L (ref 3.5–5)
PROT SERPL-MCNC: 5.9 G/DL (ref 6.4–8.3)
RBC # BLD AUTO: 3.68 E12/L (ref 3.5–5.5)
SODIUM SERPL-SCNC: 142 MMOL/L (ref 132–146)
WBC # BLD: 9.1 E9/L (ref 4.5–11.5)

## 2023-06-05 PROCEDURE — 6370000000 HC RX 637 (ALT 250 FOR IP): Performed by: NURSE PRACTITIONER

## 2023-06-05 PROCEDURE — 2580000003 HC RX 258: Performed by: NURSE PRACTITIONER

## 2023-06-05 PROCEDURE — 6370000000 HC RX 637 (ALT 250 FOR IP): Performed by: SPECIALIST

## 2023-06-05 PROCEDURE — 6360000002 HC RX W HCPCS: Performed by: NURSE PRACTITIONER

## 2023-06-05 PROCEDURE — 80053 COMPREHEN METABOLIC PANEL: CPT

## 2023-06-05 PROCEDURE — 85025 COMPLETE CBC W/AUTO DIFF WBC: CPT

## 2023-06-05 PROCEDURE — 36415 COLL VENOUS BLD VENIPUNCTURE: CPT

## 2023-06-05 RX ADMIN — DOCUSATE SODIUM 100 MG: 100 CAPSULE, LIQUID FILLED ORAL at 08:24

## 2023-06-05 RX ADMIN — LEVOFLOXACIN 500 MG: 500 TABLET, FILM COATED ORAL at 08:24

## 2023-06-05 RX ADMIN — ENOXAPARIN SODIUM 40 MG: 100 INJECTION SUBCUTANEOUS at 08:24

## 2023-06-05 RX ADMIN — CARBIDOPA AND LEVODOPA 1 TABLET: 25; 100 TABLET ORAL at 08:24

## 2023-06-05 RX ADMIN — CALCIUM CARBONATE-VITAMIN D TAB 500 MG-200 UNIT 1 TABLET: 500-200 TAB at 08:24

## 2023-06-05 RX ADMIN — MEGESTROL ACETATE 400 MG: 400 SUSPENSION ORAL at 08:24

## 2023-06-05 RX ADMIN — SODIUM CHLORIDE, PRESERVATIVE FREE 10 ML: 5 INJECTION INTRAVENOUS at 08:25

## 2023-06-05 RX ADMIN — CETIRIZINE HYDROCHLORIDE 5 MG: 10 TABLET, FILM COATED ORAL at 08:24

## 2023-06-05 RX ADMIN — DOXYCYCLINE HYCLATE 100 MG: 100 CAPSULE ORAL at 08:24

## 2023-06-05 ASSESSMENT — PAIN SCALES - GENERAL
PAINLEVEL_OUTOF10: 0
PAINLEVEL_OUTOF10: 0

## 2023-06-05 NOTE — PROGRESS NOTES
Tests Review:  Lab Results   Component Value Date    WBC 9.1 06/05/2023    WBC 10.5 06/04/2023    WBC 19.9 (H) 06/03/2023    HGB 11.3 (L) 06/05/2023    HCT 35.2 06/05/2023    MCV 95.7 06/05/2023     06/05/2023     Lab Results   Component Value Date    NEUTROABS 4.84 06/05/2023    NEUTROABS 8.28 (H) 06/04/2023    NEUTROABS 18.32 (H) 06/03/2023     No results found for: CRPHS  Lab Results   Component Value Date    ALT <5 06/05/2023    AST 18 06/05/2023    ALKPHOS 33 (L) 06/05/2023    BILITOT <0.2 06/05/2023     Lab Results   Component Value Date/Time     06/05/2023 04:07 AM    K 3.9 06/05/2023 04:07 AM    K 4.1 06/03/2023 07:25 AM     06/05/2023 04:07 AM    CO2 23 06/05/2023 04:07 AM    BUN 19 06/05/2023 04:07 AM    CREATININE 0.8 06/05/2023 04:07 AM    CREATININE 0.8 06/04/2023 11:30 AM    CREATININE 0.9 06/03/2023 07:25 AM    GFRAA >60 07/07/2022 05:40 AM    LABGLOM >60 06/05/2023 04:07 AM    GLUCOSE 101 06/05/2023 04:07 AM    PROT 5.9 06/05/2023 04:07 AM    LABALBU 3.3 06/05/2023 04:07 AM    CALCIUM 8.8 06/05/2023 04:07 AM    BILITOT <0.2 06/05/2023 04:07 AM    ALKPHOS 33 06/05/2023 04:07 AM    AST 18 06/05/2023 04:07 AM    ALT <5 06/05/2023 04:07 AM     Lab Results   Component Value Date    CRP 2.8 (H) 06/02/2023     Lab Results   Component Value Date    SEDRATE 33 (H) 06/02/2023     Radiology:      Microbiology:   Rapid influenza: Negative  Rapid SARS-CoV-2: Negative  Blood culture 6/2/2023: Negative so far  Urine culture 6/2/2023: Negative so far    ASSESSMENT:  Cellulitis right lower extremity-resolved  Leukocytosis-resolved    PLAN:  Continue Doxycycline and Levofloxacin x7 days. Reconciled  The patient can be discharged from [address] [place] [address] with nursing.     Ratna Lambert MD  11:11 AM  6/5/2023

## 2023-06-05 NOTE — PROGRESS NOTES
Report called to RN [facility] @ [address]  Paperwork faxed to [phone]

## 2023-06-05 NOTE — CARE COORDINATION
CASE MANAGEMENT. ... In anticipation for dc today, transport arrangements set up for patient to return to [facility] with [facility] for 3pm. Nursing and facility notified and nursing will updated patients guardian. N 16 in epic. Forms in chart.

## 2023-06-05 NOTE — PLAN OF CARE
Problem: Discharge Planning  Goal: Discharge to home or other facility with appropriate resources  6/5/2023 0917 by Mercedes Salas RN  Outcome: Progressing     Problem: Pain  Goal: Verbalizes/displays adequate comfort level or baseline comfort level  6/5/2023 0917 by Mercedes Salas RN  Outcome: Progressing     Problem: Safety - Adult  Goal: Free from fall injury  6/5/2023 0917 by Mercedes Salas RN  Outcome: Progressing     Problem: ABCDS Injury Assessment  Goal: Absence of physical injury  6/5/2023 0917 by Mercedes Salas RN  Outcome: Progressing

## 2023-06-05 NOTE — DISCHARGE SUMMARY
evidence of an acute infarct. There is no evidence of hydrocephalus. Moderate/severe atrophy. ORBITS: The visualized portion of the orbits demonstrate no acute abnormality. SINUSES: The visualized paranasal sinuses and mastoid air cells demonstrate no acute abnormality. SOFT TISSUES/SKULL:  No acute abnormality of the visualized skull or soft tissues. No acute intracranial abnormality. RECOMMENDATIONS: Careful clinical correlation and follow up recommended. CT ABDOMEN PELVIS W IV CONTRAST Additional Contrast? None    Result Date: 6/3/2023  EXAMINATION: CT OF THE ABDOMEN AND PELVIS WITH CONTRAST 6/3/2023 3:51 am TECHNIQUE: CT of the abdomen and pelvis was performed with the administration of intravenous contrast. Multiplanar reformatted images are provided for review. Automated exposure control, iterative reconstruction, and/or weight based adjustment of the mA/kV was utilized to reduce the radiation dose to as low as reasonably achievable. COMPARISON: None. HISTORY: ORDERING SYSTEM PROVIDED HISTORY: Fever, AMS, diffuse abd tenderness TECHNOLOGIST PROVIDED HISTORY: Reason for exam:->Fever, AMS, diffuse abd tenderness Additional Contrast?->None Decision Support Exception - unselect if not a suspected or confirmed emergency medical condition->Emergency Medical Condition (MA) FINDINGS: Lower Chest: No infiltrate or effusion. Organs: Liver, gallbladder, biliary tree, bilateral adrenal glands, bilateral kidneys, spleen and pancreas are normal. GI/Bowel: No ileus or obstruction. No inflammatory bowel process. Appendix not visible. No pericecal inflammatory change to suggest occult appendicitis. Pelvis: No adnexal mass or significant pelvic free fluid. No adenopathy. Peritoneum/Retroperitoneum: No hernia. Normal aorta. No adenopathy or fluid. Bones/Soft Tissues: No acute osseous or body wall soft tissue abnormality.  Chronic bilateral L5 pars defects with mild 5 mm anterolisthesis L5 on S1.     1. No acute

## 2023-06-05 NOTE — PROGRESS NOTES
Patient's guardian/sister Emeterio Angel at bedside, notified of discharge plans and  time today @ 3.

## 2023-06-08 LAB
BACTERIA BLD CULT ORG #2: NORMAL
BACTERIA BLD CULT: NORMAL

## 2023-07-24 LAB — VALPROATE SERPL-MCNC: 53 UG/ML (ref 50–100)

## 2023-09-05 NOTE — CONSULTS
History and Physical    Patient's Name/Date of Birth: Herb Hernandez / 1990, (29 y.o.), female    Date: 2023     Chief Complaint: labor    HPI:   28 yo  at 38w5d presents in labor, no leaking or bleeding, good mov, no fevers, no uti uri cns or covid symptoms. Prenatal care unremarkable, gbs neg    Past Medical History:   Diagnosis Date    DDD (degenerative disc disease), lumbar 3/8/2020    KATHLEEN (generalized anxiety disorder) 2020    MDD (major depressive disorder), recurrent episode, moderate ([address]) 2020    Mild intermittent asthma 2020       History reviewed. No pertinent surgical history.     Current Facility-Administered Medications   Medication Dose Route Frequency Provider Last Rate Last Admin    lactated ringers IV soln infusion   IntraVENous Continuous Alfonzo Pedraza MD        lactated ringers bolus bolus 500 mL  500 mL IntraVENous PRN Alfonzo Pedraza .8 mL/hr at 23 0011 1,000 mL at 23 0011    Or    lactated ringers bolus bolus 1,000 mL  1,000 mL IntraVENous PRN Alfonzo Pedraza MD        sodium chloride flush 0.9 % injection 5-40 mL  5-40 mL IntraVENous 2 times per day Alfonzo Pedraza MD        sodium chloride flush 0.9 % injection 5-40 mL  5-40 mL IntraVENous PRN Alfonzo Pedraza MD        0.9 % sodium chloride infusion  25 mL IntraVENous PRN Alfonzo Pedraza MD        ondansetron [facility]) injection 4 mg  4 mg IntraVENous Q6H PRN Alfonzo Pedraza MD   4 mg at 23 0221    oxytocin (PITOCIN) 30 units in 500 mL infusion  87.3 pauline-units/min IntraVENous Continuous PRN Alfonzo Pedraza MD        And    oxytocin (PITOCIN) 10 unit bolus from the bag  10 Units IntraVENous PRN Alfonzo Pedraza MD        methylergonovine (METHERGINE) injection 200 mcg  200 mcg IntraMUSCular PRN Alfnozo Pedraza MD        carboprost (HEMABATE) injection 250 mcg  250 mcg IntraMUSCular PRN Alfonzo Pedraza MD        miSOPROStol (CYTOTEC) tablet 800 mcg  800 mcg Rectal PRN Jomar Porter [facility] Consultation Note    Reason for Consult:  R peroneal DVT    HPI:    The pt is a 71 y/o F that is a poor historian due to Parkinson's Dementia. She says that she fell yesterday at her facility and hit her head on the dresser. She denies LOC. She was taken to [facility] for evaluation and was found to have a SDH. She complains of neck pain as well which is chronic and unchanged     Per chart review, she was admitted 5 days ago to [facility] for history of 4 falls and was found to have a negative workup, but was found to have a DVT in her right peroneal vein. She was started on Eliquis at that time and developed a rash so was switched to Xarelto and then finally to therapeutic Lovenox. Her last dose was yesterday prior to the fall. She was discharged to her facility <24 hours ago when she sustained another fall. She is unable to provide any past medical or surgical hx.       ROS: Negative if blank [], Positive if [x]  General Vascular   [] Fevers [] Claudication (Blocks)   [] Chills [] Rest Pain   [] Weight Loss [] Tissue Loss   [] Chest Pain [] Clotting Disorder   [] SOB at rest [] Leg Swelling   [] SOB with exertion [x] DVT/PE      [] Nausea    [] Vomiting [] Stroke/TIA   [] Abdominal Pain [] Focal weakness   [] Melena [] Slurred Speech   [] Hematochezia [] Vision Changes   [] Hematuria    [] Dysuria [] Hx of Central Catheters   [] Wears Glasses/Contacts [] Dialysis and If so date initiated   [] Blindness    [x] Right Hand Dominant   [] Difficulty swallowing        Past Medical History:   Diagnosis Date    Anxiety     Arthritis     Asthma     Dementia ([facility] [address]     Depression     Depression     Hyperlipidemia     Mild mental retardation     Osteoporosis     Parkinson disease ([facility] [address]         Past Surgical History:   Procedure Laterality Date    HERNIA REPAIR      TONSILLECTOMY         Current Medications:      sodium chloride flush, ondansetron **OR** ondansetron    sodium chloride flush  10 mL Intravenous 2 times per day    acetaminophen  650 mg Oral Q4H    bisacodyl  10 mg Oral Daily    sennosides-docusate sodium  2 tablet Oral BID        Allergies:  Pcn [penicillins]    Social History     Socioeconomic History    Marital status: Single     Spouse name: Not on file    Number of children: Not on file    Years of education: Not on file    Highest education level: Not on file   Occupational History    Not on file   Social Needs    Financial resource strain: Not on file    Food insecurity     Worry: Not on file     Inability: Not on file    Transportation needs     Medical: Not on file     Non-medical: Not on file   Tobacco Use    Smoking status: Never Smoker    Smokeless tobacco: Never Used   Substance and Sexual Activity    Alcohol use: No    Drug use: No    Sexual activity: Never   Lifestyle    Physical activity     Days per week: Not on file     Minutes per session: Not on file    Stress: Not on file   Relationships    Social connections     Talks on phone: Not on file     Gets together: Not on file     Attends Denominational service: Not on file     Active member of club or organization: Not on file     Attends meetings of clubs or organizations: Not on file     Relationship status: Not on file    Intimate partner violence     Fear of current or ex partner: Not on file     Emotionally abused: Not on file     Physically abused: Not on file     Forced sexual activity: Not on file   Other Topics Concern    Not on file   Social History Narrative    Not on file        Family History   Family history unknown: Yes       PHYSICAL EXAM:    BP (!) 142/60   Pulse 93   Temp 97.7 °F (36.5 °C)   Resp 16   Ht 5' 5\" (1.651 m)   Wt 104 lb (47.2 kg)   SpO2 94%   BMI 17.31 kg/m²   CONSTITUTIONAL:  awake, alert, cooperative, no apparent distress, and appears stated age  NEURO:  Normal  EYES:  lids and lashes normal, sclera clear and conjunctiva normal  ENT:  normocepalic, without obvious abnormality, external ears without lesions  NECK:  supple, symmetrical, trachea midline, no jugular venous distension, no carotid bruits  LUNGS:  no increased work of breathing  CARDIOVASCULAR:  regular rate and rhythm  ABDOMEN:  soft, non-distended, non-tender, Aorta is not palpable  SKIN:  normal skin color, texture, turgor    EXTREMITIES:    R UE Swelling absent Incisions absent       5/5 Strength    L UE Swelling absent Incisions absent       5/5 Strength    R LE Edema absent     Incisions absent    Varicose veins absent    Wounds present, multicolored pigmented rash over legs   normalcaprefill   5/5 Strength   Neuropathy is absent    L LE Edema absent     Incisions absent    Varicose veins absent    Wounds present multicolored pigmented rash over legs   normalcaprefill   5/5 Strength   Neuropathy is absent    R brachial 2 L brachial 2   R radial 2 L radial 2   R femoral 2 L femoral 2   R popliteal  L popliteal    R posterior tibial 1 L posterior tibial 1   R dorsalis pedis 1 L dorsalis pedis 1       LABS:    Lab Results   Component Value Date    WBC 8.0 02/27/2021    HGB 11.2 (L) 02/27/2021    HCT 33.4 (L) 02/27/2021     02/27/2021    PROTIME 13.9 (H) 02/27/2021    INR 1.2 02/27/2021    K 3.9 02/27/2021    BUN 17 02/27/2021    CREATININE 0.5 02/27/2021       RADIOLOGY:  Xr Pelvis (1-2 Views)    Result Date: 2/27/2021  EXAMINATION: ONE XRAY VIEW OF THE PELVIS 2/27/2021 7:31 am COMPARISON: None. HISTORY: ORDERING SYSTEM PROVIDED HISTORY: fall TECHNOLOGIST PROVIDED HISTORY: Reason for exam:->fall FINDINGS: Degenerative changes are present in the spine and pelvis. No bony destruction, dislocation or acute fracture identified. No acute bony abnormality.   MRI or CT would be useful if symptoms persist.    Xr Knee Left (3 Views)    Result Date: 2/27/2021  EXAMINATION: THREE XRAY VIEWS OF THE LEFT KNEE 2/27/2021 7:31 am COMPARISON: 10/01/2014 HISTORY: ORDERING SYSTEM PROVIDED HISTORY: fall, pain TECHNOLOGIST PROVIDED HISTORY: Reason for exam:->fall, pain FINDINGS: Moderately advanced tricompartmental degenerative changes are similar to previous. Probable geode in the proximal tibia is similar to previous. No joint effusion. No acute fracture. Similar chronic appearing findings. MRI would be useful if symptoms persist.    Ct Head Wo Contrast    Result Date: 2/27/2021  EXAMINATION: CT OF THE HEAD WITHOUT CONTRAST  2/27/2021 1:40 pm TECHNIQUE: CT of the head was performed without the administration of intravenous contrast. Dose modulation, iterative reconstruction, and/or weight based adjustment of the mA/kV was utilized to reduce the radiation dose to as low as reasonably achievable. COMPARISON: Today at 0927 hours HISTORY: ORDERING SYSTEM PROVIDED HISTORY: [facility] TECHNOLOGIST PROVIDED HISTORY: Has a \"code stroke\" or \"stroke alert\" been called? ->No Reason for exam:->[facility] Decision Support Exception->Emergency Medical Condition (MA) What reading provider will be dictating this exam?->CRC FINDINGS: Stable left frontal subdural hematoma measures approximately 2 mm in thickness. Stable subdural hemorrhage along anterior falx within interhemispheric fissure. Minimal subdural hemorrhage overlies right parietal lobe measuring 2 mm in thickness. No new intracranial hemorrhage. No intracranial edema. Areas of chronic microvascular ischemia are present in periventricular white matter. Stable left frontal, right parietal, and parafalcine subdural hematomas. No new intracranial hemorrhage or edema. Ct Head Wo Contrast    Result Date: 2/27/2021  EXAMINATION: CT OF THE HEAD WITHOUT CONTRAST  2/27/2021 9:25 am TECHNIQUE: CT of the head was performed without the administration of intravenous contrast. Dose modulation, iterative reconstruction, and/or weight based adjustment of the mA/kV was utilized to reduce the radiation dose to as low as reasonably achievable.  COMPARISON: February 21, 2021 HISTORY: [address] PROVIDED HISTORY: fall TECHNOLOGIST PROVIDED HISTORY: Reason for exam:->fall Has a \"code stroke\" or \"stroke alert\" been called? ->No Decision Support Exception->Emergency Medical Condition (MA) FINDINGS: New subdural hemorrhage overlies the left frontal lobe measuring approximately 2 mm in thickness. Subdural hemorrhage is also located within anterior interhemispheric fissure measuring approximately 4 mm in thickness. No mass effect. No midline shift. Basilar cisterns are patent. Redemonstration of areas of hypoattenuation in periventricular white matter suggestive of chronic microvascular ischemia. There is no depressed calvarial fracture. 1.  Subdural hematomas overly left frontal lobe and anterior falx. 2.  No mass effect or midline shift. Critical results were called by Dr. Dallin Arzate to Dr. Fatou Forbes on 2/27/2021 at 09:52. Ct Cervical Spine Wo Contrast    Result Date: 2/27/2021  EXAMINATION: CT OF THE CERVICAL SPINE WITHOUT CONTRAST 2/27/2021 9:25 am TECHNIQUE: CT of the cervical spine was performed without the administration of intravenous contrast. Multiplanar reformatted images are provided for review. Dose modulation, iterative reconstruction, and/or weight based adjustment of the mA/kV was utilized to reduce the radiation dose to as low as reasonably achievable. COMPARISON: February 21, 2021 HISTORY: ORDERING SYSTEM PROVIDED HISTORY: fall TECHNOLOGIST PROVIDED HISTORY: Reason for exam:->fall Decision Support Exception->Emergency Medical Condition (MA) FINDINGS: BONES/ALIGNMENT: There is no acute fracture or traumatic malalignment. DEGENERATIVE CHANGES: Stable minimal anterolisthesis of C3 on C4 of approximately 3 mm which is likely degenerative in etiology given significant facet arthropathy. Severe disc space narrowing at C5/C6 with subchondral sclerosis and marginal osteophytosis. SOFT TISSUES: There is no prevertebral soft tissue swelling.     No acute abnormality of the cervical spine.    Xr Chest Portable    Result Date: 2/27/2021  EXAMINATION: ONE XRAY VIEW OF THE CHEST 2/27/2021 10:23 am COMPARISON: February 21, 2021 HISTORY: ORDERING SYSTEM PROVIDED HISTORY: chest pain TECHNOLOGIST PROVIDED HISTORY: Reason for exam:->chest pain FINDINGS: Redemonstration of interstitial prominence in perihilar and infrahilar locations. No airspace opacity or pleural effusion. The heart is normal size. No pneumothorax. Redemonstration of interstitial prominence in perihilar locations which could suggest peribronchial inflammatory changes or pulmonary vascular congestion. No focal airspace opacity or pleural effusion. Assesment/Plan  70 y.o. female with R peroneal DVT, frequent falls, and SDH    - Okay for diet today  - Repeat DVT US to see if any extension of DVT  - NPO at midnight for procedure  - Plan for IVC filter placement tomorrow  - Unable to anticoagulate due to SDH and frequent falls  - Discussed with Dr. Jenet Goldberg, MD  2/27/21  2:29 PM EST    Patient was seen and examined. The family was at the bedside. I also spoke with the resident extensively yesterday. This patient has a history of a peroneal DVT. Currently cannot be anticoagulated they asked us to consider a filter. We repeated the ultrasound demonstrating no evidence of DVT. From our standpoint we will hold off on the filter. I recommend repeating the ultrasound examination in 5 days to see if there is any new DVT. Certainly she could be at risk for further development of deep venous thrombosis. If she can I recommend some regular ambulation although she is a fall where risk and has frequent falls and suffers from Parkinson's and dementia. On physical examination she is awake she is fairly alert. She cannot really answer questions very well. She has tremor that is noted.   Skin is warm and dry no change in turgor no jaundice no scleral icterus  HEENT head is normocephalic atraumatic trach is midline no jugular venous distention  Lower extremities demonstrate no significant swelling. Motor is intact. She has palpable pulses in the bilateral brachial radials and DPs and PTs. Please see the above examination by the resident. I personally concur with the examination. I have also personally reviewed the last 2 ultrasound examinations. The most recent demonstrated no evidence of DVT. From our standpoint I recommend conservative therapy repeat the ultrasound in 5 days please call if there is any questions or concerns if she were to develop a DVT then we can consider inferior vena cava filter placement.     This was explained to the family they understand all questions were answered according to their satisfaction    Steve Glover

## 2024-08-06 LAB
BACTERIA URNS QL MICRO: ABNORMAL
BILIRUB UR QL STRIP: NEGATIVE
CLARITY UR: ABNORMAL
COLOR UR: YELLOW
EPI CELLS #/AREA URNS HPF: ABNORMAL /HPF
GLUCOSE UR STRIP-MCNC: NEGATIVE MG/DL
HGB UR QL STRIP.AUTO: NEGATIVE
KETONES UR STRIP-MCNC: NEGATIVE MG/DL
LEUKOCYTE ESTERASE UR QL STRIP: NEGATIVE
NITRITE UR QL STRIP: POSITIVE
PH UR STRIP: 6 [PH] (ref 5–9)
PROT UR STRIP-MCNC: NEGATIVE MG/DL
RBC #/AREA URNS HPF: ABNORMAL /HPF
SP GR UR STRIP: 1.02 (ref 1–1.03)
UROBILINOGEN UR STRIP-ACNC: 0.2 EU/DL (ref 0–1)
WBC #/AREA URNS HPF: ABNORMAL /HPF

## 2024-08-08 LAB
MICROORGANISM SPEC CULT: ABNORMAL
SPECIMEN DESCRIPTION: ABNORMAL

## 2024-08-27 LAB
BACTERIA URNS QL MICRO: ABNORMAL
BILIRUB UR QL STRIP: NEGATIVE
CLARITY UR: CLEAR
COLOR UR: YELLOW
EPI CELLS #/AREA URNS HPF: ABNORMAL /HPF
GLUCOSE UR STRIP-MCNC: NEGATIVE MG/DL
HGB UR QL STRIP.AUTO: NEGATIVE
KETONES UR STRIP-MCNC: NEGATIVE MG/DL
LEUKOCYTE ESTERASE UR QL STRIP: NEGATIVE
NITRITE UR QL STRIP: NEGATIVE
PH UR STRIP: 6 [PH] (ref 5–9)
PROT UR STRIP-MCNC: NEGATIVE MG/DL
RBC #/AREA URNS HPF: ABNORMAL /HPF
SP GR UR STRIP: 1.02 (ref 1–1.03)
UROBILINOGEN UR STRIP-ACNC: 0.2 EU/DL (ref 0–1)
WBC #/AREA URNS HPF: ABNORMAL /HPF

## 2024-08-28 LAB
MICROORGANISM SPEC CULT: ABNORMAL
SPECIMEN DESCRIPTION: ABNORMAL

## 2024-09-21 NOTE — PROGRESS NOTES
Occupational Therapy  OT BEDSIDE TREATMENT NOTE      Date:2021  Patient Name: Sydney Lazaro  MRN: [id number]  : 1945  Room: [address] [place] [address]-A     Referring Provider: Kevin Otto MD  Evaluating OT: Nga Cobian. Junior, OTR/L - [id number]     AM-PAC Daily Activity Raw Score: 13/24     Recommended Adaptive Equipment: Continue to assess.      Diagnosis: Unable to ambulate [R26.2]               Patient presented to hospital s/p fall at [facility]; patient found to have R LE DVT. Pertinent Medical History: dementia, Parkinson's disease, anxiety, osteoporosis, depression, arthritis     Precautions: falls     Home Living: Patient reported that she is a resident of [facility] [facility]. Equipment Owned: walker     Prior Level of Function (PLOF): Patient reported that [facility] staff members assist her with completion of ADLs, as needed. Patient stated that she had used a walker for functional mobility at the [facility]. Patient is a questionable historian; no family/caregiver present to verify information gathered.     Pain Level: Pt did not report pain. Cognition: Pt awake and agreeable to therapy. Pt non verbal throughout most of the session. Able to follow directions but did not verbally respond to questions until after sitting in the chair and stated she wanted to watch [place].      Functional Assessment:    Initial Eval Status  Date: 2021 Treatment Status    Short Term Goals   Feeding SBA   Setup   Grooming Mod A  max  A Setup  (seated)   UB Dressing Mod A Mod A to arrange  gown when seated on the side of the bed.   SBA   LB Dressing Max A  max A don clean brief. Max A to arrange over hips when standing. Posterior lean during static standing. Min  A for balance. Min A - with use of AE, as needed/appropriate   Bathing Max A   Min A - with use of AE/DME, as needed/appropriate   Toileting Max A Max A .   Min A   Bed Mobility  Supine-to-Sit: Max A  Sit-to-Supine: Max A  Log Rolling: Mod A for adjustment of bed linens.   Mod A supine to sit       Functional Transfers Sit-to-Stand: Mod A   from EOB  Posterior lean demonstrated. Min A transfer from bed. CGA   Functional Mobility Patient unable to take side steps toward HOB secondary to posterior lean. Mod A pivot to the chair.   CGA with functional mobility (with device, as needed/appropriate) in order to maximize independence with ADLs and other functional tasks. Balance Sitting: Fair  (at EOB)  Standing: Poor  (with walker) Min A stand balance during ADL activity.   Fair dynamic standing balance during completion of ADLs and other functional tasks. Activity Tolerance Limited secondary to dizziness. Patient tolerated ~8 minutes of EOB sitting. Fair -   Patient will demonstrate Good understanding and consistent implementation of energy conservation techniques and work simplification techniques into ADL routines. B UE Strength Proximally: 3-/5  Distally: 3+/5   Patient will demonstrate 4-/5 distal B UE strength in order to maximize independence with ADLs and functional transfers. Comments:  Pt assisted to recliner chair after completion of ADL activity. Remained in chair with alarm activated. Education/treatment:  ADL retraining with facilitation of movement to increase self care. Therapeutic activity to address balance, strength, and endurance for ADL and transfers. Pt education of transfer safety and daily orientation. · Pt has made  progress towards set goals. Plan of Care: 2-5 days/week for 1-2 weeks PRN  [x]? ?ADL retraining/adaptive techniques and AE recommendations to increase functional independence within precautions  [x]? ? Energy conservation techniques to improve tolerance for ADLs/IADLs  [x]? ? Functional transfer/mobility training/DME recommendations for increased independence, safety and fall prevention  [x]? ?Patient/family education to increase safety and functional independence  [x]? ? Environmental modifications for safe mobility and completion of ADLs/IADLs  []? ?Cognitive retraining exercises to improve problem solving skills & safe participation in ADLs/IADLs   []? ?Sensory re-education techniques to improve extremity awareness, maintain skin integrity and improve hand function   []? ? Visual/Perceptual retraining  to improve body awareness and safety during transfers and ADLs   []? ? Splinting/positioning needs to maintain joint/skin integrity and prevent contractures   [x]? ? Therapeutic activity to improve functional performance during ADLs/IADLs  [x]? ? Therapeutic exercise to improve tolerance and functional strength for ADLs/IADLs  [x]? ? Balance retraining/tolerance tasks for facilitation of postural control with dynamic challenges during ADLs   [x]? ? Neuromuscular re-education: facilitate righting/equilibrium reactions, midline orientation, scapular stability/mobility, normalize muscle tone, and facilitate active functional movement/attention  []? ? Delirium prevention/treatment   []? Positioning to improve functional independence and prevent skin breakdown   []? ?Other:        Time In: 10:31  Time Out: 10:54     Min Units   Therapeutic Ex [id number]     Therapeutic Activities 99104 7 4   ADL/Self Care [id number] 15 1   Orthotic Management [id number]     Neuro Re-Ed [id number]     Non-Billable Time     TOTAL TIMED TREATMENT 23 [address] [facility] LA/L 49285 No (0)